# Patient Record
Sex: FEMALE | Race: WHITE | Employment: OTHER | URBAN - METROPOLITAN AREA
[De-identification: names, ages, dates, MRNs, and addresses within clinical notes are randomized per-mention and may not be internally consistent; named-entity substitution may affect disease eponyms.]

---

## 2017-11-20 ENCOUNTER — HOSPITAL ENCOUNTER (OUTPATIENT)
Dept: GENERAL RADIOLOGY | Age: 82
Discharge: HOME | End: 2017-11-20
Payer: MEDICARE

## 2017-11-20 DIAGNOSIS — M25.562 LEFT KNEE PAIN: ICD-10-CM

## 2017-11-20 PROCEDURE — 73562 X-RAY EXAM OF KNEE 3: CPT

## 2018-07-31 ENCOUNTER — APPOINTMENT (OUTPATIENT)
Dept: CT IMAGING | Age: 83
DRG: 312 | End: 2018-07-31
Attending: EMERGENCY MEDICINE
Payer: MEDICARE

## 2018-07-31 ENCOUNTER — HOSPITAL ENCOUNTER (INPATIENT)
Age: 83
LOS: 3 days | Discharge: NURSING, REHAB OR LONG-TERM CARE | DRG: 312 | End: 2018-08-04
Attending: EMERGENCY MEDICINE | Admitting: INTERNAL MEDICINE
Payer: MEDICARE

## 2018-07-31 DIAGNOSIS — I10 HYPERTENSION, ACCELERATED: ICD-10-CM

## 2018-07-31 DIAGNOSIS — R55 SYNCOPE AND COLLAPSE: Primary | ICD-10-CM

## 2018-07-31 DIAGNOSIS — S00.83XA CONTUSION OF FOREHEAD, INITIAL ENCOUNTER: ICD-10-CM

## 2018-07-31 DIAGNOSIS — E83.42 HYPOMAGNESEMIA: ICD-10-CM

## 2018-07-31 LAB
ALBUMIN SERPL-MCNC: 3.5 G/DL (ref 3.4–5)
ALBUMIN/GLOB SERPL: 1.3 {RATIO} (ref 0.8–1.7)
ALP SERPL-CCNC: 64 U/L (ref 45–117)
ALT SERPL-CCNC: 18 U/L (ref 13–56)
ANION GAP SERPL CALC-SCNC: 8 MMOL/L (ref 3–18)
APPEARANCE UR: CLEAR
AST SERPL-CCNC: 27 U/L (ref 15–37)
BACTERIA URNS QL MICRO: NEGATIVE /HPF
BASOPHILS # BLD: 0 K/UL (ref 0–0.1)
BASOPHILS NFR BLD: 0 % (ref 0–2)
BILIRUB SERPL-MCNC: 0.6 MG/DL (ref 0.2–1)
BILIRUB UR QL: NEGATIVE
BUN SERPL-MCNC: 29 MG/DL (ref 7–18)
BUN/CREAT SERPL: 30 (ref 12–20)
CALCIUM SERPL-MCNC: 9.5 MG/DL (ref 8.5–10.1)
CHLORIDE SERPL-SCNC: 103 MMOL/L (ref 100–108)
CK MB CFR SERPL CALC: 1.4 % (ref 0–4)
CK MB SERPL-MCNC: 4.3 NG/ML (ref 5–25)
CK SERPL-CCNC: 305 U/L (ref 26–192)
CO2 SERPL-SCNC: 29 MMOL/L (ref 21–32)
COLOR UR: YELLOW
CREAT SERPL-MCNC: 0.98 MG/DL (ref 0.6–1.3)
DIFFERENTIAL METHOD BLD: ABNORMAL
EOSINOPHIL # BLD: 0.1 K/UL (ref 0–0.4)
EOSINOPHIL NFR BLD: 0 % (ref 0–5)
EPITH CASTS URNS QL MICRO: NORMAL /LPF (ref 0–5)
ERYTHROCYTE [DISTWIDTH] IN BLOOD BY AUTOMATED COUNT: 13.2 % (ref 11.6–14.5)
GLOBULIN SER CALC-MCNC: 2.7 G/DL (ref 2–4)
GLUCOSE SERPL-MCNC: 95 MG/DL (ref 74–99)
GLUCOSE UR STRIP.AUTO-MCNC: NEGATIVE MG/DL
HCT VFR BLD AUTO: 33.2 % (ref 35–45)
HGB BLD-MCNC: 11.2 G/DL (ref 12–16)
HGB UR QL STRIP: NEGATIVE
KETONES UR QL STRIP.AUTO: NEGATIVE MG/DL
LEUKOCYTE ESTERASE UR QL STRIP.AUTO: ABNORMAL
LYMPHOCYTES # BLD: 2 K/UL (ref 0.9–3.6)
LYMPHOCYTES NFR BLD: 18 % (ref 21–52)
MAGNESIUM SERPL-MCNC: 1 MG/DL (ref 1.6–2.6)
MCH RBC QN AUTO: 28.7 PG (ref 24–34)
MCHC RBC AUTO-ENTMCNC: 33.7 G/DL (ref 31–37)
MCV RBC AUTO: 85.1 FL (ref 74–97)
MONOCYTES # BLD: 1 K/UL (ref 0.05–1.2)
MONOCYTES NFR BLD: 9 % (ref 3–10)
NEUTS SEG # BLD: 8.1 K/UL (ref 1.8–8)
NEUTS SEG NFR BLD: 73 % (ref 40–73)
NITRITE UR QL STRIP.AUTO: NEGATIVE
PH UR STRIP: 6.5 [PH] (ref 5–8)
PLATELET # BLD AUTO: 331 K/UL (ref 135–420)
PMV BLD AUTO: 9.6 FL (ref 9.2–11.8)
POTASSIUM SERPL-SCNC: 3.1 MMOL/L (ref 3.5–5.5)
PROT SERPL-MCNC: 6.2 G/DL (ref 6.4–8.2)
PROT UR STRIP-MCNC: NEGATIVE MG/DL
RBC # BLD AUTO: 3.9 M/UL (ref 4.2–5.3)
RBC #/AREA URNS HPF: 0 /HPF (ref 0–5)
SODIUM SERPL-SCNC: 140 MMOL/L (ref 136–145)
SP GR UR REFRACTOMETRY: 1.01 (ref 1–1.03)
TROPONIN I SERPL-MCNC: 0.05 NG/ML (ref 0–0.04)
UROBILINOGEN UR QL STRIP.AUTO: 0.2 EU/DL (ref 0.2–1)
WBC # BLD AUTO: 11.2 K/UL (ref 4.6–13.2)
WBC URNS QL MICRO: NORMAL /HPF (ref 0–4)

## 2018-07-31 PROCEDURE — 74011250636 HC RX REV CODE- 250/636: Performed by: EMERGENCY MEDICINE

## 2018-07-31 PROCEDURE — 70450 CT HEAD/BRAIN W/O DYE: CPT

## 2018-07-31 PROCEDURE — 74011250637 HC RX REV CODE- 250/637: Performed by: EMERGENCY MEDICINE

## 2018-07-31 PROCEDURE — 74011000250 HC RX REV CODE- 250: Performed by: EMERGENCY MEDICINE

## 2018-07-31 PROCEDURE — 80053 COMPREHEN METABOLIC PANEL: CPT | Performed by: EMERGENCY MEDICINE

## 2018-07-31 PROCEDURE — 99218 HC RM OBSERVATION: CPT

## 2018-07-31 PROCEDURE — 93005 ELECTROCARDIOGRAM TRACING: CPT

## 2018-07-31 PROCEDURE — 74011250637 HC RX REV CODE- 250/637: Performed by: INTERNAL MEDICINE

## 2018-07-31 PROCEDURE — 72131 CT LUMBAR SPINE W/O DYE: CPT

## 2018-07-31 PROCEDURE — 74011250636 HC RX REV CODE- 250/636: Performed by: INTERNAL MEDICINE

## 2018-07-31 PROCEDURE — 81001 URINALYSIS AUTO W/SCOPE: CPT | Performed by: EMERGENCY MEDICINE

## 2018-07-31 PROCEDURE — 99285 EMERGENCY DEPT VISIT HI MDM: CPT

## 2018-07-31 PROCEDURE — 51701 INSERT BLADDER CATHETER: CPT

## 2018-07-31 PROCEDURE — 72125 CT NECK SPINE W/O DYE: CPT

## 2018-07-31 PROCEDURE — 83735 ASSAY OF MAGNESIUM: CPT | Performed by: EMERGENCY MEDICINE

## 2018-07-31 PROCEDURE — 82550 ASSAY OF CK (CPK): CPT | Performed by: EMERGENCY MEDICINE

## 2018-07-31 PROCEDURE — 85025 COMPLETE CBC W/AUTO DIFF WBC: CPT | Performed by: EMERGENCY MEDICINE

## 2018-07-31 PROCEDURE — 77030005563 HC CATH URETH INT MMGH -A

## 2018-07-31 RX ORDER — MAGNESIUM SULFATE HEPTAHYDRATE 40 MG/ML
2 INJECTION, SOLUTION INTRAVENOUS ONCE
Status: COMPLETED | OUTPATIENT
Start: 2018-07-31 | End: 2018-07-31

## 2018-07-31 RX ORDER — CLONIDINE HYDROCHLORIDE 0.1 MG/1
0.2 TABLET ORAL
Status: COMPLETED | OUTPATIENT
Start: 2018-07-31 | End: 2018-07-31

## 2018-07-31 RX ORDER — SODIUM CHLORIDE 9 MG/ML
100 INJECTION, SOLUTION INTRAVENOUS CONTINUOUS
Status: DISCONTINUED | OUTPATIENT
Start: 2018-07-31 | End: 2018-07-31

## 2018-07-31 RX ORDER — POTASSIUM CHLORIDE 1.5 G/1.77G
40 POWDER, FOR SOLUTION ORAL
Status: COMPLETED | OUTPATIENT
Start: 2018-07-31 | End: 2018-07-31

## 2018-07-31 RX ORDER — POTASSIUM CHLORIDE, DEXTROSE MONOHYDRATE AND SODIUM CHLORIDE 300; 5; 900 MG/100ML; G/100ML; MG/100ML
INJECTION, SOLUTION INTRAVENOUS CONTINUOUS
Status: DISCONTINUED | OUTPATIENT
Start: 2018-07-31 | End: 2018-08-01

## 2018-07-31 RX ORDER — BACITRACIN 500 UNIT/G
1 PACKET (EA) TOPICAL
Status: COMPLETED | OUTPATIENT
Start: 2018-07-31 | End: 2018-07-31

## 2018-07-31 RX ORDER — ACETAMINOPHEN 500 MG
1000 TABLET ORAL
Status: COMPLETED | OUTPATIENT
Start: 2018-07-31 | End: 2018-07-31

## 2018-07-31 RX ADMIN — BACITRACIN 1 PACKET: 500 OINTMENT TOPICAL at 20:52

## 2018-07-31 RX ADMIN — SODIUM CHLORIDE 500 ML: 900 INJECTION, SOLUTION INTRAVENOUS at 20:53

## 2018-07-31 RX ADMIN — POTASSIUM CHLORIDE 40 MEQ: 1.5 POWDER, FOR SOLUTION ORAL at 21:17

## 2018-07-31 RX ADMIN — MAGNESIUM SULFATE HEPTAHYDRATE 2 G: 40 INJECTION, SOLUTION INTRAVENOUS at 23:09

## 2018-07-31 RX ADMIN — SODIUM CHLORIDE 100 ML/HR: 900 INJECTION, SOLUTION INTRAVENOUS at 20:13

## 2018-07-31 RX ADMIN — MAGNESIUM SULFATE HEPTAHYDRATE 2 G: 40 INJECTION, SOLUTION INTRAVENOUS at 21:17

## 2018-07-31 RX ADMIN — CLONIDINE HYDROCHLORIDE 0.2 MG: 0.1 TABLET ORAL at 23:00

## 2018-07-31 RX ADMIN — ACETAMINOPHEN 1000 MG: 500 TABLET, FILM COATED ORAL at 20:52

## 2018-07-31 NOTE — IP AVS SNAPSHOT
[address] [address] 
[phone] Patient: Vidya Cervantes MRN: [id number] A check edilia indicates which time of day the medication should be taken. My Medications START taking these medications Instructions Each Dose to Equal  
 Morning Noon Evening Bedtime  
 docusate sodium 100 mg capsule Commonly known as:  Car Valdovinos Your last dose was: Your next dose is: Take 1 Cap by mouth two (2) times a day for 90 days. 100 mg CONTINUE taking these medications Instructions Each Dose to Equal  
 Morning Noon Evening Bedtime  
 acetaminophen 325 mg tablet Commonly known as:  TYLENOL Your last dose was: Your next dose is: Take 2 Tabs by mouth every six (6) hours as needed. 650 mg  
    
   
   
   
  
 albuterol-ipratropium 2.5 mg-0.5 mg/3 ml Nebu Commonly known as:  Bridget Short Your last dose was: Your next dose is:    
   
   
 3 mL by Nebulization route three (3) times daily as needed for Wheezing. 3 mL  
    
   
   
   
  
 aspirin 81 mg tablet Your last dose was: Your next dose is: Take 81 mg by mouth. 81 mg  
    
   
   
   
  
 atenolol 50 mg tablet Commonly known as:  TENORMIN Your last dose was: Your next dose is: Take 1 Tab by mouth every twelve (12) hours. 50 mg  
    
   
   
   
  
 cloNIDine HCl 0.2 mg tablet Commonly known as:  CATAPRES Your last dose was: Your next dose is: Take 1 Tab by mouth every eight (8) hours. 0.2 mg  
    
   
   
   
  
 hydrALAZINE 50 mg tablet Commonly known as:  APRESOLINE Your last dose was: Your next dose is: Take 1 Tab by mouth three (3) times daily. 50 mg  
    
   
   
   
  
 losartan-hydroCHLOROthiazide 100-25 mg per tablet Commonly known as:  HYZAAR Your last dose was: Your next dose is: Take 1 Tab by mouth daily. 1 Tab  
    
   
   
   
  
 pantoprazole 40 mg tablet Commonly known as:  PROTONIX Your last dose was: Your next dose is: Take 1 Tab by mouth Daily (before breakfast). 40 mg  
    
   
   
   
  
 predniSONE 10 mg tablet Commonly known as:  Juaquin Robersoning Your last dose was: Your next dose is: Take 0.5 Tabs by mouth daily. 5 mg  
    
   
   
   
  
 simvastatin 40 mg tablet Commonly known as:  ZOCOR Your last dose was: Your next dose is: Take  by mouth nightly. VITAMIN D2 50,000 unit capsule Generic drug:  ergocalciferol Your last dose was: Your next dose is: Take 50,000 Units by mouth. 51832 Units STOP taking these medications   
 omeprazole 20 mg capsule Commonly known as:  PRILOSEC QUEtiapine 25 mg tablet Commonly known as:  SEROquel Where to Get Your Medications Information on where to get these meds will be given to you by the nurse or doctor. ! Ask your nurse or doctor about these medications  
  docusate sodium 100 mg capsule

## 2018-07-31 NOTE — ED TRIAGE NOTES
Pt brought in by EMS for c/o syncope and fall with head injury. Per EMS pt fell while tending to her garden at about noon this day, and has been lying in her garden ever since. Pt reports positive LOC and hitting her head. No c-spine or backboard precautions taken by EMS. EMS interventions include IV in left A/C and 500mL saline bolus administration.

## 2018-07-31 NOTE — IP AVS SNAPSHOT
[address] [address] 
[phone] Patient: Sam Tomas MRN: [id number] About your hospitalization You were admitted on:  2018 You last received care in the:  [address] [place] [address] You were discharged on:  2018 Why you were hospitalized Your primary diagnosis was:  Not on File Your diagnoses also included:  Syncope And Collapse, Hypomagnesemia, Syncope, Hypokalemia, Htn (Hypertension), Knee Pain, Left, Weakness Follow-up Information Follow up With Details Comments Contact Info Gianluca Alvarez MD   [address] [facility] Located within [facility] [id number] [phone] Discharge Orders None A check edilia indicates which time of day the medication should be taken. My Medications START taking these medications Instructions Each Dose to Equal  
 Morning Noon Evening Bedtime  
 docusate sodium 100 mg capsule Commonly known as:  Micki Eisenmenger Your last dose was: Your next dose is: Take 1 Cap by mouth two (2) times a day for 90 days. 100 mg CONTINUE taking these medications Instructions Each Dose to Equal  
 Morning Noon Evening Bedtime  
 acetaminophen 325 mg tablet Commonly known as:  TYLENOL Your last dose was: Your next dose is: Take 2 Tabs by mouth every six (6) hours as needed. 650 mg  
    
   
   
   
  
 albuterol-ipratropium 2.5 mg-0.5 mg/3 ml Nebu Commonly known as:  Carlton Embs Your last dose was: Your next dose is:    
   
   
 3 mL by Nebulization route three (3) times daily as needed for Wheezing. 3 mL  
    
   
   
   
  
 aspirin 81 mg tablet Your last dose was: Your next dose is: Take 81 mg by mouth. 81 mg  
    
   
   
   
  
 atenolol 50 mg tablet Commonly known as:  TENORMIN Your last dose was: Your next dose is: Take 1 Tab by mouth every twelve (12) hours. 50 mg  
    
   
   
   
  
 cloNIDine HCl 0.2 mg tablet Commonly known as:  CATAPRES Your last dose was: Your next dose is: Take 1 Tab by mouth every eight (8) hours. 0.2 mg  
    
   
   
   
  
 hydrALAZINE 50 mg tablet Commonly known as:  APRESOLINE Your last dose was: Your next dose is: Take 1 Tab by mouth three (3) times daily. 50 mg  
    
   
   
   
  
 losartan-hydroCHLOROthiazide 100-25 mg per tablet Commonly known as:  HYZAAR Your last dose was: Your next dose is: Take 1 Tab by mouth daily. 1 Tab  
    
   
   
   
  
 pantoprazole 40 mg tablet Commonly known as:  PROTONIX Your last dose was: Your next dose is: Take 1 Tab by mouth Daily (before breakfast). 40 mg  
    
   
   
   
  
 predniSONE 10 mg tablet Commonly known as:  Orlean Aas Your last dose was: Your next dose is: Take 0.5 Tabs by mouth daily. 5 mg  
    
   
   
   
  
 simvastatin 40 mg tablet Commonly known as:  ZOCOR Your last dose was: Your next dose is: Take  by mouth nightly. VITAMIN D2 50,000 unit capsule Generic drug:  ergocalciferol Your last dose was: Your next dose is: Take 50,000 Units by mouth. 31132 Units STOP taking these medications   
 omeprazole 20 mg capsule Commonly known as:  PRILOSEC QUEtiapine 25 mg tablet Commonly known as:  SEROquel Where to Get Your Medications Information on where to get these meds will be given to you by the nurse or doctor. ! Ask your nurse or doctor about these medications  
  docusate sodium 100 mg capsule Discharge Instructions Fainting: Care Instructions Your Care Instructions When you faint, or pass out, you lose consciousness for a short time. A brief drop in blood flow to the brain often causes it. When you fall or lie down, more blood flows to your brain and you regain consciousness. Emotional stress, pain, or overheating-especially if you have been standing-can make you faint. In these cases, fainting is usually not serious. But fainting can be a sign of a more serious problem. Your doctor may want you to have more tests to rule out other causes. The treatment you need depends on the reason why you fainted. The doctor has checked you carefully, but problems can develop later. If you notice any problems or new symptoms, get medical treatment right away. Follow-up care is a key part of your treatment and safety. Be sure to make and go to all appointments, and call your doctor if you are having problems. It's also a good idea to know your test results and keep a list of the medicines you take. How can you care for yourself at home? · Drink plenty of fluids to prevent dehydration. If you have kidney, heart, or liver disease and have to limit fluids, talk with your doctor before you increase your fluid intake. When should you call for help? Call 911 anytime you think you may need emergency care. For example, call if: 
  · You have symptoms of a heart problem. These may include: ¨ Chest pain or pressure. ¨ Severe trouble breathing. ¨ A fast or irregular heartbeat. ¨ Lightheadedness or sudden weakness. ¨ Coughing up pink, foamy mucus. ¨ Passing out. After you call 911, the  may tell you to chew 1 adult-strength or 2 to 4 low-dose aspirin. Wait for an ambulance. Do not try to drive yourself.  
  · You have symptoms of a stroke. These may include: 
¨ Sudden numbness, tingling, weakness, or loss of movement in your face, arm, or leg, especially on only one side of your body. ¨ Sudden vision changes. ¨ Sudden trouble speaking. ¨ Sudden confusion or trouble understanding simple statements. ¨ Sudden problems with walking or balance. ¨ A sudden, severe headache that is different from past headaches.  
  · You passed out (lost consciousness) again.  
 Watch closely for changes in your health, and be sure to contact your doctor if: 
  · You do not get better as expected. Where can you learn more? Go to http://abena-tameka.info/. Enter Z639 in the search box to learn more about \"Fainting: Care Instructions. \" Current as of: November 20, 2017 Content Version: 11.7 © 6591-7135 [employer]. Care instructions adapted under license by [employer] (which disclaims liability or warranty for this information). If you have questions about a medical condition or this instruction, always ask your healthcare professional. [address] any warranty or liability for your use of this information. High Blood Pressure: Care Instructions Your Care Instructions If your blood pressure is usually above 130/80, you have high blood pressure, or hypertension. That means the top number is 130 or higher or the bottom number is 80 or higher, or both. Despite what a lot of people think, high blood pressure usually doesn't cause headaches or make you feel dizzy or lightheaded. It usually has no symptoms. But it does increase your risk for heart attack, stroke, and kidney or eye damage. The higher your blood pressure, the more your risk increases. Your doctor will give you a goal for your blood pressure. Your goal will be based on your health and your age. Lifestyle changes, such as eating healthy and being active, are always important to help lower blood pressure. You might also take medicine to reach your blood pressure goal. 
Follow-up care is a key part of your treatment and safety.  Be sure to make and go to all appointments, and call your doctor if you are having problems. It's also a good idea to know your test results and keep a list of the medicines you take. How can you care for yourself at home? Medical treatment · If you stop taking your medicine, your blood pressure will go back up. You may take one or more types of medicine to lower your blood pressure. Be safe with medicines. Take your medicine exactly as prescribed. Call your doctor if you think you are having a problem with your medicine. · Talk to your doctor before you start taking aspirin every day. Aspirin can help certain people lower their risk of a heart attack or stroke. But taking aspirin isn't right for everyone, because it can cause serious bleeding. · See your doctor regularly. You may need to see the doctor more often at first or until your blood pressure comes down. · If you are taking blood pressure medicine, talk to your doctor before you take decongestants or anti-inflammatory medicine, such as ibuprofen. Some of these medicines can raise blood pressure. · Learn how to check your blood pressure at home. Lifestyle changes · Stay at a healthy weight. This is especially important if you put on weight around the waist. Losing even 10 pounds can help you lower your blood pressure. · If your doctor recommends it, get more exercise. Walking is a good choice. Bit by bit, increase the amount you walk every day. Try for at least 30 minutes on most days of the week. You also may want to swim, bike, or do other activities. · Avoid or limit alcohol. Talk to your doctor about whether you can drink any alcohol. · Try to limit how much sodium you eat to less than 2,300 milligrams (mg) a day. Your doctor may ask you to try to eat less than 1,500 mg a day. · Eat plenty of fruits (such as bananas and oranges), vegetables, legumes, whole grains, and low-fat dairy products. · Lower the amount of saturated fat in your diet. Saturated fat is found in animal products such as milk, cheese, and meat. Limiting these foods may help you lose weight and also lower your risk for heart disease. · Do not smoke. Smoking increases your risk for heart attack and stroke. If you need help quitting, talk to your doctor about stop-smoking programs and medicines. These can increase your chances of quitting for good. When should you call for help? Call 911 anytime you think you may need emergency care. This may mean having symptoms that suggest that your blood pressure is causing a serious heart or blood vessel problem. Your blood pressure may be over 180/110. 
 For example, call 911 if: 
  · You have symptoms of a heart attack. These may include: ¨ Chest pain or pressure, or a strange feeling in the chest. 
¨ Sweating. ¨ Shortness of breath. ¨ Nausea or vomiting. ¨ Pain, pressure, or a strange feeling in the back, neck, jaw, or upper belly or in one or both shoulders or arms. ¨ Lightheadedness or sudden weakness. ¨ A fast or irregular heartbeat.  
  · You have symptoms of a stroke. These may include: 
¨ Sudden numbness, tingling, weakness, or loss of movement in your face, arm, or leg, especially on only one side of your body. ¨ Sudden vision changes. ¨ Sudden trouble speaking. ¨ Sudden confusion or trouble understanding simple statements. ¨ Sudden problems with walking or balance. ¨ A sudden, severe headache that is different from past headaches.  
  · You have severe back or belly pain.  
 Do not wait until your blood pressure comes down on its own. Get help right away. 
 Call your doctor now or seek immediate care if: 
  · Your blood pressure is much higher than normal (such as 180/110 or higher), but you don't have symptoms.  
  · You think high blood pressure is causing symptoms, such as: ¨ Severe headache. ¨ Blurry vision.  Watch closely for changes in your health, and be sure to contact your doctor if: 
  · Your blood pressure measures 140/90 or higher at least 2 times. That means the top number is 140 or higher or the bottom number is 90 or higher, or both.  
  · You think you may be having side effects from your blood pressure medicine.  
  · Your blood pressure is usually normal, but it goes above normal at least 2 times. Where can you learn more? Go to http://abena-tameka.info/. Enter K403 in the search box to learn more about \"High Blood Pressure: Care Instructions. \" Current as of: December 6, 2017 Content Version: 11.7 © 0706-6255 [employer]. Care instructions adapted under license by [employer] (which disclaims liability or warranty for this information). If you have questions about a medical condition or this instruction, always ask your healthcare professional. [address] [address] any warranty or liability for your use of this information. DISCHARGE SUMMARY from Nurse PATIENT INSTRUCTIONS: 
 
After general anesthesia or intravenous sedation, for 24 hours or while taking prescription Narcotics: · Limit your activities · Do not drive and operate hazardous machinery · Do not make important personal or business decisions · Do  not drink alcoholic beverages · If you have not urinated within 8 hours after discharge, please contact your surgeon on call. Report the following to your surgeon: 
· Excessive pain, swelling, redness or odor of or around the surgical area · Temperature over 100.5 · Nausea and vomiting lasting longer than 4 hours or if unable to take medications · Any signs of decreased circulation or nerve impairment to extremity: change in color, persistent  numbness, tingling, coldness or increase pain · Any questions What to do at Home: 
Recommended activity: Activity as tolerated, or as physician advised If you experience any of the following symptoms of a Stroke, Warning Signs of a Heart Attack  And When to Call for Help as listed under discharge teachings , please follow up with your primary care physician or call 911. *  Please give a list of your current medications to your Primary Care Provider. *  Please update this list whenever your medications are discontinued, doses are 
    changed, or new medications (including over-the-counter products) are added. *  Please carry medication information at all times in case of emergency situations. These are general instructions for a healthy lifestyle: No smoking/ No tobacco products/ Avoid exposure to second hand smoke Surgeon General's Warning:  Quitting smoking now greatly reduces serious risk to your health. Obesity, smoking, and sedentary lifestyle greatly increases your risk for illness A healthy diet, regular physical exercise & weight monitoring are important for maintaining a healthy lifestyle You may be retaining fluid if you have a history of heart failure or if you experience any of the following symptoms:  Weight gain of 3 pounds or more overnight or 5 pounds in a week, increased swelling in our hands or feet or shortness of breath while lying flat in bed. Please call your doctor as soon as you notice any of these symptoms; do not wait until your next office visit. Recognize signs and symptoms of STROKE: 
 
F-face looks uneven A-arms unable to move or move unevenly S-speech slurred or non-existent T-time-call 911 as soon as signs and symptoms begin-DO NOT go Back to bed or wait to see if you get better-TIME IS BRAIN. Warning Signs of HEART ATTACK Call 911 if you have these symptoms: 
? Chest discomfort. Most heart attacks involve discomfort in the center of the chest that lasts more than a few minutes, or that goes away and comes back. It can feel like uncomfortable pressure, squeezing, fullness, or pain. ? Discomfort in other areas of the upper body. Symptoms can include pain or discomfort in one or both arms, the back, neck, jaw, or stomach. ? Shortness of breath with or without chest discomfort. ? Other signs may include breaking out in a cold sweat, nausea, or lightheadedness. Don't wait more than five minutes to call [address]! Fast action can save your life. Calling 911 is almost always the fastest way to get lifesaving treatment. Emergency Medical Services staff can begin treatment when they arrive  up to an hour sooner than if someone gets to the hospital by car. The discharge information has been reviewed with the patient. The patient verbalized understanding. Discharge medications reviewed with the patient and appropriate educational materials and side effects teaching were provided. ___________________________________________________________________________________________________________________________________ [employer] Announcement We are excited to announce that we are making your provider's discharge notes available to you in [employer]. You will see these notes when they are completed and signed by the physician that discharged you from your recent hospital stay. If you have any questions or concerns about any information you see in [employer], please call the Health Information Department where you were seen or reach out to your Primary Care Provider for more information about your plan of care. Introducing [facility]! Nellie Dewitt introduces [employer] patient portal. Now you can access parts of your medical record, email your doctor's office, and request medication refills online. 1. In your internet browser, go to [email or web address] 2. Click on the First Time User? Click Here link in the Sign In box. You will see the New Member Sign Up page. 3. Enter your [employer] Access Code exactly as it appears below.  You will not need to use this code after youve completed the sign-up process. If you do not sign up before the expiration date, you must request a new code. · [employer] Access Code: [email or web address] Expires: 10/29/2018  7:53 PM 
 
4. Enter the last four digits of your Social Security Number (xxxx) and Date of Birth (mm/dd/yyyy) as indicated and click Submit. You will be taken to the next sign-up page. 5. Create a [employer] ID. This will be your [employer] login ID and cannot be changed, so think of one that is secure and easy to remember. 6. Create a [employer] password. You can change your password at any time. 7. Enter your Password Reset Question and Answer. This can be used at a later time if you forget your password. 8. Enter your e-mail address. You will receive e-mail notification when new information is available in [address] 9. Click Sign Up. You can now view and download portions of your medical record. 10. Click the Download Summary menu link to download a portable copy of your medical information. If you have questions, please visit the Frequently Asked Questions section of the [employer] website. Remember, [employer] is NOT to be used for urgent needs. For medical emergencies, dial 911. Now available from your iPhone and Android! Introducing Neri Tapia As a [facility] patient, I wanted to make you aware of our electronic visit tool called Neri Reaganshelly. [facility] 24/7 allows you to connect within minutes with a medical provider 24 hours a day, seven days a week via a mobile device or tablet or logging into a secure website from your computer. You can access Neri Tapia from anywhere in the [place].  
 
A virtual visit might be right for you when you have a simple condition and feel like you just dont want to get out of bed, or cant get away from work for an appointment, when your regular [facility] provider is not available (evenings, weekends or holidays), or when youre out of town and need minor care. Electronic visits cost only $49 and if the [employer] 24/7 provider determines a prescription is needed to treat your condition, one can be electronically transmitted to a nearby pharmacy*. Please take a moment to enroll today if you have not already done so. The enrollment process is free and takes just a few minutes. To enroll, please download the [employer] 24/7 rick to your tablet or phone, or visit [email or web address] to enroll on your computer. And, as an [address] [place] [address] patient with a [employer] account, the results of your visits will be scanned into your electronic medical record and your primary care provider will be able to view the scanned results. We urge you to continue to see your regular [employer] provider for your ongoing medical care. And while your primary care provider may not be the one available when you seek a [employer] virtual visit, the peace of mind you get from getting a real diagnosis real time can be priceless. For more information on [employer], view our Frequently Asked Questions (FAQs) at [email or web address]. Sincerely, 
 
Mike Barraza MD 
Chief Medical Officer [address] [place] [address] *:  certain medications cannot be prescribed via [employer] Providers Seen During Your Hospitalization Provider Specialty Primary office phone Minor Luna MD Emergency Medicine [phone] Yash Hanson MD Geriatric Medicine [phone] Your Primary Care Physician (PCP) Primary Care Physician Office Phone Office Fax Mabel Blanco [phone] You are allergic to the following No active allergies Recent Documentation Height Weight Breastfeeding? BMI OB Status Smoking Status 1.651 m 57.2 kg No 20.97 kg/m2 Postmenopausal Former Smoker Emergency Contacts Name Discharge Info Relation Home Work Mobile YoungmbAdama N/A  AT THIS TIME [6] Son [22] [phone] Patient Belongings The following personal items are in your possession at time of discharge: 
  Dental Appliances: None  Visual Aid: None      Home Medications: None   Jewelry: Watch, With patient  Clothing: Shirt, Undergarments, Pants, Socks, With patient    Other Valuables: Arnaud Machado, With patient, Money (comment) (pt declines assessment of personal belongings and safe) Please provide this summary of care documentation to your next provider. Signatures-by signing, you are acknowledging that this After Visit Summary has been reviewed with you and you have received a copy. Patient Signature:  ____________________________________________________________ Date:  ____________________________________________________________  
  
[facility] Provider Signature:  ____________________________________________________________ Date:  ____________________________________________________________

## 2018-08-01 ENCOUNTER — APPOINTMENT (OUTPATIENT)
Dept: GENERAL RADIOLOGY | Age: 83
DRG: 312 | End: 2018-08-01
Attending: INTERNAL MEDICINE
Payer: MEDICARE

## 2018-08-01 PROBLEM — R55 SYNCOPE AND COLLAPSE: Status: RESOLVED | Noted: 2018-07-31 | Resolved: 2018-08-01

## 2018-08-01 PROBLEM — R53.1 WEAKNESS: Status: ACTIVE | Noted: 2018-08-01

## 2018-08-01 PROBLEM — R55 SYNCOPE: Status: ACTIVE | Noted: 2018-08-01

## 2018-08-01 PROBLEM — E87.6 HYPOKALEMIA: Status: ACTIVE | Noted: 2018-08-01

## 2018-08-01 PROBLEM — M25.562 KNEE PAIN, LEFT: Status: ACTIVE | Noted: 2018-08-01

## 2018-08-01 PROBLEM — I10 HTN (HYPERTENSION): Status: ACTIVE | Noted: 2018-08-01

## 2018-08-01 LAB
ANION GAP SERPL CALC-SCNC: 7 MMOL/L (ref 3–18)
ATRIAL RATE: 78 BPM
BUN SERPL-MCNC: 24 MG/DL (ref 7–18)
BUN/CREAT SERPL: 32 (ref 12–20)
CALCIUM SERPL-MCNC: 8.8 MG/DL (ref 8.5–10.1)
CALCULATED P AXIS, ECG09: 65 DEGREES
CALCULATED R AXIS, ECG10: 30 DEGREES
CALCULATED T AXIS, ECG11: 60 DEGREES
CHLORIDE SERPL-SCNC: 107 MMOL/L (ref 100–108)
CO2 SERPL-SCNC: 28 MMOL/L (ref 21–32)
CREAT SERPL-MCNC: 0.75 MG/DL (ref 0.6–1.3)
CRP SERPL-MCNC: 4 MG/DL (ref 0–0.3)
DIAGNOSIS, 93000: NORMAL
ERYTHROCYTE [SEDIMENTATION RATE] IN BLOOD: 40 MM/HR (ref 0–30)
FOLATE SERPL-MCNC: 17.6 NG/ML (ref 3.1–17.5)
GLUCOSE SERPL-MCNC: 94 MG/DL (ref 74–99)
MAGNESIUM SERPL-MCNC: 2.1 MG/DL (ref 1.6–2.6)
P-R INTERVAL, ECG05: 222 MS
POTASSIUM SERPL-SCNC: 4.1 MMOL/L (ref 3.5–5.5)
Q-T INTERVAL, ECG07: 376 MS
QRS DURATION, ECG06: 84 MS
QTC CALCULATION (BEZET), ECG08: 428 MS
SODIUM SERPL-SCNC: 142 MMOL/L (ref 136–145)
TSH SERPL DL<=0.05 MIU/L-ACNC: 1.8 UIU/ML (ref 0.36–3.74)
VENTRICULAR RATE, ECG03: 78 BPM
VIT B12 SERPL-MCNC: 349 PG/ML (ref 211–911)

## 2018-08-01 PROCEDURE — 85652 RBC SED RATE AUTOMATED: CPT | Performed by: INTERNAL MEDICINE

## 2018-08-01 PROCEDURE — 73564 X-RAY EXAM KNEE 4 OR MORE: CPT

## 2018-08-01 PROCEDURE — 82607 VITAMIN B-12: CPT | Performed by: INTERNAL MEDICINE

## 2018-08-01 PROCEDURE — 97162 PT EVAL MOD COMPLEX 30 MIN: CPT

## 2018-08-01 PROCEDURE — 99218 HC RM OBSERVATION: CPT

## 2018-08-01 PROCEDURE — 74011250636 HC RX REV CODE- 250/636: Performed by: INTERNAL MEDICINE

## 2018-08-01 PROCEDURE — 77030037878 HC DRSG MEPILEX >48IN BORD MOLN -B

## 2018-08-01 PROCEDURE — 77030038269 HC DRN EXT URIN PURWCK BARD -A

## 2018-08-01 PROCEDURE — 86140 C-REACTIVE PROTEIN: CPT | Performed by: INTERNAL MEDICINE

## 2018-08-01 PROCEDURE — 77030011256 HC DRSG MEPILEX <16IN NO BORD MOLN -A

## 2018-08-01 PROCEDURE — 36415 COLL VENOUS BLD VENIPUNCTURE: CPT | Performed by: INTERNAL MEDICINE

## 2018-08-01 PROCEDURE — 71045 X-RAY EXAM CHEST 1 VIEW: CPT

## 2018-08-01 PROCEDURE — 80048 BASIC METABOLIC PNL TOTAL CA: CPT | Performed by: INTERNAL MEDICINE

## 2018-08-01 PROCEDURE — 74011250637 HC RX REV CODE- 250/637: Performed by: INTERNAL MEDICINE

## 2018-08-01 PROCEDURE — 65660000000 HC RM CCU STEPDOWN

## 2018-08-01 PROCEDURE — 83735 ASSAY OF MAGNESIUM: CPT | Performed by: INTERNAL MEDICINE

## 2018-08-01 PROCEDURE — 97530 THERAPEUTIC ACTIVITIES: CPT

## 2018-08-01 PROCEDURE — 84443 ASSAY THYROID STIM HORMONE: CPT | Performed by: INTERNAL MEDICINE

## 2018-08-01 RX ORDER — SIMVASTATIN 40 MG/1
40 TABLET, FILM COATED ORAL
Status: DISCONTINUED | OUTPATIENT
Start: 2018-08-01 | End: 2018-08-04 | Stop reason: HOSPADM

## 2018-08-01 RX ORDER — PREDNISONE 5 MG/1
5 TABLET ORAL DAILY
Status: DISCONTINUED | OUTPATIENT
Start: 2018-08-01 | End: 2018-08-01

## 2018-08-01 RX ORDER — IPRATROPIUM BROMIDE AND ALBUTEROL SULFATE 2.5; .5 MG/3ML; MG/3ML
3 SOLUTION RESPIRATORY (INHALATION)
Status: DISCONTINUED | OUTPATIENT
Start: 2018-08-01 | End: 2018-08-04 | Stop reason: HOSPADM

## 2018-08-01 RX ORDER — PANTOPRAZOLE SODIUM 40 MG/1
40 TABLET, DELAYED RELEASE ORAL
Status: DISCONTINUED | OUTPATIENT
Start: 2018-08-01 | End: 2018-08-04 | Stop reason: HOSPADM

## 2018-08-01 RX ORDER — ACETAMINOPHEN 325 MG/1
650 TABLET ORAL
Status: DISCONTINUED | OUTPATIENT
Start: 2018-08-01 | End: 2018-08-04 | Stop reason: HOSPADM

## 2018-08-01 RX ORDER — ATENOLOL 50 MG/1
50 TABLET ORAL EVERY 12 HOURS
Status: DISCONTINUED | OUTPATIENT
Start: 2018-08-01 | End: 2018-08-04 | Stop reason: HOSPADM

## 2018-08-01 RX ORDER — LOSARTAN POTASSIUM 50 MG/1
100 TABLET ORAL DAILY
Status: DISCONTINUED | OUTPATIENT
Start: 2018-08-01 | End: 2018-08-04 | Stop reason: HOSPADM

## 2018-08-01 RX ORDER — ERGOCALCIFEROL 1.25 MG/1
50000 CAPSULE ORAL
Status: DISCONTINUED | OUTPATIENT
Start: 2018-08-01 | End: 2018-08-04 | Stop reason: HOSPADM

## 2018-08-01 RX ORDER — HYDRALAZINE HYDROCHLORIDE 50 MG/1
50 TABLET, FILM COATED ORAL 3 TIMES DAILY
Status: DISCONTINUED | OUTPATIENT
Start: 2018-08-01 | End: 2018-08-04 | Stop reason: HOSPADM

## 2018-08-01 RX ORDER — HYDROCHLOROTHIAZIDE 25 MG/1
25 TABLET ORAL DAILY
Status: DISCONTINUED | OUTPATIENT
Start: 2018-08-01 | End: 2018-08-04 | Stop reason: HOSPADM

## 2018-08-01 RX ORDER — ASPIRIN 81 MG/1
81 TABLET ORAL DAILY
Status: DISCONTINUED | OUTPATIENT
Start: 2018-08-01 | End: 2018-08-04 | Stop reason: HOSPADM

## 2018-08-01 RX ORDER — CLONIDINE HYDROCHLORIDE 0.1 MG/1
0.2 TABLET ORAL EVERY 8 HOURS
Status: DISCONTINUED | OUTPATIENT
Start: 2018-08-01 | End: 2018-08-04 | Stop reason: HOSPADM

## 2018-08-01 RX ADMIN — CLONIDINE HYDROCHLORIDE 0.2 MG: 0.1 TABLET ORAL at 09:45

## 2018-08-01 RX ADMIN — PANTOPRAZOLE SODIUM 40 MG: 40 TABLET, DELAYED RELEASE ORAL at 09:46

## 2018-08-01 RX ADMIN — SIMVASTATIN 40 MG: 40 TABLET, FILM COATED ORAL at 21:35

## 2018-08-01 RX ADMIN — HYDRALAZINE HYDROCHLORIDE 50 MG: 50 TABLET, FILM COATED ORAL at 21:35

## 2018-08-01 RX ADMIN — METHYLPREDNISOLONE SODIUM SUCCINATE 40 MG: 40 INJECTION, POWDER, FOR SOLUTION INTRAMUSCULAR; INTRAVENOUS at 13:52

## 2018-08-01 RX ADMIN — ASPIRIN 81 MG: 81 TABLET, COATED ORAL at 09:46

## 2018-08-01 RX ADMIN — METHYLPREDNISOLONE SODIUM SUCCINATE 40 MG: 40 INJECTION, POWDER, FOR SOLUTION INTRAMUSCULAR; INTRAVENOUS at 21:36

## 2018-08-01 RX ADMIN — DEXTROSE MONOHYDRATE, SODIUM CHLORIDE, AND POTASSIUM CHLORIDE 1000 ML: 50; 9; 2.98 INJECTION, SOLUTION INTRAVENOUS at 00:27

## 2018-08-01 RX ADMIN — HYDROCHLOROTHIAZIDE 25 MG: 25 TABLET ORAL at 09:46

## 2018-08-01 RX ADMIN — ATENOLOL 50 MG: 50 TABLET ORAL at 21:35

## 2018-08-01 RX ADMIN — HYDRALAZINE HYDROCHLORIDE 50 MG: 50 TABLET, FILM COATED ORAL at 17:14

## 2018-08-01 RX ADMIN — ATENOLOL 50 MG: 50 TABLET ORAL at 09:45

## 2018-08-01 RX ADMIN — ERGOCALCIFEROL 50000 UNITS: 1.25 CAPSULE ORAL at 09:45

## 2018-08-01 RX ADMIN — HYDRALAZINE HYDROCHLORIDE 50 MG: 50 TABLET, FILM COATED ORAL at 09:46

## 2018-08-01 RX ADMIN — LOSARTAN POTASSIUM 100 MG: 50 TABLET ORAL at 09:46

## 2018-08-01 RX ADMIN — METHYLPREDNISOLONE SODIUM SUCCINATE 40 MG: 40 INJECTION, POWDER, FOR SOLUTION INTRAMUSCULAR; INTRAVENOUS at 09:45

## 2018-08-01 RX ADMIN — CLONIDINE HYDROCHLORIDE 0.2 MG: 0.1 TABLET ORAL at 17:14

## 2018-08-01 NOTE — PROGRESS NOTES
Problem: Pressure Injury - Risk of 
Goal: *Prevention of pressure injury Document Josiah Scale and appropriate interventions in the flowsheet. Outcome: Progressing Towards Goal 
Pressure Injury Interventions: 
  
 
Moisture Interventions: Internal/External urinary devices Activity Interventions: Pressure redistribution bed/mattress(bed type) Mobility Interventions: HOB 30 degrees or less Nutrition Interventions: Document food/fluid/supplement intake Problem: Falls - Risk of 
Goal: *Absence of Falls Document Kieran Low Fall Risk and appropriate interventions in the flowsheet. Outcome: Progressing Towards Goal 
Fall Risk Interventions: 
Mobility Interventions: Patient to call before getting OOB Medication Interventions: Patient to call before getting OOB, Teach patient to arise slowly, Bed/chair exit alarm Elimination Interventions: Call light in reach History of Falls Interventions: Investigate reason for fall

## 2018-08-01 NOTE — ED PROVIDER NOTES
HPI Comments: Dwayne Orozco is a 80 y.o. Female who states she slip and fell in yard this morning, passing out twice. Was found approx 7 hours later by caregiver in yard, struggling to get up. No nvd, fcs. No sig pain now except for low back where she was lying down and neck, head. No nvd, cp, cough, abd pain. Was unclear to why she fell. The history is provided by the patient, the EMS personnel and medical records. Past Medical History:  
Diagnosis Date  Arthritis  CAD (coronary artery disease) 2001  
 3 coronary stents  Cardiac complication  Hypertension  Migraine 3/1/2015  Polymyalgia rheumatica ([facility]) 2/28/2015 Past Surgical History:  
Procedure Laterality Date  CARDIAC SURG PROCEDURE UNLIST    
 3 stents Family History:  
Problem Relation Age of Onset  Heart Disease Mother  Heart Disease Father Social History Social History  Marital status:  Spouse name: N/A  
 Number of children: N/A  
 Years of education: N/A Occupational History  Not on file. Social History Main Topics  Smoking status: Former Smoker Quit date: 1/15/1980  Smokeless tobacco: Not on file  Alcohol use No  
 Drug use: Not on file  Sexual activity: Not on file Other Topics Concern  Not on file Social History Narrative ALLERGIES: Review of patient's allergies indicates no known allergies. Review of Systems Constitutional: Negative for fever. HENT: Negative for sore throat. Eyes: Negative for visual disturbance. Respiratory: Negative for cough and shortness of breath. Cardiovascular: Negative for chest pain. Gastrointestinal: Negative for abdominal pain. Endocrine: Negative for polyuria. Genitourinary: Negative for difficulty urinating. Musculoskeletal: Positive for gait problem. Skin: Positive for wound (left elbow, bruise to forehead, lower back).   
 
 
Vitals:  
 07/31/18 1955 07/31/18 2210  
BP: 179/77 (!) 250/91 Pulse: 87 73 Resp: 16 18 Temp: 98.4 °F (36.9 °C) SpO2: 100% 99% Weight: 54.4 kg (120 lb) Height: 5' 5\" (1.651 m) Physical Exam  
Constitutional: She is oriented to person, place, and time. She appears well-developed and well-nourished. No distress. HENT:  
Head: Head is with contusion. Head is without raccoon's eyes, without Mcginnis's sign, without abrasion and without laceration. Right Ear: External ear normal. No mastoid tenderness. No hemotympanum. Left Ear: External ear normal. No mastoid tenderness. No hemotympanum. Nose: Nose normal.  
Mouth/Throat: Uvula is midline, oropharynx is clear and moist and mucous membranes are normal.  
Eyes: Conjunctivae and EOM are normal. Pupils are equal, round, and reactive to light. No scleral icterus. Neck: Neck supple. Spinous process tenderness and muscular tenderness present. No rigidity. No edema, no erythema and normal range of motion present. Cardiovascular: Normal rate, regular rhythm, normal heart sounds and intact distal pulses. Pulmonary/Chest: Effort normal and breath sounds normal.  
Abdominal: Soft. There is no tenderness. Musculoskeletal: She exhibits no edema. There is an abrasion, contusion to lumbar spine with min ttp. No sig swelling Left elbow with nl rom. No pain on exam. Abrasion No pelvic ttp or sig lower joint ttp Neurological: She is alert and oriented to person, place, and time. No cranial nerve deficit (3-12). Gait normal.  
Skin: Skin is warm and dry. She is not diaphoretic. Psychiatric: Her behavior is normal.  
Nursing note and vitals reviewed. [facility] 
 
 
ED Course Procedures Vitals: 
Patient Vitals for the past 12 hrs: 
 Temp Pulse Resp BP SpO2  
07/31/18 2210 - 73 18 (!) 250/91 99 % 07/31/18 1955 98.4 °F (36.9 °C) 87 16 179/77 100 % Medications ordered:  
Medications  
0.9% sodium chloride infusion (100 mL/hr IntraVENous New Bag 7/31/18 2013)  
magnesium sulfate 2 g/50 ml IVPB (premix or compounded) (not administered)  
cloNIDine HCl (CATAPRES) tablet 0.2 mg (not administered)  
sodium chloride 0.9 % bolus infusion 500 mL (0 mL IntraVENous IV Completed 7/31/18 2123)  
bacitracin 500 unit/gram packet 1 Packet (1 Packet Topical Given 7/31/18 2052)  
acetaminophen (TYLENOL) tablet 1,000 mg (1,000 mg Oral Given 7/31/18 2052)  
magnesium sulfate 2 g/50 ml IVPB (premix or compounded) (0 g IntraVENous IV Completed 7/31/18 2157) potassium chloride (KLOR-CON) packet 40 mEq (40 mEq Oral Given 7/31/18 2117) Lab findings: 
Recent Results (from the past 12 hour(s)) EKG, 12 LEAD, INITIAL Collection Time: 07/31/18  8:02 PM  
Result Value Ref Range Ventricular Rate 78 BPM  
 Atrial Rate 78 BPM  
 P-R Interval 222 ms QRS Duration 84 ms Q-T Interval 376 ms QTC Calculation (Bezet) 428 ms Calculated P Axis 65 degrees Calculated R Axis 30 degrees Calculated T Axis 60 degrees Diagnosis Sinus rhythm with 1st degree AV block Minimal voltage criteria for LVH, may be normal variant Borderline ECG When compared with ECG of 10-JUL-2015 16:06, No significant change was found CBC WITH AUTOMATED DIFF Collection Time: 07/31/18  8:12 PM  
Result Value Ref Range WBC 11.2 4.6 - 13.2 K/uL  
 RBC 3.90 (L) 4.20 - 5.30 M/uL  
 HGB 11.2 (L) 12.0 - 16.0 g/dL HCT 33.2 (L) 35.0 - 45.0 % MCV 85.1 74.0 - 97.0 FL  
 MCH 28.7 24.0 - 34.0 PG  
 MCHC 33.7 31.0 - 37.0 g/dL  
 RDW 13.2 11.6 - 14.5 % PLATELET 053 120 - 688 K/uL MPV 9.6 9.2 - 11.8 FL  
 NEUTROPHILS 73 40 - 73 % LYMPHOCYTES 18 (L) 21 - 52 % MONOCYTES 9 3 - 10 % EOSINOPHILS 0 0 - 5 % BASOPHILS 0 0 - 2 %  
 ABS. NEUTROPHILS 8.1 (H) 1.8 - 8.0 K/UL  
 ABS. LYMPHOCYTES 2.0 0.9 - 3.6 K/UL  
 ABS. MONOCYTES 1.0 0.05 - 1.2 K/UL  
 ABS. EOSINOPHILS 0.1 0.0 - 0.4 K/UL  
 ABS. BASOPHILS 0.0 0.0 - 0.1 K/UL  
 DF AUTOMATED METABOLIC PANEL, COMPREHENSIVE  
 Collection Time: 07/31/18  8:12 PM  
Result Value Ref Range Sodium 140 136 - 145 mmol/L Potassium 3.1 (L) 3.5 - 5.5 mmol/L Chloride 103 100 - 108 mmol/L  
 CO2 29 21 - 32 mmol/L Anion gap 8 3.0 - 18 mmol/L Glucose 95 74 - 99 mg/dL BUN 29 (H) 7.0 - 18 MG/DL Creatinine 0.98 0.6 - 1.3 MG/DL  
 BUN/Creatinine ratio 30 (H) 12 - 20 GFR est AA >60 >60 ml/min/1.73m2 GFR est non-AA 53 (L) >60 ml/min/1.73m2 Calcium 9.5 8.5 - 10.1 MG/DL Bilirubin, total 0.6 0.2 - 1.0 MG/DL  
 ALT (SGPT) 18 13 - 56 U/L  
 AST (SGOT) 27 15 - 37 U/L Alk. phosphatase 64 45 - 117 U/L Protein, total 6.2 (L) 6.4 - 8.2 g/dL Albumin 3.5 3.4 - 5.0 g/dL Globulin 2.7 2.0 - 4.0 g/dL A-G Ratio 1.3 0.8 - 1.7 MAGNESIUM Collection Time: 07/31/18  8:12 PM  
Result Value Ref Range Magnesium 1.0 (L) 1.6 - 2.6 mg/dL CARDIAC PANEL,(CK, CKMB & TROPONIN) Collection Time: 07/31/18  8:12 PM  
Result Value Ref Range  (H) 26 - 192 U/L  
 CK - MB 4.3 (H) <3.6 ng/ml CK-MB Index 1.4 0.0 - 4.0 % Troponin-I, Qt. 0.05 (H) 0.0 - 0.045 NG/ML  
URINALYSIS W/ RFLX MICROSCOPIC Collection Time: 07/31/18  8:34 PM  
Result Value Ref Range Color YELLOW Appearance CLEAR Specific gravity 1.011 1.005 - 1.030    
 pH (UA) 6.5 5.0 - 8.0 Protein NEGATIVE  NEG mg/dL Glucose NEGATIVE  NEG mg/dL Ketone NEGATIVE  NEG mg/dL Bilirubin NEGATIVE  NEG Blood NEGATIVE  NEG Urobilinogen 0.2 0.2 - 1.0 EU/dL Nitrites NEGATIVE  NEG Leukocyte Esterase TRACE (A) NEG URINE MICROSCOPIC ONLY Collection Time: 07/31/18  8:34 PM  
Result Value Ref Range WBC 0 to 3 0 - 4 /hpf  
 RBC 0 0 - 5 /hpf Epithelial cells FEW 0 - 5 /lpf Bacteria NEGATIVE  NEG /hpf EKG interpretation by ED Physician: 
Sinus with 1st avb. No acute st tw changes Rate 78, pr 222, qtc 428 No sig change from previous Cardiac monitor: nl rate, reg rhythm. No ectopy Pulse ox: 100% ra X-Ray, CT or other radiology findings or impressions: 
CT HEAD WO CONT    (Results Pending) CT SPINE LUMB WO CONT    (Results Pending) CT SPINE CERV WO CONT    (Results Pending) Nothing acute on prelim reading Progress notes, Consult notes or additional Procedure notes: Will need obs overnight given syncope, labs, which pt is agreeable to D/w dr Lewis Mitchell who will admit ED Critical Care Note System at risk for life threatening failure: cardiac, renal, pulm Associated problems: hypertension, hypomag, hypokalemia Critical Care services provided: bedside management, consult, documentation Excluded procedures (time not included in critical care): ecg itnerp Total Critical Care Time (in minutes) 33 Reevaluation of patient:  
stable Disposition: 
Diagnosis: 1. Syncope and collapse 2. Hypomagnesemia 3. Contusion of forehead, initial encounter 4. Hypertension, accelerated Disposition: admit Follow-up Information None Patient's Medications Start Taking No medications on file Continue Taking ACETAMINOPHEN (TYLENOL) 325 MG TABLET    Take 2 Tabs by mouth every six (6) hours as needed. ALBUTEROL-IPRATROPIUM (DUO-NEB) 2.5 MG-0.5 MG/3 ML NEBULIZER SOLUTION    3 mL by Nebulization route three (3) times daily as needed for Wheezing. ASPIRIN 81 MG TABLET    Take 81 mg by mouth. ATENOLOL (TENORMIN) 50 MG TABLET    Take 1 Tab by mouth every twelve (12) hours. CLONIDINE HCL (CATAPRES) 0.2 MG TABLET    Take 1 Tab by mouth every eight (8) hours. ERGOCALCIFEROL (VITAMIN D2) 50,000 UNIT CAPSULE    Take 50,000 Units by mouth. HYDRALAZINE (APRESOLINE) 50 MG TABLET    Take 1 Tab by mouth three (3) times daily. LOSARTAN-HYDROCHLOROTHIAZIDE (HYZAAR) 100-25 MG PER TABLET    Take 1 Tab by mouth daily. OMEPRAZOLE (PRILOSEC) 20 MG CAPSULE    Take 20 mg by mouth daily.     
 PANTOPRAZOLE (PROTONIX) 40 MG TABLET    Take 1 Tab by mouth Daily (before breakfast). PREDNISONE (DELTASONE) 10 MG TABLET    Take 0.5 Tabs by mouth daily. QUETIAPINE (SEROQUEL) 25 MG TABLET    Take 0.5 Tabs by mouth two (2) times a day. SIMVASTATIN (ZOCOR) 40 MG TABLET    Take  by mouth nightly. These Medications have changed No medications on file Stop Taking No medications on file

## 2018-08-01 NOTE — ED NOTES
TRANSFER - ED to INPATIENT REPORT: 
 
SBAR report made available to receiving floor on this patient being transferred to [address] [place] [address] ([facility])  for routine progression of care Admitting diagnosis Syncope and collapse Hypomagnesemia Information from the following report(s) SBAR was made available to receiving floor. Lines:  
Peripheral IV 07/31/18 Left Antecubital (Active) Site Assessment Clean, dry, & intact 7/31/2018  8:02 PM  
Phlebitis Assessment 0 7/31/2018  8:02 PM  
Infiltration Assessment 0 7/31/2018  8:02 PM  
Dressing Status Clean, dry, & intact 7/31/2018  8:02 PM  
Dressing Type Tape;Transparent 7/31/2018  8:02 PM  
Hub Color/Line Status Pink;Flushed;Patent 7/31/2018  8:02 PM  
  
 
Medication list confirmed with patient Opportunity for questions and clarification was provided. Patient is oriented to time, place, person and situation Patient is  continent and ambulatory with assist 
  
Valuables transported with patient Patient transported with: 
 Registered Nurse

## 2018-08-01 NOTE — PROGRESS NOTES
Problem: Pressure Injury - Risk of 
Goal: *Prevention of pressure injury Document Josiah Scale and appropriate interventions in the flowsheet. Outcome: Progressing Towards Goal 
Pressure Injury Interventions: Activity Interventions: Pressure redistribution bed/mattress(bed type) Mobility Interventions: Pressure redistribution bed/mattress (bed type) Nutrition Interventions: Document food/fluid/supplement intake

## 2018-08-01 NOTE — ROUTINE PROCESS
26- Assumed care. Pt in bed sleeping. NAD. 0830- Shift assessment completed. No c/o pain. No respiratory distress noted. Alert and oriented x 4. Call light in reach. 1500- Pt taken for XR. Bedside and Verbal shift change report given to Shantal Brantley (oncoming nurse) by Jocelynn Monroe RN 
 (offgoing nurse). Report included the following information SBAR, Kardex, Procedure Summary, Intake/Output, MAR, Recent Results and Med Rec Status.

## 2018-08-01 NOTE — ROUTINE PROCESS
0000  Received alert and orientedx4 female in 3001. Pt reportedly found down in yard after several hours. VSS. Dual skin assessment completed with Teto RIVER and tele applied  - 1st degree heart block, HR 60s 
 
0400  Reassessment complete. No change in pt condition 0630  Reassessment complete. Pt with some audible wheezing 
 
0730  Bedside and Verbal shift change report given to Preston Mendenhall (oncoming nurse) by Silvano Scherer (offgoing nurse). Report included the following information SBAR, Kardex, Intake/Output, MAR and Cardiac Rhythm 1st degree heart block.

## 2018-08-01 NOTE — PROGRESS NOTES
Problem: Mobility Impaired (Adult and Pediatric) Goal: *Acute Goals and Plan of Care (Insert Text) Physical Therapy Goals Initiated 8/1/2018 and to be accomplished within 7 days. 1.  Patient will complete all bed mobility with modified independence in order to prepare for EOB/OOB activity. 2.  Patient will perform sit <> stand with modified independence in order to prepare for OOB/gait activity. 3.  Patient will perform bed to chair transfers with modified independence in order to promote mobility and encourage seated activity to progress towards their prior level of function. 4.  Patient will ambulate 150 feet with modified independence using LRAD in order to prepare for safe negotiation of their environment. 5.  Patient will ascend/descend 5 steps with S handrail use with modified independence in order to prepare for safe exit/entry into their home environment. Outcome: Progressing Towards Goal 
PHYSICAL THERAPY: Initial Assessment INPATIENT: Medicare: Hospital Day: 2 Patient: Sam Tomas (89 y.o. female)    Date: 8/1/2018 Primary Diagnosis: Syncope and collapse Hypomagnesemia Syncope  
 ,    
Precautions: Fall (Syncope) PLOF: I with ADLs and ambulation with cane ASSESSMENT : 
Patient supine in bed, agreeable to participation with PT. Reports that she lives alone in a 1 story home with 5 WILLIE at back door; she has care staff that come to her home. MIN A x 2 for supine <> sit. Edema noted on medial aspect of L knee, tender to palpation. Fair strength in R knee, strength testing of L knee deferred d/t pain. Supervision for sit <> stand and ambulation x 5 feet. Patient has c/o dizziness. Patient returned to bed and positioned for comfort with all needs within reach. Educated on purpose of PT, PT POC, strategy for bed mobility ad gait using RW. Verbalizes understanding. C/o L knee pain, unable to obtain rating.  Recommend d/c to rehab for short stay to maximize safety with ambulation and address strength deficits in LEs. Patient presents with deficits in: 
Bed Mobility, Transfers, Gait, Strength and Stairs Patient will benefit from skilled intervention to address the above impairments. Patients rehabilitation potential is considered to be Fair Factors which may influence rehabilitation potential include:  
[]         None noted 
[]         Mental ability/status [x]         Medical condition 
[]         Home/family situation and support systems 
[]         Safety awareness 
[]         Pain tolerance/management 
[]         Other: PLAN : 
Recommendations and Planned Interventions: 
[x]           Bed Mobility Training             [x]    Neuromuscular Re-Education 
[x]           Transfer Training                   []    Orthotic/Prosthetic Training 
[x]           Gait Training                          []    Modalities [x]           Therapeutic Exercises          []    Edema Management/Control 
[x]           Therapeutic Activities            [x]    Patient and Family Training/Education 
[]           Other (comment): EDUCATION:  
Education:  Patient was educated on the following topics: Educated on purpose of PT, PT POC, strategy for bed mobility ad gait using RW Barriers to Learning/Limitations: yes;  sensory deficits-vision/hearing/speech Compensate with: visual, verbal, tactile, kinesthetic cues/model Recommendations for the next treatment session: Gait training Frequency/Duration: Patient will be followed by physical therapy 3 - 5 times a week to address goals. Discharge Recommendations: Rehab for short stay Further Equipment Recommendations for Discharge: rolling walker Factors which may impact discharge planning: Medical condition SUBJECTIVE:  
Patient stated I laid on my back for 6 hours.  OBJECTIVE DATA SUMMARY:  
 
Past Medical History:  
Diagnosis Date  Arthritis  CAD (coronary artery disease) 2001  
 3 coronary stents  Cardiac complication  Hypertension  Migraine 3/1/2015  Polymyalgia rheumatica ([facility] [address] 2/28/2015 Past Surgical History:  
Procedure Laterality Date  CARDIAC SURG PROCEDURE UNLIST    
 3 stents Eval Complexity: History: MEDIUM  Complexity : 1-2 comorbidities / personal factors will impact the outcome/ POC Exam:MEDIUM Complexity : 3 Standardized tests and measures addressing body structure, function, activity limitation and / or participation in recreation  Presentation: MEDIUM Complexity : Evolving with changing characteristics  Clinical Decision Making:Medium Complexity [facility] Standing Balance Scale 2/5 Overall Complexity:MEDIUM 
 
G CODES:Mobility [id number] Current  CL= 60-79%   Goal  CK= 40-59%. The severity rating is based on the [facility] Balance Scale 2/5 [facility] Standing Balance Scale 2/5 
0: Pt performs 25% or less of standing activity (Max assist) CN, 100% impaired. 1: Pt supports self with upper extremities but requires therapist assistance. Pt performs 25-50% of effort (Mod assist) CM, 80% to <100% impaired. 1+: Pt supports self with upper extremities but requires therapist assistance. Pt performs >50% effort. (Min assist). CL, 60% to <80% impaired. 2: Pt supports self independently with both upper extremities (walker, crutches, parallel bars). CL, 60% to <80% impaired. 2+: Pt support self independently with 1 upper extremity (cane, crutch, 1 parallel bar). CK, 40% to <60% impaired. 3: Pt stands without upper extremity support for up to 30 seconds. CK, 40% to <60% impaired. 3+: Pt stands without upper extremity support for 30 seconds or greater. CJ, 20% to <40% impaired. 4: Pt independently moves and returns center of gravity 1-2 inches in one plane. CJ, 20% to <40% impaired. 4+: Pt independently moves and returns center of gravity 1-2 inches in multiple planes. CI, 1% to <20% impaired.  
5: Pt independently moves and returns center of gravity in all planes greater than 2 inches. CH, 0% impaired. Prior Level of Function/Home Situation:  
Home Situation Home Environment: Private residence # Steps to Enter: 5 One/Two Story Residence: One story Living Alone: Yes Support Systems: Home care staff Patient Expects to be Discharged to[de-identified] Private residence Current DME Used/Available at Home: Cane, straight, Walker, rolling Critical Behavior: 
Neurologic State: Alert Orientation Level: Oriented X4 Cognition: Follows commands Safety/Judgement: Fall prevention; Awareness of environment Psychosocial 
Patient Behaviors: Calm; Cooperative Purposeful Interaction: Yes 
 
Manual Muscle Testing (LE) 
       R     L Hip Flexion:   4-/5  NT/5 Knee EXT:   3+/5  NT/5 Knee FLEX:   3+/5  NT/5 Ankle DF:   4-/5  4-/5 
_________________________________________________ Tone : normal  
Sensation: NT 
[facility] [address] [facility] Functional Mobility: 
 
 
Functional Status Indep (I) Mod I Super-vision Min A Mod A Max A Total A Assist x2 Verbal cues Additional time Not tested Comments Rolling []  []  [] []    []    []  []  [] [] [] [] Supine to sit []  []  [] [x]  []  []  []  [x] [] [] [] Sit to supine []  []  [] [x]  []  []  []  [] [] [] [] Sit to stand []  []  [x] []  []  []  []  [] [] [] []   
Stand to sit []  []  [x] []  []  []  []  [] [] [] [] Bed to chair transfers []  []  [] []  []  []  []  [] [] [] [] Balance Good Shira End Poor Unable Not tested Comments Sitting static []  [x]  []  []  [] Sitting dynamic []  [x]  []  []  []   
Standing static []  [x]  []  []  []   
Standing dynamic []  [x]  []  []  [] Mobility/Gait:  
Level of Assistance: Supervision Assistive Device: rolling walker Distance Ambulated: 5 feet Left Lower Extremity: FWB Right Lower Extremity: FWB Base of Support: center of gravity altered Speed/Tisha: pace decreased (<100 feet/min) Step Length: left shortened and right shortened Swing Pattern: [facility] Stance: [facility] Gait Abnormalities: altered arm swing and step to gait Pain: Pain in L knee, no rating given Vital Signs Temp: 97.6 °F (36.4 °C) Pulse (Heart Rate): 61    
BP: 162/72 Resp Rate: 18    
O2 Sat (%): 100 % Activity Tolerance:  
Fair, limited by dizziness Please refer to the flowsheet for vital signs taken during this treatment. After treatment:  
[]         Patient left in no apparent distress sitting up in chair 
[x]         Patient left in no apparent distress in bed 
[x]         Call bell left within reach 
[]         Nursing notified 
[]         Caregiver present 
[]         Bed alarm activated COMMUNICATION/EDUCATION:  
[x]         Fall prevention education was provided and the patient/caregiver indicated understanding. [x]         Patient/family have participated as able in goal setting and plan of care. [x]         Patient/family agree to work toward stated goals and plan of care. []         Patient understands intent and goals of therapy, but is neutral about his/her participation. []         Patient is unable to participate in goal setting and plan of care. Recommendations for nursing: 
Written on communication board: up with assist x 1 Thank you for this referral. 
Venus Damian Time Calculation: 26 mins

## 2018-08-01 NOTE — PROGRESS NOTES
conducted an initial consultation and Spiritual Assessment for Cande Vu, who is a 80 y.o.,female. Patients Primary Language is: [place]. According to the patients EMR Mormonism Affiliation is: [place]. The reason the Patient came to the hospital is:  
Patient Active Problem List  
 Diagnosis Date Noted  Syncope 08/01/2018  Hypokalemia 08/01/2018  
 HTN (hypertension) 08/01/2018  Knee pain, left 08/01/2018  Weakness 08/01/2018  Hypomagnesemia 07/31/2018  Altered mental status 03/02/2015  CAD (coronary artery disease) 03/02/2015  Polymyalgia rheumatica ([facility] [address] 02/28/2015 The  provided the following Interventions: 
Initiated a relationship of care and support. Explored issues of darien, belief, spirituality and Hinduism/ritual needs while hospitalized. Listened empathically. Patient told  that she could not hear very well with her left ear. Patient requested that  talk more on her right side. Patient discussed the events of yesterday, that the patient had feelings of fear, loneliness and desperation. Patient expressed darien in God and thankfulness that he heard her prayers for help yesterday when she was struggling so much. Patient expressed feelings of thankfulness for the hospital and for the caring staff. Provided chaplaincy education. Provided information about Spiritual Care Services. Offered prayer and assurance of continued prayers on patient's behalf. Chart reviewed. The following outcomes where achieved: 
Patient shared limited information about both their medical narrative and spiritual journey/beliefs.  confirmed Patient's Mormonism Affiliation. Patient processed feeling about current hospitalization. Patient expressed gratitude for 's visit. Assessment: 
Patient does not have any Hinduism/cultural needs that will affect patients preferences in health care.  
There are no spiritual or Cheondoism issues which require intervention at this time. Plan: 
Chaplains will continue to follow and will provide pastoral care on an as needed/requested basis.  recommends bedside caregivers page  on duty if patient shows signs of acute spiritual or emotional distress. Tova Benavidez  Intern Spiritual Care  
[phone]
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
## 2018-08-01 NOTE — PROGRESS NOTES
Reason for Admission:   Syncope & collapse RRAT Score: 11 Plan for utilizing home health:   Not at this time, will need SNF. Likelihood of Readmission:  Low/green Transition of Care Plan:   SNF for rehab & then home with home health & out-pt follow up. Chart reviewed. Met with pt., verified all demographics. States has MCR/Fep BC ins. Email sent to registration to correct marital status to  &  to her, as her spouse passed away 18 months ago. [facility] Dr. Jamilah Edmonds is her PCP, last seen unknown. NOK:  Son, whom lives in [address] [place] [address], Gita Hoffman: [phone]. Lives alone. Has 2 canes. Has been fairly independent until her fall yesterday. Asked her about SNF for rehab, she is agreeable. SNF list provided. She would prefer [facility], posted in e-discharge. Will cont to follow. May Monroy RN,ext [phone]. Patient has designated _her son_______________________ to participate in his/her discharge plan and to receive any needed information. Name: Gita Hoffman Address:  [address] [place] [address] Phone number:  [phone] Care Management Interventions PCP Verified by CM: Yes (not sure) Mode of Transport at Discharge: Self Transition of Care Consult (CM Consult): Discharge Planning Discharge Durable Medical Equipment: No 
Physical Therapy Consult: Yes Occupational Therapy Consult: No 
Speech Therapy Consult: No 
Current Support Network: Lives Alone Plan discussed with Pt/Family/Caregiver: Yes Discharge Location Discharge Placement: Skilled nursing facility

## 2018-08-01 NOTE — PROGRESS NOTES
Nutrition initial assessment/Plan of care RECOMMENDATIONS:  
 
1. Regular diet 2. Monitor weight, labs and PO intake 3. RD to follow GOALS:  
 
1. PO intake meets >75% of protein/calorie needs by 8/8 
2. Weight Maintenance (+/- 1-2 lb by 8/8) ASSESSMENT:  
 
Weight status is classified as normal per BMI of 20. However, patient is at nutrition risk due to BMI below 23 with patient above 72years of age. PO intake is adequate. Labs noted. Nutrition recommendations listed. RD to follow. Nutrition Diagnoses: No nutrition diagnosis at this time. Nutrition Risk:  [] High  [] Moderate [x]  Low SUBJECTIVE/OBJECTIVE:  
  
Admitted with syncope and collapse. Patient with wound (left elbow, bruise to forehead, lower back) from fall. Patient reports having a good appetite and eating all of meals. Observed 100% intake of lunch meal during visit. States weight has been stable at 121 lb. Denies food allergies and problems with chewing/swallowing. Will monitor. Information Obtained from:  
 [x] Chart Review [x] Patient 
 [] Family/Caregiver 
 [] Nurse/Physician 
 [] Interdisciplinary Meeting/Rounds Diet: Regular diet Medications: [x] Reviewed Allergies: [x] Reviewed Encounter Diagnoses ICD-10-CM ICD-9-CM 1. Syncope and collapse R55 780.2 2. Hypomagnesemia E83.42 275.2 3. Contusion of forehead, initial encounter S00.83XA 920  
4. Hypertension, accelerated I10 401.0 Past Medical History:  
Diagnosis Date  Arthritis  CAD (coronary artery disease) 2001  
 3 coronary stents  Cardiac complication  Hypertension  Migraine 3/1/2015  Polymyalgia rheumatica ([facility] [address] 2/28/2015 Labs:  Lab Results Component Value Date/Time  Sodium 142 08/01/2018 06:50 AM  
 Potassium 4.1 08/01/2018 06:50 AM  
 Chloride 107 08/01/2018 06:50 AM  
 CO2 28 08/01/2018 06:50 AM  
 Anion gap 7 08/01/2018 06:50 AM  
 Glucose 94 08/01/2018 06:50 AM  
 BUN 24 (H) 08/01/2018 06:50 AM  
 Creatinine 0.75 08/01/2018 06:50 AM  
 Calcium 8.8 08/01/2018 06:50 AM  
 Magnesium 2.1 08/01/2018 06:50 AM  
 Albumin 3.5 07/31/2018 08:12 PM  
 
Anthropometrics: BMI (calculated): 20 Last 3 Recorded Weights in this Encounter 07/31/18 1955 08/01/18 4548 Weight: 54.4 kg (120 lb) 54.4 kg (120 lb) Ht Readings from Last 1 Encounters:  
07/31/18 5' 5\" (1.651 m) Patient Vitals for the past 100 hrs: 
 % Diet Eaten 08/01/18 0958 75 % IBW: 125 lb %IBW: 96% UBW: 121 lb %UBW: 101% [] Weight Loss [] Weight Gain [x] Weight Stable Estimated Nutrition Needs: [x] MSJ Calories: 1881-5056 Kcal Based on:   [x] Actual BW   
Protein:   65 g Based on:   [x] Actual BW Fluid:       1500 ml Based on:   [x] Actual BW  
 
 [x] No Cultural, Restoration or ethnic dietary need identified. [] Cultural, Restoration and ethnic food preferences identified and addressed Wt Status:  [x] Normal (18.6 - 24.9) [] Underweight (<18.5) [] Overweight (25 - 29.9) [] Mild Obesity (30 - 34.9)  [] Moderate Obesity (35 - 39.9) [] Morbid Obesity (40+) Nutrition Problems Identified:  
[] Suboptimal PO intake  
[] Food Allergies [] Difficulty chewing/swallowing/poor dentition 
[] Constipation/Diarrhea  
[] Nausea/Vomiting  
[x] None 
[] Other:  
 
Plan:  
[] Therapeutic Diet 
[]  Obtained/adjusted food preferences/tolerances and/or snacks options  
[]  Supplements added  
[] Occupational therapy following for feeding techniques []  HS snack added  
[]  Modify diet texture  
[]  Modify diet for food allergies []  Assist with menu selection  
[x]  Monitor PO intake on meal rounds  
[x]  Continue inpatient monitoring and intervention  
[]  Participated in discharge planning/Interdisciplinary rounds/Team meetings  
[]  Other:  
 
Education Needs: 
 [] Not appropriate for teaching at this time due to: 
 [x] Identified and addressed Nutrition Monitoring and Evaluation: 
[x] Continue ongoing monitoring and intervention [] Other [address]

## 2018-08-02 PROCEDURE — 74011250637 HC RX REV CODE- 250/637: Performed by: INTERNAL MEDICINE

## 2018-08-02 PROCEDURE — 77030038269 HC DRN EXT URIN PURWCK BARD -A

## 2018-08-02 PROCEDURE — 97530 THERAPEUTIC ACTIVITIES: CPT

## 2018-08-02 PROCEDURE — 65660000000 HC RM CCU STEPDOWN

## 2018-08-02 PROCEDURE — 74011250636 HC RX REV CODE- 250/636: Performed by: INTERNAL MEDICINE

## 2018-08-02 RX ORDER — DOCUSATE SODIUM 100 MG/1
100 CAPSULE, LIQUID FILLED ORAL 2 TIMES DAILY
Status: DISCONTINUED | OUTPATIENT
Start: 2018-08-02 | End: 2018-08-04 | Stop reason: HOSPADM

## 2018-08-02 RX ADMIN — HYDRALAZINE HYDROCHLORIDE 50 MG: 50 TABLET, FILM COATED ORAL at 18:05

## 2018-08-02 RX ADMIN — CLONIDINE HYDROCHLORIDE 0.2 MG: 0.1 TABLET ORAL at 18:05

## 2018-08-02 RX ADMIN — METHYLPREDNISOLONE SODIUM SUCCINATE 40 MG: 40 INJECTION, POWDER, FOR SOLUTION INTRAMUSCULAR; INTRAVENOUS at 18:05

## 2018-08-02 RX ADMIN — SIMVASTATIN 40 MG: 40 TABLET, FILM COATED ORAL at 21:59

## 2018-08-02 RX ADMIN — CLONIDINE HYDROCHLORIDE 0.2 MG: 0.1 TABLET ORAL at 02:00

## 2018-08-02 RX ADMIN — HYDRALAZINE HYDROCHLORIDE 50 MG: 50 TABLET, FILM COATED ORAL at 21:59

## 2018-08-02 RX ADMIN — ATENOLOL 50 MG: 50 TABLET ORAL at 09:23

## 2018-08-02 RX ADMIN — CLONIDINE HYDROCHLORIDE 0.2 MG: 0.1 TABLET ORAL at 09:23

## 2018-08-02 RX ADMIN — METHYLPREDNISOLONE SODIUM SUCCINATE 40 MG: 40 INJECTION, POWDER, FOR SOLUTION INTRAMUSCULAR; INTRAVENOUS at 05:17

## 2018-08-02 RX ADMIN — DOCUSATE SODIUM 100 MG: 100 CAPSULE, LIQUID FILLED ORAL at 20:24

## 2018-08-02 RX ADMIN — PANTOPRAZOLE SODIUM 40 MG: 40 TABLET, DELAYED RELEASE ORAL at 09:23

## 2018-08-02 RX ADMIN — HYDRALAZINE HYDROCHLORIDE 50 MG: 50 TABLET, FILM COATED ORAL at 09:23

## 2018-08-02 RX ADMIN — LOSARTAN POTASSIUM 100 MG: 50 TABLET ORAL at 09:23

## 2018-08-02 RX ADMIN — ASPIRIN 81 MG: 81 TABLET, COATED ORAL at 09:23

## 2018-08-02 RX ADMIN — HYDROCHLOROTHIAZIDE 25 MG: 25 TABLET ORAL at 09:23

## 2018-08-02 RX ADMIN — ATENOLOL 50 MG: 50 TABLET ORAL at 20:24

## 2018-08-02 NOTE — PROGRESS NOTES
General Internal Medicine/Geriatrics Patient: Valeria Garcia MRN: [id number]  CSN: [id number] YOB: 1925  Age: 80 y.o. Sex: female DOA: 7/31/2018 LOS:  LOS: 1 day Subjective:  
 
Feels some better Pain less Alert Working with PT Review of Systems: 
Eyes: negative Ears, Nose, Mouth, Throat, and Face: negative Respiratory: negative for dyspnea on exertion Cardiovascular: negative for chest pain Gastrointestinal: negative for nausea, vomiting and diarrhea Genitourinary:negative for dysuria and hematuria Integument/Breast: negative Hematologic/Lymphatic: negative for bleeding Musculoskeletal:negative for arthralgias Neurological: negative for weakness Behavioral/Psychiatric: negative for behavior problems Endocrine: negative for temperature intolerance Allergic/Immunologic: negative for hay fever Objective:  
 
Physical Exam: 
Patient Vitals for the past 24 hrs: 
 Temp Pulse Resp BP SpO2  
08/02/18 1806 - - - 135/54 -  
08/02/18 1623 97.6 °F (36.4 °C) 85 18 150/66 99 % 08/02/18 1239 98 °F (36.7 °C) 60 18 120/56 97 % 08/02/18 0806 98.3 °F (36.8 °C) 60 18 153/76 98 % 08/02/18 0314 98.4 °F (36.9 °C) 65 18 132/54 98 % 08/01/18 2310 97.3 °F (36.3 °C) 66 18 120/70 97 % 08/01/18 2003 97.9 °F (36.6 °C) 62 18 126/61 99 % AF, VSS HEENT: wnl NECK:  No venous distention or adenopathy HEART: RRR, no rubs or gallops LUNGS: Clear to auscultation ABDOMEN: soft with active BS. No tenderness, no distention, no organomegaly EXT: warm,no edema, Left Knee DJD SKIN: Normal turgor, no breaks NEURO: Awake, alert, non focal 
 
Intake and Output: 
Current Shift:  08/02 0701 - 08/02 1900 In: 300 [P.O.:300] Out: - Last three shifts:  07/31 1901 - 08/02 0700 In: 840 [P.O.:840] Out: 1500 [Urine:1500] No results found for this or any previous visit (from the past 24 hour(s)). Xr Chest [facility] Result Date: 8/2/2018 CHEST AP PORTABLE Indication: Syncope. Comparison: 04/25/2016. Findings: The lungs appear clear. No evidence for pneumothorax or pleural effusion. Cardiac silhouette and pulmonary vascularity appear within normal limits. Aortic atherosclerosis noted. Right cardiophrenic opacity again noted consistent with known large hiatal hernia. IMPRESSION: No acute cardiopulmonary disease. Large hiatal hernia. Current Facility-Administered Medications Medication Dose Route Frequency  acetaminophen (TYLENOL) tablet 650 mg  650 mg Oral Q6H PRN  
 albuterol-ipratropium (DUO-NEB) 2.5 MG-0.5 MG/3 ML  3 mL Nebulization TID PRN  
 aspirin delayed-release tablet 81 mg  81 mg Oral DAILY  atenolol (TENORMIN) tablet 50 mg  50 mg Oral Q12H  cloNIDine HCl (CATAPRES) tablet 0.2 mg  0.2 mg Oral Q8H  
 ergocalciferol (ERGOCALCIFEROL) capsule 50,000 Units  50,000 Units Oral Q7D  
 hydrALAZINE (APRESOLINE) tablet 50 mg  50 mg Oral TID  losartan (COZAAR) tablet 100 mg  100 mg Oral DAILY  pantoprazole (PROTONIX) tablet 40 mg  40 mg Oral ACB  simvastatin (ZOCOR) tablet 40 mg  40 mg Oral QHS  hydroCHLOROthiazide (HYDRODIURIL) tablet 25 mg  25 mg Oral DAILY  methylPREDNISolone (PF) (SOLU-MEDROL) injection 40 mg  40 mg IntraVENous Q8H Lab Results Component Value Date/Time Glucose 94 08/01/2018 06:50 AM  
 Glucose 95 07/31/2018 08:12 PM  
 Glucose 98 07/10/2015 04:18 PM  
 Glucose 82 03/05/2015 08:46 AM  
 Glucose 97 03/04/2015 07:18 AM  
  
 
Assessment Active Problems: Hypomagnesemia (7/31/2018) Syncope (8/1/2018) Hypokalemia (8/1/2018) HTN (hypertension) (8/1/2018) Knee pain, left (8/1/2018) Weakness (8/1/2018) Plan Same meds Gradually decrease steroids PT 
SNF Sherri Koenig MD 
8/2/2018, 6:33 PM

## 2018-08-02 NOTE — PROGRESS NOTES
Problem: Mobility Impaired (Adult and Pediatric) Goal: *Acute Goals and Plan of Care (Insert Text) Physical Therapy Goals Initiated 8/1/2018 and to be accomplished within 7 days. 1.  Patient will complete all bed mobility with modified independence in order to prepare for EOB/OOB activity. 2.  Patient will perform sit <> stand with modified independence in order to prepare for OOB/gait activity. 3.  Patient will perform bed to chair transfers with modified independence in order to promote mobility and encourage seated activity to progress towards their prior level of function. 4.  Patient will ambulate 150 feet with modified independence using LRAD in order to prepare for safe negotiation of their environment. 5.  Patient will ascend/descend 5 steps with S handrail use with modified independence in order to prepare for safe exit/entry into their home environment. Outcome: Progressing Towards Goal 
 
PHYSICAL THERAPY: Daily TREATMENT Note INPATIENT: Medicare: Hospital Day: 3 Patient: Dwayne Orozco (67 y.o. female)    Date: 8/2/2018 Primary Diagnosis: Syncope and collapse Hypomagnesemia Syncope  
 ,  ,  
Precautions: Fall (Syncope) WBAT Chart, physical therapy assessment, plan of care and goals were reviewed. PLOF:Independent ASSESSMENT: 
Pt very pleasant;  Reporting no pain but stiffness secondary to arthritis and fall; required additional time and CGA for bed mobility and sup<>sit transfer. Pt CGA sit<>stand X3 with verbal cues for hand placement; gait training X 80 ft with RW with verbal cues for walker management. Educated pt on use of cell phone or other means of communication at home for safety, as she reports history of multiple falls. Progression toward goals: 
      Improving appropriately and progressing toward goals PLAN: 
Patient continues to benefit from skilled intervention to address the above impairments.   Continue treatment per established plan of care. 
 
EDUCATION:  
Education:  Patient was educated on the following topics: safety during transfers Barriers to Learning/Limitations: None Compensate with: visual, verbal, tactile, kinesthetic cues/model Discharge Recommendations:  [place] [place] Further Equipment Recommendations for Discharge:  N/A Factors which may impact discharge planning: none SUBJECTIVE:  
Patient stated I still have bruises from when I fell.  OBJECTIVE DATA SUMMARY:  
Critical Behavior: 
Neurologic State: Alert, Appropriate for age, Eyes open spontaneously Orientation Level: Appropriate for age, Oriented X4 Cognition: Follows commands Safety/Judgement: Fall prevention, Awareness of environment G CODE:Mobility [id number] Current  CJ= 20-39%   Goal  CI= 1-19%. The severity rating is based on the Other [address] Standing Balance Scale 
0: Pt performs 25% or less of standing activity (Max assist) CN, 100% impaired. 1: Pt supports self with upper extremities but requires therapist assistance. Pt performs 25-50% of effort (Mod assist) CM, 80% to <100% impaired. 1+: Pt supports self with upper extremities but requires therapist assistance. Pt performs >50% effort. (Min assist). CL, 60% to <80% impaired. 2: Pt supports self independently with both upper extremities (walker, crutches, parallel bars). CL, 60% to <80% impaired. 2+: Pt support self independently with 1 upper extremity (cane, crutch, 1 parallel bar). CK, 40% to <60% impaired. 3: Pt stands without upper extremity support for up to 30 seconds. CK, 40% to <60% impaired. 3+: Pt stands without upper extremity support for 30 seconds or greater. CJ, 20% to <40% impaired. 4: Pt independently moves and returns center of gravity 1-2 inches in one plane. CJ, 20% to <40% impaired. 4+: Pt independently moves and returns center of gravity 1-2 inches in multiple planes. CI, 1% to <20% impaired.  
5: Pt independently moves and returns center of gravity in all planes greater than 2 inches. CH, 0% impaired. Functional Mobility: 
 
 
Functional Status Indep (I) Mod I Super-vision/CGA Min A Mod A Max A Total A Assist x2 Verbal cues Additional time Not tested Comments Rolling []  []  [x] []    []    []  []  [] [x] [x] [] Supine to sit []  []  [x] []  []  []  []  [] [x] [x] [] Sit to supine []  []  [x] []  []  []  []  [] [x] [x] [] Sit to stand []  []  [x] []  []  []  []  [] [x] [x] [] Stand to sit []  []  [x] []  []  []  []  [] [x] [x] [] Bed to chair transfers []  []  [x] []  []  []  []  [] [x] [x] [] Balance Good Tequila Finnegan Poor Unable Not tested Comments Sitting static [x]  []  []  []  [] Sitting dynamic [x]  []  []  []  []   
Standing static [x]  []  []  []  []   
Standing dynamic [x]  []  []  []  [] With support Mobility/Gait:  
Level of Assistance: Contact guard assistance Assistive Device: rolling walker Distance Ambulated: 80 feet Left Lower Extremity: WBAT Right Lower Extremity: WBAT Base of Support: [facility] Speed/Tisha: pace decreased (<100 feet/min) Step Length: [facility] Swing Pattern: [facility] Stance: [facility] Gait Abnormalities: decreased step clearance Stairs:  
Level of Assistance: unable at this time Vital Signs Temp: 98.3 °F (36.8 °C) Pulse (Heart Rate): 60    
BP: 153/76 Resp Rate: 18    
O2 Sat (%): 98 % Pain: 
Pre treatment pain level:0 Post treatment pain level:0 Pain Scale 1: Visual 
Pain Intensity 1: 0 Activity Tolerance:  
Good After treatment:  
 
Patient left in no apparent distress in bed Call bell left within reach Nursing notified Bed alarm activated Sally Pruett PTA Time Calculation: 27 mins

## 2018-08-02 NOTE — PROGRESS NOTES
Problem: Pressure Injury - Risk of 
Goal: *Prevention of pressure injury Document Josiah Scale and appropriate interventions in the flowsheet. Outcome: Progressing Towards Goal 
Pressure Injury Interventions: 
  
 
Moisture Interventions: Absorbent underpads, Internal/External urinary devices Activity Interventions: Pressure redistribution bed/mattress(bed type) Mobility Interventions: HOB 30 degrees or less Nutrition Interventions: Document food/fluid/supplement intake Friction and Shear Interventions: HOB 30 degrees or less, Foam dressings/transparent film/skin sealants Problem: Falls - Risk of 
Goal: *Absence of Falls Document Dale Pabon Fall Risk and appropriate interventions in the flowsheet. Outcome: Progressing Towards Goal 
Fall Risk Interventions: 
Mobility Interventions: Bed/chair exit alarm, Strengthening exercises (ROM-active/passive) Medication Interventions: Bed/chair exit alarm, Evaluate medications/consider consulting pharmacy Elimination Interventions: Bed/chair exit alarm, Call light in reach, Patient to call for help with toileting needs, Toileting schedule/hourly rounds History of Falls Interventions: Bed/chair exit alarm, Door open when patient unattended, Evaluate medications/consider consulting pharmacy, Room close to nurse's station

## 2018-08-02 NOTE — PROGRESS NOTES
Bedside and Verbal shift change report given to Bill Marc (oncoming nurse) by Lesvia Johnson (offgoing nurse). Report included the following information SBAR, Kardex and MAR.

## 2018-08-02 NOTE — PROGRESS NOTES
Spoke to ashley, [facility] can accept, pt needs 3 over night inpt stay, cannot transfer until sat changed to inpt 8/1.

## 2018-08-02 NOTE — PROGRESS NOTES
Problem: Pressure Injury - Risk of 
Goal: *Prevention of pressure injury Document Josiah Scale and appropriate interventions in the flowsheet. Outcome: Progressing Towards Goal 
Pressure Injury Interventions: 
  
 
Moisture Interventions: Absorbent underpads, Internal/External urinary devices Activity Interventions: Pressure redistribution bed/mattress(bed type) Mobility Interventions: HOB 30 degrees or less Nutrition Interventions: Document food/fluid/supplement intake Friction and Shear Interventions: HOB 30 degrees or less, Foam dressings/transparent film/skin sealants Problem: Falls - Risk of 
Goal: *Absence of Falls Document Lambert Reas Fall Risk and appropriate interventions in the flowsheet. Outcome: Progressing Towards Goal 
Fall Risk Interventions: 
Mobility Interventions: Bed/chair exit alarm, Strengthening exercises (ROM-active/passive) Medication Interventions: Bed/chair exit alarm, Evaluate medications/consider consulting pharmacy Elimination Interventions: Bed/chair exit alarm, Call light in reach, Patient to call for help with toileting needs, Toileting schedule/hourly rounds History of Falls Interventions: Bed/chair exit alarm, Door open when patient unattended, Evaluate medications/consider consulting pharmacy, Room close to nurse's station

## 2018-08-02 NOTE — PROGRESS NOTES
1930: AOX4, on rrom air, resting in bed, bed alarms on; shift assessment done; maintained on fall precautions; IV heplock dislodged; g 22 IV heplock inserted on right forearm x 1 attempt; no redness/swelling noted; mepilex dressings applied over bilateral heels; needs within reach; NSR 1st degree AVB on tele 2135: scheduled meds given; no complaints voiced 2300: coffee given as requested; gown changed; assisted with further needs 0030: sleeping comfortably 0200: resting in bed, due med given; callbell within reach 
 
0500: sleeping; callbell within reach 
 
0610:a wake, pericare done; pad and linens changed 0710: Bedside and Verbal shift change report given to Jocelin Munroe (oncoming nurse) by Ad Gan RN (offgoing nurse). Report included the following information SBAR, Kardex, Intake/Output, Recent Results and Cardiac Rhythm NSR.

## 2018-08-02 NOTE — PROGRESS NOTES
Bedside and Verbal shift change report given to NANO (oncoming nurse) by Guerita Niño (offgoing nurse). Report included the following information SBAR, Kardex and MAR. Pt is currently resting. She is on room air with no visible S/S of SOB. 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 Pt is incontinent of urine so has a purewick in place for I/O's. She also has multiple wounds/trauma to the skin from her fall at home. Pt has no needs at this time.

## 2018-08-03 PROCEDURE — 74011250636 HC RX REV CODE- 250/636: Performed by: INTERNAL MEDICINE

## 2018-08-03 PROCEDURE — 74011250637 HC RX REV CODE- 250/637: Performed by: INTERNAL MEDICINE

## 2018-08-03 PROCEDURE — 65660000000 HC RM CCU STEPDOWN

## 2018-08-03 PROCEDURE — 77030038269 HC DRN EXT URIN PURWCK BARD -A

## 2018-08-03 RX ADMIN — HYDRALAZINE HYDROCHLORIDE 50 MG: 50 TABLET, FILM COATED ORAL at 17:49

## 2018-08-03 RX ADMIN — CLONIDINE HYDROCHLORIDE 0.2 MG: 0.1 TABLET ORAL at 17:49

## 2018-08-03 RX ADMIN — HYDRALAZINE HYDROCHLORIDE 50 MG: 50 TABLET, FILM COATED ORAL at 22:57

## 2018-08-03 RX ADMIN — DOCUSATE SODIUM 100 MG: 100 CAPSULE, LIQUID FILLED ORAL at 12:25

## 2018-08-03 RX ADMIN — HYDROCHLOROTHIAZIDE 25 MG: 25 TABLET ORAL at 12:26

## 2018-08-03 RX ADMIN — ASPIRIN 81 MG: 81 TABLET, COATED ORAL at 12:27

## 2018-08-03 RX ADMIN — METHYLPREDNISOLONE SODIUM SUCCINATE 30 MG: 40 INJECTION, POWDER, FOR SOLUTION INTRAMUSCULAR; INTRAVENOUS at 09:44

## 2018-08-03 RX ADMIN — CLONIDINE HYDROCHLORIDE 0.2 MG: 0.1 TABLET ORAL at 01:06

## 2018-08-03 RX ADMIN — ACETAMINOPHEN 650 MG: 325 TABLET, FILM COATED ORAL at 17:49

## 2018-08-03 RX ADMIN — HYDRALAZINE HYDROCHLORIDE 50 MG: 50 TABLET, FILM COATED ORAL at 12:25

## 2018-08-03 RX ADMIN — DOCUSATE SODIUM 100 MG: 100 CAPSULE, LIQUID FILLED ORAL at 17:49

## 2018-08-03 RX ADMIN — METHYLPREDNISOLONE SODIUM SUCCINATE 30 MG: 40 INJECTION, POWDER, FOR SOLUTION INTRAMUSCULAR; INTRAVENOUS at 17:49

## 2018-08-03 RX ADMIN — LOSARTAN POTASSIUM 100 MG: 50 TABLET ORAL at 12:26

## 2018-08-03 RX ADMIN — SIMVASTATIN 40 MG: 40 TABLET, FILM COATED ORAL at 22:09

## 2018-08-03 RX ADMIN — METHYLPREDNISOLONE SODIUM SUCCINATE 30 MG: 40 INJECTION, POWDER, FOR SOLUTION INTRAMUSCULAR; INTRAVENOUS at 01:06

## 2018-08-03 RX ADMIN — ATENOLOL 50 MG: 50 TABLET ORAL at 22:09

## 2018-08-03 RX ADMIN — ATENOLOL 50 MG: 50 TABLET ORAL at 12:26

## 2018-08-03 RX ADMIN — PANTOPRAZOLE SODIUM 40 MG: 40 TABLET, DELAYED RELEASE ORAL at 12:25

## 2018-08-03 RX ADMIN — CLONIDINE HYDROCHLORIDE 0.2 MG: 0.1 TABLET ORAL at 12:25

## 2018-08-03 NOTE — PROGRESS NOTES
Problem: Pressure Injury - Risk of 
Goal: *Prevention of pressure injury Document Josiah Scale and appropriate interventions in the flowsheet. Outcome: Progressing Towards Goal 
Pressure Injury Interventions: 
  
 
Moisture Interventions: Check for incontinence Q2 hours and as needed Activity Interventions: Pressure redistribution bed/mattress(bed type) Mobility Interventions: Pressure redistribution bed/mattress (bed type) Nutrition Interventions: Document food/fluid/supplement intake Friction and Shear Interventions: HOB 30 degrees or less Problem: Falls - Risk of 
Goal: *Absence of Falls Document Easter Getting Fall Risk and appropriate interventions in the flowsheet. Outcome: Progressing Towards Goal 
Fall Risk Interventions: 
Mobility Interventions: Patient to call before getting OOB Medication Interventions: Patient to call before getting OOB Elimination Interventions: Call light in reach History of Falls Interventions: Door open when patient unattended

## 2018-08-03 NOTE — PROGRESS NOTES
Bedside and Verbal shift change report given to Rose Goodson (oncoming nurse) by Giovanni Moreira (offgoing nurse). Report included the following information SBAR, Kardex and MAR.

## 2018-08-03 NOTE — PROGRESS NOTES
Bedside and Verbal shift change report given to NANO (oncoming nurse) by Raji Mayes (offgoing nurse). Report included the following information SBAR, Kardex and MAR. Pt is AOx4 with some confusion. Raji Mayes, the night RN, reported that the patient became agitated and confused more than usual this morning but was easily reoriented. She is hard at hearing and incontinent of urine. Pt has no needs at this time.

## 2018-08-03 NOTE — ANCILLARY DISCHARGE INSTRUCTIONS
Patient and/or next of kin has been given the [facility] Important Message From Medicare About Your Rights\" letter and all questions were answered. Paper copy signed, placed in chart.

## 2018-08-03 NOTE — PROGRESS NOTES
Problem: Pressure Injury - Risk of 
Goal: *Prevention of pressure injury Document Josiah Scale and appropriate interventions in the flowsheet. Outcome: Progressing Towards Goal 
Pressure Injury Interventions: 
  
 
Moisture Interventions: Check for incontinence Q2 hours and as needed Activity Interventions: Pressure redistribution bed/mattress(bed type) Mobility Interventions: HOB 30 degrees or less Nutrition Interventions: Document food/fluid/supplement intake Friction and Shear Interventions: HOB 30 degrees or less Problem: Falls - Risk of 
Goal: *Absence of Falls Document Ayana Andrews Fall Risk and appropriate interventions in the flowsheet. Outcome: Progressing Towards Goal 
Fall Risk Interventions: 
Mobility Interventions: Patient to call before getting OOB Medication Interventions: Evaluate medications/consider consulting pharmacy Elimination Interventions: Call light in reach History of Falls Interventions: Evaluate medications/consider consulting pharmacy

## 2018-08-03 NOTE — PROGRESS NOTES
2000-no signs of distress. Ordered meds given throughout night. Patient slept majority of the night. Patient did have a period of disorientation during the night but was quickly made aware of her surroundings. Bedside shift change report given to PERICO MCGILL(oncoming nurse) by Alexandre Engle (offgoing nurse). Report included the following information SBAR.

## 2018-08-03 NOTE — PROGRESS NOTES
Attempted PT X2 this am, per nursing pt had a difficult night last night. Will continue to follow.   Tanya López, PTA

## 2018-08-04 ENCOUNTER — HOSPITAL ENCOUNTER (INPATIENT)
Age: 83
LOS: 17 days | Discharge: HOME | End: 2018-08-21
Attending: INTERNAL MEDICINE | Admitting: INTERNAL MEDICINE

## 2018-08-04 VITALS
RESPIRATION RATE: 18 BRPM | BODY MASS INDEX: 20.99 KG/M2 | HEIGHT: 65 IN | SYSTOLIC BLOOD PRESSURE: 109 MMHG | OXYGEN SATURATION: 98 % | DIASTOLIC BLOOD PRESSURE: 57 MMHG | TEMPERATURE: 97.4 F | WEIGHT: 126 LBS | HEART RATE: 55 BPM

## 2018-08-04 PROCEDURE — 74011250636 HC RX REV CODE- 250/636: Performed by: INTERNAL MEDICINE

## 2018-08-04 PROCEDURE — 97530 THERAPEUTIC ACTIVITIES: CPT

## 2018-08-04 PROCEDURE — 77030038269 HC DRN EXT URIN PURWCK BARD -A

## 2018-08-04 PROCEDURE — 74011250637 HC RX REV CODE- 250/637: Performed by: INTERNAL MEDICINE

## 2018-08-04 PROCEDURE — 77030037878 HC DRSG MEPILEX >48IN BORD MOLN -B

## 2018-08-04 RX ORDER — HYDRALAZINE HYDROCHLORIDE 50 MG/1
50 TABLET, FILM COATED ORAL 3 TIMES DAILY
Status: DISCONTINUED | OUTPATIENT
Start: 2018-08-05 | End: 2018-08-11

## 2018-08-04 RX ORDER — PREDNISONE 5 MG/1
5 TABLET ORAL
Status: DISCONTINUED | OUTPATIENT
Start: 2018-08-05 | End: 2018-08-21 | Stop reason: HOSPADM

## 2018-08-04 RX ORDER — ERGOCALCIFEROL 1.25 MG/1
50000 CAPSULE ORAL
Status: DISCONTINUED | OUTPATIENT
Start: 2018-08-05 | End: 2018-08-21 | Stop reason: HOSPADM

## 2018-08-04 RX ORDER — CLONIDINE HYDROCHLORIDE 0.1 MG/1
0.2 TABLET ORAL EVERY 8 HOURS
Status: DISCONTINUED | OUTPATIENT
Start: 2018-08-04 | End: 2018-08-07

## 2018-08-04 RX ORDER — GUAIFENESIN 100 MG/5ML
81 LIQUID (ML) ORAL DAILY
Status: DISCONTINUED | OUTPATIENT
Start: 2018-08-05 | End: 2018-08-21 | Stop reason: HOSPADM

## 2018-08-04 RX ORDER — ACETAMINOPHEN 325 MG/1
650 TABLET ORAL
Status: DISCONTINUED | OUTPATIENT
Start: 2018-08-04 | End: 2018-08-21 | Stop reason: HOSPADM

## 2018-08-04 RX ORDER — PANTOPRAZOLE SODIUM 40 MG/1
40 TABLET, DELAYED RELEASE ORAL
Status: DISCONTINUED | OUTPATIENT
Start: 2018-08-05 | End: 2018-08-21 | Stop reason: HOSPADM

## 2018-08-04 RX ORDER — DOCUSATE SODIUM 100 MG/1
100 CAPSULE, LIQUID FILLED ORAL 2 TIMES DAILY
Qty: 60 CAP | Refills: 2 | Status: SHIPPED
Start: 2018-08-04 | End: 2018-11-02

## 2018-08-04 RX ORDER — DOCUSATE SODIUM 100 MG/1
100 CAPSULE, LIQUID FILLED ORAL 2 TIMES DAILY
Status: DISCONTINUED | OUTPATIENT
Start: 2018-08-05 | End: 2018-08-21 | Stop reason: HOSPADM

## 2018-08-04 RX ORDER — IPRATROPIUM BROMIDE AND ALBUTEROL SULFATE 2.5; .5 MG/3ML; MG/3ML
3 SOLUTION RESPIRATORY (INHALATION)
Status: DISCONTINUED | OUTPATIENT
Start: 2018-08-04 | End: 2018-08-21 | Stop reason: HOSPADM

## 2018-08-04 RX ORDER — ATENOLOL 50 MG/1
50 TABLET ORAL EVERY 12 HOURS
Status: DISCONTINUED | OUTPATIENT
Start: 2018-08-05 | End: 2018-08-21 | Stop reason: HOSPADM

## 2018-08-04 RX ORDER — SIMVASTATIN 40 MG/1
40 TABLET, FILM COATED ORAL
Status: DISCONTINUED | OUTPATIENT
Start: 2018-08-04 | End: 2018-08-21 | Stop reason: HOSPADM

## 2018-08-04 RX ADMIN — CLONIDINE HYDROCHLORIDE 0.2 MG: 0.1 TABLET ORAL at 09:29

## 2018-08-04 RX ADMIN — DOCUSATE SODIUM 100 MG: 100 CAPSULE, LIQUID FILLED ORAL at 09:31

## 2018-08-04 RX ADMIN — ATENOLOL 50 MG: 50 TABLET ORAL at 09:31

## 2018-08-04 RX ADMIN — HYDRALAZINE HYDROCHLORIDE 50 MG: 50 TABLET, FILM COATED ORAL at 09:30

## 2018-08-04 RX ADMIN — LOSARTAN POTASSIUM 100 MG: 50 TABLET ORAL at 09:30

## 2018-08-04 RX ADMIN — CLONIDINE HYDROCHLORIDE 0.2 MG: 0.1 TABLET ORAL at 18:38

## 2018-08-04 RX ADMIN — ASPIRIN 81 MG: 81 TABLET, COATED ORAL at 09:29

## 2018-08-04 RX ADMIN — HYDROCHLOROTHIAZIDE 25 MG: 25 TABLET ORAL at 09:30

## 2018-08-04 RX ADMIN — METHYLPREDNISOLONE SODIUM SUCCINATE 20 MG: 40 INJECTION, POWDER, FOR SOLUTION INTRAMUSCULAR; INTRAVENOUS at 18:40

## 2018-08-04 RX ADMIN — PANTOPRAZOLE SODIUM 40 MG: 40 TABLET, DELAYED RELEASE ORAL at 09:26

## 2018-08-04 RX ADMIN — METHYLPREDNISOLONE SODIUM SUCCINATE 20 MG: 40 INJECTION, POWDER, FOR SOLUTION INTRAMUSCULAR; INTRAVENOUS at 05:38

## 2018-08-04 RX ADMIN — CLONIDINE HYDROCHLORIDE 0.2 MG: 0.1 TABLET ORAL at 23:07

## 2018-08-04 RX ADMIN — ACETAMINOPHEN 650 MG: 325 TABLET, FILM COATED ORAL at 10:53

## 2018-08-04 RX ADMIN — SIMVASTATIN 40 MG: 40 TABLET, FILM COATED ORAL at 23:07

## 2018-08-04 RX ADMIN — CLONIDINE HYDROCHLORIDE 0.2 MG: 0.1 TABLET ORAL at 00:00

## 2018-08-04 RX ADMIN — DOCUSATE SODIUM 100 MG: 100 CAPSULE, LIQUID FILLED ORAL at 18:40

## 2018-08-04 NOTE — PROGRESS NOTES
Problem: Mobility Impaired (Adult and Pediatric) Goal: *Acute Goals and Plan of Care (Insert Text) Physical Therapy Goals Initiated 8/1/2018 and to be accomplished within 7 days. 1.  Patient will complete all bed mobility with modified independence in order to prepare for EOB/OOB activity. 2.  Patient will perform sit <> stand with modified independence in order to prepare for OOB/gait activity. 3.  Patient will perform bed to chair transfers with modified independence in order to promote mobility and encourage seated activity to progress towards their prior level of function. 4.  Patient will ambulate 150 feet with modified independence using LRAD in order to prepare for safe negotiation of their environment. 5.  Patient will ascend/descend 5 steps with S handrail use with modified independence in order to prepare for safe exit/entry into their home environment. Outcome: Progressing Towards Goal 
physical Therapy TREATMENT Patient: Demetrius Cortez (74 y.o. female) Date: 8/4/2018 Diagnosis: Syncope and collapse Hypomagnesemia Syncope <principal problem not specified> Precautions: Fall (Syncope) Chart, physical therapy assessment, plan of care and goals were reviewed. PLOF:lives alone, ambulatory with cane ASSESSMENT: 
Pt tearful during session, needed encouragement on progress and the ability to return home after SNF. Juan needed for bed mobility. Voided in bathroom prior to ambulation in pereira. Donned adult brief, disposed of norris-wick. Pt ambulated 70ft total with RW, Juan and VC for RW mgmt. Kyphotic posture, reciprocal gt pattern, decreased step height and length (B)LE, decreased pace, no LOB, moments of LE shaking. Returned to room, sat EOB for 6 minutes then returned to supine. Visitor present. Notified nurse. Education: RW mgmt and safety Progression toward goals: 
[]      Improving appropriately and progressing toward goals [x]      Improving slowly and progressing toward goals 
[]      Not making progress toward goals and plan of care will be adjusted PLAN: 
Patient continues to benefit from skilled intervention to address the above impairments. Continue treatment per established plan of care. Discharge Recommendations:  Elkin Warren Further Equipment Recommendations for Discharge:  rolling walker SUBJECTIVE:  
Patient stated I have to go to the bathroom.  OBJECTIVE DATA SUMMARY:  
Critical Behavior: 
Neurologic State: Alert Orientation Level: Oriented X4 Cognition: Follows commands Safety/Judgement: Fall prevention, Awareness of environment Functional Mobility Training: 
Bed Mobility: 
Supine to Sit: Minimum assistance Sit to Supine: Minimum assistance Transfers: 
Sit to Stand: Minimum assistance Stand to Sit: Minimum assistance Balance: 
Sitting: Intact Standing: Intact; With support Standing - Static: Fair Standing - Dynamic : Fair Ambulation/Gait Training: 
Distance (ft): 70 Feet (ft) Assistive Device: Gait belt;Walker, rolling Ambulation - Level of Assistance: Contact guard assistance;Minimal assistance Gait Abnormalities: Decreased step clearance Speed/Tisha: Slow Step Length: Right shortened;Left shortened [address] [facility] [id number] Current  CJ= 20-39%   Goal  CI= 1-19%. The severity rating is based on the Level of Assistance required for Functional Mobility and ADLs. Pain: 
Pre:0 Post:0 Pain Scale 1: Numeric (0 - 10) Activity Tolerance:  
Fair Please refer to the flowsheet for vital signs taken during this treatment. After treatment:  
[] Patient left in no apparent distress sitting up in chair 
[x] Patient left in no apparent distress in bed 
[x] Call bell left within reach [x] Nursing notified 
[] Caregiver present 
[] Bed alarm activated Caitlyn Schulte PTA Time Calculation: 35 mins

## 2018-08-04 NOTE — IP AVS SNAPSHOT
[address] Patient: Verito Lewis MRN: [id number] :1925 A check edilia indicates which time of day the medication should be taken. My Medications START taking these medications Instructions Each Dose to Equal  
 Morning Noon Evening Bedtime  
 polyethylene glycol 17 gram packet Commonly known as:  Seniaann Diana Your last dose was: Your next dose is: Take 1 Packet by mouth nightly. 17 g CONTINUE taking these medications Instructions Each Dose to Equal  
 Morning Noon Evening Bedtime  
 acetaminophen 325 mg tablet Commonly known as:  TYLENOL Your last dose was: Your next dose is: Take 2 Tabs by mouth every six (6) hours as needed. 650 mg  
    
   
   
   
  
 albuterol-ipratropium 2.5 mg-0.5 mg/3 ml Nebu Commonly known as:  Smiley Krabbe Your last dose was: Your next dose is:    
   
   
 3 mL by Nebulization route three (3) times daily as needed for Wheezing. 3 mL  
    
   
   
   
  
 aspirin 81 mg tablet Your last dose was: Your next dose is: Take 81 mg by mouth. 81 mg  
    
   
   
   
  
 atenolol 50 mg tablet Commonly known as:  TENORMIN Your last dose was: Your next dose is: Take 1 Tab by mouth every twelve (12) hours. 50 mg  
    
   
   
   
  
 docusate sodium 100 mg capsule Commonly known as:  Gevena Living Your last dose was: Your next dose is: Take 1 Cap by mouth two (2) times a day for 90 days. 100 mg  
    
   
   
   
  
 losartan-hydroCHLOROthiazide 100-25 mg per tablet Commonly known as:  HYZAAR Your last dose was: Your next dose is: Take 1 Tab by mouth daily. 1 Tab  
    
   
   
   
  
 pantoprazole 40 mg tablet Commonly known as:  PROTONIX Your last dose was: Your next dose is: Take 1 Tab by mouth Daily (before breakfast). 40 mg  
    
   
   
   
  
 predniSONE 10 mg tablet Commonly known as:  Pascual Joseph Your last dose was: Your next dose is: Take 0.5 Tabs by mouth daily. 5 mg  
    
   
   
   
  
 simvastatin 40 mg tablet Commonly known as:  ZOCOR Your last dose was: Your next dose is: Take  by mouth nightly. VITAMIN D2 50,000 unit capsule Generic drug:  ergocalciferol Your last dose was: Your next dose is: Take 50,000 Units by mouth. 55510 Units STOP taking these medications   
 cloNIDine HCl 0.2 mg tablet Commonly known as:  CATAPRES  
   
  
 hydrALAZINE 50 mg tablet Commonly known as:  APRESOLINE Where to Get Your Medications Information on where to get these meds will be given to you by the nurse or doctor. ! Ask your nurse or doctor about these medications  
  polyethylene glycol 17 gram packet

## 2018-08-04 NOTE — IP AVS SNAPSHOT
[address] Patient: Liliya Woodard MRN: [id number] :1925 About your hospitalization You were admitted on:  2018 You last received care in the:  [facility] [address] [place] [address] You were discharged on:  2018 Why you were hospitalized Your primary diagnosis was:  Not on File Follow-up Information Follow up With Details Comments Contact Info Irineo Baker MD   [address] [facility] [address] 
[phone] Discharge Orders None A check edilia indicates which time of day the medication should be taken. My Medications START taking these medications Instructions Each Dose to Equal  
 Morning Noon Evening Bedtime  
 polyethylene glycol 17 gram packet Commonly known as:  Henrey Mary Your last dose was: Your next dose is: Take 1 Packet by mouth nightly. 17 g CONTINUE taking these medications Instructions Each Dose to Equal  
 Morning Noon Evening Bedtime  
 acetaminophen 325 mg tablet Commonly known as:  TYLENOL Your last dose was: Your next dose is: Take 2 Tabs by mouth every six (6) hours as needed. 650 mg  
    
   
   
   
  
 albuterol-ipratropium 2.5 mg-0.5 mg/3 ml Nebu Commonly known as:  Can Dage Your last dose was: Your next dose is:    
   
   
 3 mL by Nebulization route three (3) times daily as needed for Wheezing. 3 mL  
    
   
   
   
  
 aspirin 81 mg tablet Your last dose was: Your next dose is: Take 81 mg by mouth. 81 mg  
    
   
   
   
  
 atenolol 50 mg tablet Commonly known as:  TENORMIN Your last dose was: Your next dose is: Take 1 Tab by mouth every twelve (12) hours.   
 50 mg  
    
   
   
   
  
 docusate sodium 100 mg capsule Commonly known as:  Nguyen Neighbor Your last dose was: Your next dose is: Take 1 Cap by mouth two (2) times a day for 90 days. 100 mg  
    
   
   
   
  
 losartan-hydroCHLOROthiazide 100-25 mg per tablet Commonly known as:  HYZAAR Your last dose was: Your next dose is: Take 1 Tab by mouth daily. 1 Tab  
    
   
   
   
  
 pantoprazole 40 mg tablet Commonly known as:  PROTONIX Your last dose was: Your next dose is: Take 1 Tab by mouth Daily (before breakfast). 40 mg  
    
   
   
   
  
 predniSONE 10 mg tablet Commonly known as:  Harmon Stair Your last dose was: Your next dose is: Take 0.5 Tabs by mouth daily. 5 mg  
    
   
   
   
  
 simvastatin 40 mg tablet Commonly known as:  ZOCOR Your last dose was: Your next dose is: Take  by mouth nightly. VITAMIN D2 50,000 unit capsule Generic drug:  ergocalciferol Your last dose was: Your next dose is: Take 50,000 Units by mouth. 86769 Units STOP taking these medications   
 cloNIDine HCl 0.2 mg tablet Commonly known as:  CATAPRES  
   
  
 hydrALAZINE 50 mg tablet Commonly known as:  APRESOLINE Where to Get Your Medications Information on where to get these meds will be given to you by the nurse or doctor. ! Ask your nurse or doctor about these medications  
  polyethylene glycol 17 gram packet Discharge Instructions None Introducing [facility]! Juaquin Reardon introduces [employer] patient portal. Now you can access parts of your medical record, email your doctor's office, and request medication refills online. 1. In your internet browser, go to [email or web address] 2. Click on the First Time User? Click Here link in the Sign In box. You will see the New Member Sign Up page. 3. Enter your inMarket Access Code exactly as it appears below. You will not need to use this code after youve completed the sign-up process. If you do not sign up before the expiration date, you must request a new code. · inMarket Access Code: [email or web address] Expires: 10/29/2018  7:53 PM 
 
4. Enter the last four digits of your Social Security Number (xxxx) and Date of Birth (mm/dd/yyyy) as indicated and click Submit. You will be taken to the next sign-up page. 5. Create a inMarket ID. This will be your inMarket login ID and cannot be changed, so think of one that is secure and easy to remember. 6. Create a inMarket password. You can change your password at any time. 7. Enter your Password Reset Question and Answer. This can be used at a later time if you forget your password. 8. Enter your e-mail address. You will receive e-mail notification when new information is available in [address] 9. Click Sign Up. You can now view and download portions of your medical record. 10. Click the Download Summary menu link to download a portable copy of your medical information. If you have questions, please visit the Frequently Asked Questions section of the inMarket website. Remember, inMarket is NOT to be used for urgent needs. For medical emergencies, dial 911. Now available from your iPhone and Android! Introducing Neri Tapia As a [facility] patient, I wanted to make you aware of our electronic visit tool called Neri Tapia. [facility] 24/7 allows you to connect within minutes with a medical provider 24 hours a day, seven days a week via a mobile device or tablet or logging into a secure website from your computer. You can access Neri Tapia from anywhere in the [place].  
 
A virtual visit might be right for you when you have a simple condition and feel like you just dont want to get out of bed, or cant get away from work for an appointment, when your regular [employer] provider is not available (evenings, weekends or holidays), or when youre out of town and need minor care. Electronic visits cost only $49 and if the [employer] 24/7 provider determines a prescription is needed to treat your condition, one can be electronically transmitted to a nearby pharmacy*. Please take a moment to enroll today if you have not already done so. The enrollment process is free and takes just a few minutes. To enroll, please download the [employer] 24/7 rick to your tablet or phone, or visit [email or web address] to enroll on your computer. And, as an [address] [place] [address] patient with a [employer] account, the results of your visits will be scanned into your electronic medical record and your primary care provider will be able to view the scanned results. We urge you to continue to see your regular [employer] provider for your ongoing medical care. And while your primary care provider may not be the one available when you seek a [employer] virtual visit, the peace of mind you get from getting a real diagnosis real time can be priceless. For more information on [employer], view our Frequently Asked Questions (FAQs) at [email or web address]. Sincerely, 
 
Yudy Lozano MD 
Chief Medical Officer Barrett Frost *:  certain medications cannot be prescribed via [employer] Providers Seen During Your Hospitalization Provider Specialty Primary office phone Ever Melchor MD Geriatric Medicine [phone] Your Primary Care Physician (PCP) Primary Care Physician Office Phone Office Fax Edwina Nice [phone] You are allergic to the following No active allergies Recent Documentation Weight Breastfeeding? BMI OB Status Smoking Status 56.1 kg No 20.57 kg/m2 Postmenopausal Former Smoker Emergency Contacts Name Discharge Info Relation Home Work Mobile Adama Guidry N/A  AT THIS TIME [6] Son [22] [id number] [address] CAREGIVER [3] Caregiver [13]   [phone] Patient Belongings The following personal items are in your possession at time of discharge: 
  Dental Appliances: Uppers, With patient  Visual Aid: None      Home Medications:  (stool softener, supository given to Ruth Holley)   Jewelry: Ring, Watch  Clothing: Undergarments    Other Valuables: Money (comment), Purse, Personal toiletries, With patient (checks, $737cash (700? in sealed envelope), change) Please provide this summary of care documentation to your next provider. Signatures-by signing, you are acknowledging that this After Visit Summary has been reviewed with you and you have received a copy. Patient Signature:  ____________________________________________________________ Date:  ____________________________________________________________  
  
Leisa Sauceda Provider Signature:  ____________________________________________________________ Date:  ____________________________________________________________

## 2018-08-04 NOTE — PROGRESS NOTES
Assumed patient care from [place], [place]. Patient is asleep at this time. No s/s of pain/ discomfort noted. Bed is locked and in lowest position and call bell is within reach. Not in acute distress.

## 2018-08-04 NOTE — IP AVS SNAPSHOT
Summary of Care Report The Summary of Care report has been created to help improve care coordination. Users with access to [employer] or [address] (Web-based application) may access additional patient information including the Discharge Summary. If you are not currently a [address] user and need more information, please call the number listed below in the [place] [address] section and ask to be connected with Medical Records. Facility Information Name Address Phone [facility] [address] [facility] [address] [address] [phone] Patient Information Patient Name Sex  Edwar Soliz ([id number]) Female 1925 Discharge Information Admitting Provider Service Area Unit Socorro Bender MD / [address] Transitional  / [phone] Discharge Provider Discharge Date/Time Discharge Disposition Destination (none) 2018 (Pending) AHR (none) Patient Language Language ENGLISH [13] Hospital Problems as of 2018  Reviewed: 3/6/2015 11:10 AM by Socorro Bender MD  
 None Non-Hospital Problems as of 2018  Reviewed: 3/6/2015 11:10 AM by Socorro Bender MD  
  
  
  
 Class Noted - Resolved Last Modified Active Problems   Polymyalgia rheumatica (HCC) (Chronic)  2015 - Present 2015 by Valentin Yeager MD  
  Entered by Valentin Yeager MD  
  Altered mental status  3/2/2015 - Present 3/2/2015 by Socorro Bender MD  
  Entered by Socorro Bender MD  
  CAD (coronary artery disease)  3/2/2015 - Present 3/2/2015 by Socorro Bender MD  
  Entered by Socorro Bender MD  
  Hypomagnesemia  2018 - Present 2018 by Darell Hunt MD  
  Entered by Darell Hunt MD  
  Syncope  2018 - Present 2018 by Socorro Bender MD  
  Entered by Socorro Bender MD  
 Hypokalemia  8/1/2018 - Present 8/1/2018 by Sara Haq MD  
  Entered by Sara Haq MD  
  HTN (hypertension)  8/1/2018 - Present 8/1/2018 by Sara Haq MD  
  Entered by Sara Haq MD  
  Knee pain, left  8/1/2018 - Present 8/1/2018 by Sara Haq MD  
  Entered by Sara Haq MD  
  Weakness  8/1/2018 - Present 8/1/2018 by Sara Haq MD  
  Entered by Sara Haq MD  
  
You are allergic to the following No active allergies Current Discharge Medication List  
  
START taking these medications Dose & Instructions Dispensing Information Comments  
 polyethylene glycol 17 gram packet Commonly known as:  Naz Clos Dose:  17 g Take 1 Packet by mouth nightly. Quantity:  12 Packet Refills:  0 CONTINUE these medications which have NOT CHANGED Dose & Instructions Dispensing Information Comments  
 acetaminophen 325 mg tablet Commonly known as:  TYLENOL Dose:  650 mg Take 2 Tabs by mouth every six (6) hours as needed. Quantity:  60 Tab Refills:  0  
   
 albuterol-ipratropium 2.5 mg-0.5 mg/3 ml Nebu Commonly known as:  Angel Apley Dose:  3 mL  
3 mL by Nebulization route three (3) times daily as needed for Wheezing. Quantity:  1 Package Refills:  1  
   
 aspirin 81 mg tablet Dose:  81 mg Take 81 mg by mouth. Refills:  0  
   
 atenolol 50 mg tablet Commonly known as:  TENORMIN Dose:  50 mg Take 1 Tab by mouth every twelve (12) hours. Quantity:  60 Tab Refills:  0  
   
 docusate sodium 100 mg capsule Commonly known as:  Josiah Pata Dose:  100 mg Take 1 Cap by mouth two (2) times a day for 90 days. Quantity:  60 Cap Refills:  2  
   
 losartan-hydroCHLOROthiazide 100-25 mg per tablet Commonly known as:  HYZAAR Dose:  1 Tab Take 1 Tab by mouth daily. Refills:  0  
   
 pantoprazole 40 mg tablet Commonly known as:  PROTONIX  Dose:  40 mg  
 Take 1 Tab by mouth Daily (before breakfast). Quantity:  30 Tab Refills:  0  
   
 predniSONE 10 mg tablet Commonly known as:  Faviola Mons Dose:  5 mg Take 0.5 Tabs by mouth daily. Quantity:  14 Tab Refills:  1  
 - 10 mg BID X 3days,then 10 mg daily X 3 days - Then 5 mg daily till further orders. simvastatin 40 mg tablet Commonly known as:  ZOCOR Take  by mouth nightly. Refills:  0  
   
 VITAMIN D2 50,000 unit capsule Generic drug:  ergocalciferol Dose:  49037 Units Take 50,000 Units by mouth. Refills:  0 STOP taking these medications Comments  
 cloNIDine HCl 0.2 mg tablet Commonly known as:  CATAPRES  
   
   
 hydrALAZINE 50 mg tablet Commonly known as:  APRESOLINE Follow-up Information Follow up With Details Comments Contact Info Victor Hugo Haque MD   [facility] [address] [facility] [place] [facility] [address] 
[phone] Discharge Instructions None Chart Review Routing History Recipient Method Report Sent By Tammy Hunter MD  
Phone: [phone] In Basket IP Auto Routed [employer] [[id number]] 2/15/2013  2:34 PM 02/15/2013 Amina Hunter MD  
Phone: [phone] In [employer] Routed Fernie Hoyos MD [[id number]] 2/20/2013  2:32 PM 02/20/2013 Victor Hugo Haque MD  
Fax: [phone] Phone: [phone] Fax IP Auto Routed Fernie Hoyos MD [[id number]] 2/20/2013  2:32 PM 02/20/2013 Victor Hugo Haque MD  
Fax: [phone] Phone: [phone] Fax IP Auto Routed Fernie Hoyos MD [[id number]] 3/5/2013  9:50 AM 03/05/2013 Victor Hugo Haque MD  
Fax: [phone] Phone: [phone] Fax IP Auto Routed Fernie Hoyos MD [[id number]] 3/5/2013  9:50 AM 03/05/2013 Victor Hugo Haque MD  
Fax: [phone] Phone: [phone] Fax IP Auto Routed [employer], MD [[id number]] 2/28/2015  9:26 AM 02/28/2015  Victor Hugo Haque MD  
 Fax: [phone] Phone: [phone] Fax IP Auto Routed [employer] Hoyos MD [[id number]] 3/7/2015 12:12 PM 03/07/2015

## 2018-08-04 NOTE — PROGRESS NOTES
General Internal Medicine/Geriatrics Patient: Janis Bettencorut MRN: [id number]  CSN: [id number] YOB: 1925  Age: 80 y.o. Sex: female DOA: 7/31/2018 LOS:  LOS: 2 days Subjective:  
 
Feels  better Knee Pain less Alert Working with PT Review of Systems: 
Eyes: negative Ears, Nose, Mouth, Throat, and Face: negative Respiratory: negative for dyspnea on exertion Cardiovascular: negative for chest pain Gastrointestinal: negative for nausea, vomiting and diarrhea Genitourinary:negative for dysuria and hematuria Integument/Breast: negative Hematologic/Lymphatic: negative for bleeding Musculoskeletal:negative for arthralgias Neurological: negative for weakness Behavioral/Psychiatric: negative for behavior problems Endocrine: negative for temperature intolerance Allergic/Immunologic: negative for hay fever Objective:  
 
Physical Exam: 
Patient Vitals for the past 24 hrs: 
 Temp Pulse Resp BP SpO2  
08/03/18 1714 98.4 °F (36.9 °C) 75 18 163/81 98 % 08/03/18 1112 98.6 °F (37 °C) 62 18 172/64 98 % 08/03/18 0835 97.5 °F (36.4 °C) (!) 59 18 172/56 96 % 08/03/18 0400 97.5 °F (36.4 °C) 60 18 181/79 96 % 08/02/18 2332 97.6 °F (36.4 °C) (!) 53 18 153/74 96 % 08/02/18 2159 - - - 148/70 -  
 
AF, VSS HEENT: wnl NECK:  No venous distention or adenopathy HEART: RRR, no rubs or gallops LUNGS: Clear to auscultation ABDOMEN: soft with active BS. No tenderness, no distention, no organomegaly EXT: warm,no edema, Left Knee DJD SKIN: Normal turgor, no breaks NEURO: Awake, alert, non focal 
 
Intake and Output: 
Current Shift:    
Last three shifts:  08/02 0701 - 08/03 1900 In: 660 [P.O.:660] Out: 300 [Urine:300] No results found for this or any previous visit (from the past 24 hour(s)). No results found. Current Facility-Administered Medications Medication Dose Route Frequency  methylPREDNISolone (PF) (SOLU-MEDROL) injection 30 mg  30 mg IntraVENous Q8H  
 docusate sodium (COLACE) capsule 100 mg  100 mg Oral BID  acetaminophen (TYLENOL) tablet 650 mg  650 mg Oral Q6H PRN  
 albuterol-ipratropium (DUO-NEB) 2.5 MG-0.5 MG/3 ML  3 mL Nebulization TID PRN  
 aspirin delayed-release tablet 81 mg  81 mg Oral DAILY  atenolol (TENORMIN) tablet 50 mg  50 mg Oral Q12H  cloNIDine HCl (CATAPRES) tablet 0.2 mg  0.2 mg Oral Q8H  
 ergocalciferol (ERGOCALCIFEROL) capsule 50,000 Units  50,000 Units Oral Q7D  
 hydrALAZINE (APRESOLINE) tablet 50 mg  50 mg Oral TID  losartan (COZAAR) tablet 100 mg  100 mg Oral DAILY  pantoprazole (PROTONIX) tablet 40 mg  40 mg Oral ACB  simvastatin (ZOCOR) tablet 40 mg  40 mg Oral QHS  hydroCHLOROthiazide (HYDRODIURIL) tablet 25 mg  25 mg Oral DAILY Lab Results Component Value Date/Time Glucose 94 08/01/2018 06:50 AM  
 Glucose 95 07/31/2018 08:12 PM  
 Glucose 98 07/10/2015 04:18 PM  
 Glucose 82 03/05/2015 08:46 AM  
 Glucose 97 03/04/2015 07:18 AM  
  
 
Assessment Active Problems: Hypomagnesemia (7/31/2018) Syncope (8/1/2018) Hypokalemia (8/1/2018) HTN (hypertension) (8/1/2018) Knee pain, left (8/1/2018) Weakness (8/1/2018) Plan Same meds 
decrease steroids further PT 
SNF Jewell Narayanan MD 
8/3/2018,

## 2018-08-04 NOTE — PROGRESS NOTES
2000-no signs of distress. Ordered meds given throughout night. Patient had high blood pressure during the night. Notified MD. No new orders given. Continued to monitor. Bedside shift change report given to PERICO Garcia (oncoming nurse) by Shannen Iniguez (offgoing nurse). Report included the following information SBAR.

## 2018-08-04 NOTE — PROGRESS NOTES
Problem: Pressure Injury - Risk of 
Goal: *Prevention of pressure injury Document Josiah Scale and appropriate interventions in the flowsheet. Outcome: Progressing Towards Goal 
Pressure Injury Interventions: 
  
 
Moisture Interventions: Absorbent underpads Activity Interventions: Increase time out of bed Mobility Interventions: HOB 30 degrees or less Nutrition Interventions: Document food/fluid/supplement intake Friction and Shear Interventions: HOB 30 degrees or less Problem: Falls - Risk of 
Goal: *Absence of Falls Document Darnell Bentley Fall Risk and appropriate interventions in the flowsheet. Outcome: Progressing Towards Goal 
Fall Risk Interventions: 
Mobility Interventions: Patient to call before getting OOB Medication Interventions: Patient to call before getting OOB Elimination Interventions: Call light in reach History of Falls Interventions: Evaluate medications/consider consulting pharmacy

## 2018-08-04 NOTE — DISCHARGE SUMMARY
[address]    Isaac Griffith  MR#: [id number]  : 1925  ACCOUNT #: [de-identified]   ADMIT DATE: 2018  DISCHARGE DATE:     DISCHARGE DIAGNOSES:  1. Syncopal episode, undetermined etiology. 2.  Fall with minor injuries -- improving. 3.  Generalized deconditioning. 4.  Polymyalgia rheumatica. 5.  Hypomagnesemia, hypokalemia -- corrected. 6.  Severe osteoarthritis, left knee -- declines steroid injection, etc.  7.  Hypertension. 8.  Hypercholesterolemia. 9.  Coronary artery disease. 10.  As per past medical history. DISCHARGE MEDICATIONS:  1. Tylenol 650 every 6 hours as needed. 2.  DuoNeb unit dose 3 times daily as needed. 3.  Aspirin 81 mg a day. 4.  Atenolol 50 mg every 12 hours. 5.  Clonidine 0.2 mg every 8 hours. 6.  Colace 2 times daily. 7.  Hydralazine 50 mg 3 times daily. 8.  Hyzaar 100/25 daily. 9.  Protonix 40 mg daily. 10.  Prednisone 5 mg daily. 11.  Vitamin D 50,000 units weekly. 12.  Zocor 40 mg daily. DISPOSITION:  To skilled nursing facility for continued monitoring and physical rehabilitation. REASON FOR ADMISSION:  Very pleasant 80-year-old female with the above problems admitted after prolonged syncopal episode, weakness, inability to ambulate. For details, see admission history and physical.    The patient was evaluated in the emergency department. She had CT head and spine which showed no evidence of acute injury. 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 The patient's potassium and magnesium were corrected. She received empiric IV steroids. She declined intra-articular injection of steroids. She received physical therapy and ambulated satisfactorily but felt to need further skilled rehabilitation. On the monitor, she had no significant arrhythmia.     PLAN:  Further workup will be done as an outpatient including echo and carotid PVLs, etc.  Patient at this time felt to have achieved the maximum benefits of hospital stay and will be discharged for continued care as above. MD TIFFANIE Singh/  D: 08/04/2018 16:19     T: 08/04/2018 16:50  JOB #: [id number]

## 2018-08-04 NOTE — DISCHARGE INSTRUCTIONS
Fainting: Care Instructions  Your Care Instructions    When you faint, or pass out, you lose consciousness for a short time. A brief drop in blood flow to the brain often causes it. When you fall or lie down, more blood flows to your brain and you regain consciousness. Emotional stress, pain, or overheating-especially if you have been standing-can make you faint. In these cases, fainting is usually not serious. But fainting can be a sign of a more serious problem. Your doctor may want you to have more tests to rule out other causes. The treatment you need depends on the reason why you fainted. The doctor has checked you carefully, but problems can develop later. If you notice any problems or new symptoms, get medical treatment right away. Follow-up care is a key part of your treatment and safety. Be sure to make and go to all appointments, and call your doctor if you are having problems. It's also a good idea to know your test results and keep a list of the medicines you take. How can you care for yourself at home? · Drink plenty of fluids to prevent dehydration. If you have kidney, heart, or liver disease and have to limit fluids, talk with your doctor before you increase your fluid intake. When should you call for help? Call 911 anytime you think you may need emergency care. For example, call if:    · You have symptoms of a heart problem. These may include:  ¨ Chest pain or pressure. ¨ Severe trouble breathing. ¨ A fast or irregular heartbeat. ¨ Lightheadedness or sudden weakness. ¨ Coughing up pink, foamy mucus. ¨ Passing out. After you call 911, the  may tell you to chew 1 adult-strength or 2 to 4 low-dose aspirin. Wait for an ambulance. Do not try to drive yourself.     · You have symptoms of a stroke. These may include:  ¨ Sudden numbness, tingling, weakness, or loss of movement in your face, arm, or leg, especially on only one side of your body. ¨ Sudden vision changes.   ¨ Sudden trouble speaking. ¨ Sudden confusion or trouble understanding simple statements. ¨ Sudden problems with walking or balance. ¨ A sudden, severe headache that is different from past headaches.     · You passed out (lost consciousness) again.    Watch closely for changes in your health, and be sure to contact your doctor if:    · You do not get better as expected. Where can you learn more? Go to http://abena-tameka.info/. Enter U683 in the search box to learn more about \"Fainting: Care Instructions. \"  Current as of: November 20, 2017  Content Version: 11.7  © 1136-9657 [employer]. Care instructions adapted under license by [employer] (which disclaims liability or warranty for this information). If you have questions about a medical condition or this instruction, always ask your healthcare professional. [address] any warranty or liability for your use of this information. High Blood Pressure: Care Instructions  Your Care Instructions    If your blood pressure is usually above 130/80, you have high blood pressure, or hypertension. That means the top number is 130 or higher or the bottom number is 80 or higher, or both. Despite what a lot of people think, high blood pressure usually doesn't cause headaches or make you feel dizzy or lightheaded. It usually has no symptoms. But it does increase your risk for heart attack, stroke, and kidney or eye damage. The higher your blood pressure, the more your risk increases. Your doctor will give you a goal for your blood pressure. Your goal will be based on your health and your age. Lifestyle changes, such as eating healthy and being active, are always important to help lower blood pressure. You might also take medicine to reach your blood pressure goal.  Follow-up care is a key part of your treatment and safety.  Be sure to make and go to all appointments, and call your doctor if you are having problems. It's also a good idea to know your test results and keep a list of the medicines you take. How can you care for yourself at home? Medical treatment  · If you stop taking your medicine, your blood pressure will go back up. You may take one or more types of medicine to lower your blood pressure. Be safe with medicines. Take your medicine exactly as prescribed. Call your doctor if you think you are having a problem with your medicine. · Talk to your doctor before you start taking aspirin every day. Aspirin can help certain people lower their risk of a heart attack or stroke. But taking aspirin isn't right for everyone, because it can cause serious bleeding. · See your doctor regularly. You may need to see the doctor more often at first or until your blood pressure comes down. · If you are taking blood pressure medicine, talk to your doctor before you take decongestants or anti-inflammatory medicine, such as ibuprofen. Some of these medicines can raise blood pressure. · Learn how to check your blood pressure at home. Lifestyle changes  · Stay at a healthy weight. This is especially important if you put on weight around the waist. Losing even 10 pounds can help you lower your blood pressure. · If your doctor recommends it, get more exercise. Walking is a good choice. Bit by bit, increase the amount you walk every day. Try for at least 30 minutes on most days of the week. You also may want to swim, bike, or do other activities. · Avoid or limit alcohol. Talk to your doctor about whether you can drink any alcohol. · Try to limit how much sodium you eat to less than 2,300 milligrams (mg) a day. Your doctor may ask you to try to eat less than 1,500 mg a day. · Eat plenty of fruits (such as bananas and oranges), vegetables, legumes, whole grains, and low-fat dairy products. · Lower the amount of saturated fat in your diet. Saturated fat is found in animal products such as milk, cheese, and meat.  Limiting these foods may help you lose weight and also lower your risk for heart disease. · Do not smoke. Smoking increases your risk for heart attack and stroke. If you need help quitting, talk to your doctor about stop-smoking programs and medicines. These can increase your chances of quitting for good. When should you call for help? Call 911 anytime you think you may need emergency care. This may mean having symptoms that suggest that your blood pressure is causing a serious heart or blood vessel problem. Your blood pressure may be over 180/110.   For example, call 911 if:    · You have symptoms of a heart attack. These may include:  ¨ Chest pain or pressure, or a strange feeling in the chest.  ¨ Sweating. ¨ Shortness of breath. ¨ Nausea or vomiting. ¨ Pain, pressure, or a strange feeling in the back, neck, jaw, or upper belly or in one or both shoulders or arms. ¨ Lightheadedness or sudden weakness. ¨ A fast or irregular heartbeat.     · You have symptoms of a stroke. These may include:  ¨ Sudden numbness, tingling, weakness, or loss of movement in your face, arm, or leg, especially on only one side of your body. ¨ Sudden vision changes. ¨ Sudden trouble speaking. ¨ Sudden confusion or trouble understanding simple statements. ¨ Sudden problems with walking or balance. ¨ A sudden, severe headache that is different from past headaches.     · You have severe back or belly pain.    Do not wait until your blood pressure comes down on its own. Get help right away.   Call your doctor now or seek immediate care if:    · Your blood pressure is much higher than normal (such as 180/110 or higher), but you don't have symptoms.     · You think high blood pressure is causing symptoms, such as:  ¨ Severe headache. ¨ Blurry vision.    Watch closely for changes in your health, and be sure to contact your doctor if:    · Your blood pressure measures 140/90 or higher at least 2 times.  That means the top number is 140 or higher or the bottom number is 90 or higher, or both.     · You think you may be having side effects from your blood pressure medicine.     · Your blood pressure is usually normal, but it goes above normal at least 2 times. Where can you learn more? Go to http://abena-tameka.info/. Enter H350 in the search box to learn more about \"High Blood Pressure: Care Instructions. \"  Current as of: December 6, 2017  Content Version: 11.7  © 4248-6079 [employer]. Care instructions adapted under license by [employer] (which disclaims liability or warranty for this information). If you have questions about a medical condition or this instruction, always ask your healthcare professional. [place] [address] any warranty or liability for your use of this information. DISCHARGE SUMMARY from Nurse    PATIENT INSTRUCTIONS:    After general anesthesia or intravenous sedation, for 24 hours or while taking prescription Narcotics:  · Limit your activities  · Do not drive and operate hazardous machinery  · Do not make important personal or business decisions  · Do  not drink alcoholic beverages  · If you have not urinated within 8 hours after discharge, please contact your surgeon on call.     Report the following to your surgeon:  · Excessive pain, swelling, redness or odor of or around the surgical area  · Temperature over 100.5  · Nausea and vomiting lasting longer than 4 hours or if unable to take medications  · Any signs of decreased circulation or nerve impairment to extremity: change in color, persistent  numbness, tingling, coldness or increase pain  · Any questions    What to do at Home:  Recommended activity: Activity as tolerated, or as physician advised    If you experience any of the following symptoms of a Stroke, Warning Signs of a Heart Attack  And When to Call for Help as listed under discharge teachings , please follow up with your primary care physician or call 911. *  Please give a list of your current medications to your Primary Care Provider. *  Please update this list whenever your medications are discontinued, doses are      changed, or new medications (including over-the-counter products) are added. *  Please carry medication information at all times in case of emergency situations. These are general instructions for a healthy lifestyle:    No smoking/ No tobacco products/ Avoid exposure to second hand smoke  Surgeon General's Warning:  Quitting smoking now greatly reduces serious risk to your health. Obesity, smoking, and sedentary lifestyle greatly increases your risk for illness    A healthy diet, regular physical exercise & weight monitoring are important for maintaining a healthy lifestyle    You may be retaining fluid if you have a history of heart failure or if you experience any of the following symptoms:  Weight gain of 3 pounds or more overnight or 5 pounds in a week, increased swelling in our hands or feet or shortness of breath while lying flat in bed. Please call your doctor as soon as you notice any of these symptoms; do not wait until your next office visit. Recognize signs and symptoms of STROKE:    F-face looks uneven    A-arms unable to move or move unevenly    S-speech slurred or non-existent    T-time-call 911 as soon as signs and symptoms begin-DO NOT go       Back to bed or wait to see if you get better-TIME IS BRAIN. Warning Signs of HEART ATTACK     Call 911 if you have these symptoms:   Chest discomfort. Most heart attacks involve discomfort in the center of the chest that lasts more than a few minutes, or that goes away and comes back. It can feel like uncomfortable pressure, squeezing, fullness, or pain.  Discomfort in other areas of the upper body. Symptoms can include pain or discomfort in one or both arms, the back, neck, jaw, or stomach.  Shortness of breath with or without chest discomfort.    Other signs may include breaking out in a cold sweat, nausea, or lightheadedness. Don't wait more than five minutes to call 911 - MINUTES MATTER! Fast action can save your life. Calling 911 is almost always the fastest way to get lifesaving treatment. Emergency Medical Services staff can begin treatment when they arrive -- up to an hour sooner than if someone gets to the hospital by car. The discharge information has been reviewed with the patient. The patient verbalized understanding. Discharge medications reviewed with the patient and appropriate educational materials and side effects teaching were provided.   ___________________________________________________________________________________________________________________________________

## 2018-08-04 NOTE — PROGRESS NOTES
Problem: Pressure Injury - Risk of 
Goal: *Prevention of pressure injury Document Josiah Scale and appropriate interventions in the flowsheet. Outcome: Progressing Towards Goal 
Pressure Injury Interventions: 
  
 
Moisture Interventions: Absorbent underpads, Internal/External urinary devices Activity Interventions: Pressure redistribution bed/mattress(bed type), PT/OT evaluation Mobility Interventions: HOB 30 degrees or less, Pressure redistribution bed/mattress (bed type), PT/OT evaluation Nutrition Interventions: Document food/fluid/supplement intake Friction and Shear Interventions: HOB 30 degrees or less Problem: Falls - Risk of 
Goal: *Absence of Falls Document Cheri Nunn Fall Risk and appropriate interventions in the flowsheet. Outcome: Progressing Towards Goal 
Fall Risk Interventions: 
Mobility Interventions: Patient to call before getting OOB, PT Consult for mobility concerns Medication Interventions: Patient to call before getting OOB Elimination Interventions: Call light in reach, Patient to call for help with toileting needs, Toileting schedule/hourly rounds History of Falls Interventions: Evaluate medications/consider consulting pharmacy

## 2018-08-05 LAB
ANION GAP SERPL CALC-SCNC: 10 MMOL/L (ref 3–18)
APPEARANCE UR: CLEAR
BACTERIA URNS QL MICRO: ABNORMAL /HPF
BASOPHILS # BLD: 0 K/UL (ref 0–0.1)
BASOPHILS NFR BLD: 0 % (ref 0–2)
BILIRUB UR QL: NEGATIVE
BUN SERPL-MCNC: 29 MG/DL (ref 7–18)
BUN/CREAT SERPL: 33 (ref 12–20)
CALCIUM SERPL-MCNC: 9.9 MG/DL (ref 8.5–10.1)
CHLORIDE SERPL-SCNC: 93 MMOL/L (ref 100–108)
CO2 SERPL-SCNC: 30 MMOL/L (ref 21–32)
COLOR UR: YELLOW
CREAT SERPL-MCNC: 0.89 MG/DL (ref 0.6–1.3)
DIFFERENTIAL METHOD BLD: ABNORMAL
EOSINOPHIL # BLD: 0.1 K/UL (ref 0–0.4)
EOSINOPHIL NFR BLD: 1 % (ref 0–5)
EPITH CASTS URNS QL MICRO: ABNORMAL /LPF (ref 0–5)
ERYTHROCYTE [DISTWIDTH] IN BLOOD BY AUTOMATED COUNT: 13.3 % (ref 11.6–14.5)
GLUCOSE SERPL-MCNC: 130 MG/DL (ref 74–99)
GLUCOSE UR STRIP.AUTO-MCNC: NEGATIVE MG/DL
HCT VFR BLD AUTO: 34.9 % (ref 35–45)
HGB BLD-MCNC: 11.7 G/DL (ref 12–16)
HGB UR QL STRIP: NEGATIVE
KETONES UR QL STRIP.AUTO: NEGATIVE MG/DL
LEUKOCYTE ESTERASE UR QL STRIP.AUTO: NEGATIVE
LYMPHOCYTES # BLD: 3.1 K/UL (ref 0.9–3.6)
LYMPHOCYTES NFR BLD: 26 % (ref 21–52)
MCH RBC QN AUTO: 28.2 PG (ref 24–34)
MCHC RBC AUTO-ENTMCNC: 33.5 G/DL (ref 31–37)
MCV RBC AUTO: 84.1 FL (ref 74–97)
MONOCYTES # BLD: 1 K/UL (ref 0.05–1.2)
MONOCYTES NFR BLD: 9 % (ref 3–10)
NEUTS SEG # BLD: 7.6 K/UL (ref 1.8–8)
NEUTS SEG NFR BLD: 64 % (ref 40–73)
NITRITE UR QL STRIP.AUTO: NEGATIVE
PH UR STRIP: 5 [PH] (ref 5–8)
PLATELET # BLD AUTO: 354 K/UL (ref 135–420)
PMV BLD AUTO: 10.7 FL (ref 9.2–11.8)
POTASSIUM SERPL-SCNC: 3.6 MMOL/L (ref 3.5–5.5)
PROT UR STRIP-MCNC: NEGATIVE MG/DL
RBC # BLD AUTO: 4.15 M/UL (ref 4.2–5.3)
RBC #/AREA URNS HPF: ABNORMAL /HPF (ref 0–5)
SODIUM SERPL-SCNC: 133 MMOL/L (ref 136–145)
SP GR UR REFRACTOMETRY: 1.01 (ref 1–1.03)
UROBILINOGEN UR QL STRIP.AUTO: 0.2 EU/DL (ref 0.2–1)
WBC # BLD AUTO: 11.8 K/UL (ref 4.6–13.2)
WBC URNS QL MICRO: ABNORMAL /HPF (ref 0–4)

## 2018-08-05 PROCEDURE — 81001 URINALYSIS AUTO W/SCOPE: CPT | Performed by: INTERNAL MEDICINE

## 2018-08-05 PROCEDURE — 74011250637 HC RX REV CODE- 250/637: Performed by: INTERNAL MEDICINE

## 2018-08-05 PROCEDURE — 87186 SC STD MICRODIL/AGAR DIL: CPT | Performed by: INTERNAL MEDICINE

## 2018-08-05 PROCEDURE — 36415 COLL VENOUS BLD VENIPUNCTURE: CPT | Performed by: INTERNAL MEDICINE

## 2018-08-05 PROCEDURE — 74011636637 HC RX REV CODE- 636/637: Performed by: INTERNAL MEDICINE

## 2018-08-05 PROCEDURE — 87086 URINE CULTURE/COLONY COUNT: CPT | Performed by: INTERNAL MEDICINE

## 2018-08-05 PROCEDURE — 87077 CULTURE AEROBIC IDENTIFY: CPT | Performed by: INTERNAL MEDICINE

## 2018-08-05 PROCEDURE — 85025 COMPLETE CBC W/AUTO DIFF WBC: CPT | Performed by: INTERNAL MEDICINE

## 2018-08-05 PROCEDURE — 80048 BASIC METABOLIC PNL TOTAL CA: CPT | Performed by: INTERNAL MEDICINE

## 2018-08-05 RX ORDER — HYDROCHLOROTHIAZIDE 25 MG/1
25 TABLET ORAL DAILY
Status: DISCONTINUED | OUTPATIENT
Start: 2018-08-05 | End: 2018-08-21 | Stop reason: HOSPADM

## 2018-08-05 RX ORDER — HALOPERIDOL 2 MG/1
2 TABLET ORAL
Status: DISCONTINUED | OUTPATIENT
Start: 2018-08-05 | End: 2018-08-21 | Stop reason: HOSPADM

## 2018-08-05 RX ORDER — LOSARTAN POTASSIUM 50 MG/1
100 TABLET ORAL DAILY
Status: DISCONTINUED | OUTPATIENT
Start: 2018-08-05 | End: 2018-08-21 | Stop reason: HOSPADM

## 2018-08-05 RX ADMIN — ATENOLOL 50 MG: 50 TABLET ORAL at 12:53

## 2018-08-05 RX ADMIN — HYDROCHLOROTHIAZIDE 25 MG: 25 TABLET ORAL at 12:52

## 2018-08-05 RX ADMIN — LOSARTAN POTASSIUM 100 MG: 50 TABLET ORAL at 12:53

## 2018-08-05 RX ADMIN — DOCUSATE SODIUM 100 MG: 100 CAPSULE, LIQUID FILLED ORAL at 18:40

## 2018-08-05 RX ADMIN — SIMVASTATIN 40 MG: 40 TABLET, FILM COATED ORAL at 21:53

## 2018-08-05 RX ADMIN — ATENOLOL 50 MG: 50 TABLET ORAL at 21:53

## 2018-08-05 RX ADMIN — CLONIDINE HYDROCHLORIDE 0.2 MG: 0.1 TABLET ORAL at 06:27

## 2018-08-05 RX ADMIN — CLONIDINE HYDROCHLORIDE 0.2 MG: 0.1 TABLET ORAL at 21:53

## 2018-08-05 RX ADMIN — HALOPERIDOL 2 MG: 2 TABLET ORAL at 13:00

## 2018-08-05 RX ADMIN — ERGOCALCIFEROL 50000 UNITS: 1.25 CAPSULE ORAL at 12:52

## 2018-08-05 RX ADMIN — ASPIRIN 81 MG 81 MG: 81 TABLET ORAL at 12:53

## 2018-08-05 RX ADMIN — HYDRALAZINE HYDROCHLORIDE 50 MG: 50 TABLET, FILM COATED ORAL at 21:53

## 2018-08-05 RX ADMIN — PREDNISONE 5 MG: 5 TABLET ORAL at 12:51

## 2018-08-05 RX ADMIN — PANTOPRAZOLE SODIUM 40 MG: 40 TABLET, DELAYED RELEASE ORAL at 12:52

## 2018-08-05 RX ADMIN — HYDRALAZINE HYDROCHLORIDE 50 MG: 50 TABLET, FILM COATED ORAL at 12:53

## 2018-08-05 RX ADMIN — DOCUSATE SODIUM 100 MG: 100 CAPSULE, LIQUID FILLED ORAL at 12:51

## 2018-08-05 NOTE — ROUTINE PROCESS
Patient is ordered  for transferred  to [facility]. Report given to PERICO Gan. Report included KARDEX, MAR, LABS, and  , I&O.

## 2018-08-05 NOTE — ROUTINE PROCESS
Patient agitated, confused about why she is here, thought she could go home to her house. Patient wants MD called. Dr. Jamilah Edmonds paged. Patient given explanation of plan of care. Patient calmed down after spoken to about who to call and family in [facility]. Patient reassured we would call or assist with calling persons she needed to reach. Attempted to call son in [facility] for patient, phone number in correct.  Patient assisted back to room for breakfast and a nap

## 2018-08-05 NOTE — ROUTINE PROCESS
Bedside and Verbal shift change report given to Johnny Sanders LPN (oncoming nurse) by Winifred Fernandez RN (offgoing nurse). Report included the following information SBAR, Kardex and MAR.

## 2018-08-05 NOTE — ROUTINE PROCESS
Patient is alert & oriented x3. She is able to make her needs known. Skin warm & dry to touch. No open areas noted but she does have multiple bruises on both arms, bruise to area over (L) eyebrow, & bruise to lower back. (L) & (R) heel are red but blanching. Heels are elevated on pillows. Patient ambulated to bathroom with assist x 1 when she arrived on the unit.

## 2018-08-06 PROCEDURE — 74011636637 HC RX REV CODE- 636/637: Performed by: INTERNAL MEDICINE

## 2018-08-06 PROCEDURE — 74011250637 HC RX REV CODE- 250/637: Performed by: INTERNAL MEDICINE

## 2018-08-06 RX ORDER — GLYCERIN ADULT
1 SUPPOSITORY, RECTAL RECTAL
Status: DISCONTINUED | OUTPATIENT
Start: 2018-08-06 | End: 2018-08-21 | Stop reason: HOSPADM

## 2018-08-06 RX ADMIN — CLONIDINE HYDROCHLORIDE 0.2 MG: 0.1 TABLET ORAL at 05:38

## 2018-08-06 RX ADMIN — ATENOLOL 50 MG: 50 TABLET ORAL at 21:55

## 2018-08-06 RX ADMIN — PANTOPRAZOLE SODIUM 40 MG: 40 TABLET, DELAYED RELEASE ORAL at 09:13

## 2018-08-06 RX ADMIN — SIMVASTATIN 40 MG: 40 TABLET, FILM COATED ORAL at 21:55

## 2018-08-06 RX ADMIN — HYDRALAZINE HYDROCHLORIDE 50 MG: 50 TABLET, FILM COATED ORAL at 21:55

## 2018-08-06 RX ADMIN — HYDRALAZINE HYDROCHLORIDE 50 MG: 50 TABLET, FILM COATED ORAL at 17:12

## 2018-08-06 RX ADMIN — PREDNISONE 5 MG: 5 TABLET ORAL at 09:13

## 2018-08-06 RX ADMIN — ATENOLOL 50 MG: 50 TABLET ORAL at 09:13

## 2018-08-06 RX ADMIN — DOCUSATE SODIUM 100 MG: 100 CAPSULE, LIQUID FILLED ORAL at 09:12

## 2018-08-06 RX ADMIN — ASPIRIN 81 MG 81 MG: 81 TABLET ORAL at 09:13

## 2018-08-06 RX ADMIN — ACETAMINOPHEN 650 MG: 325 TABLET, FILM COATED ORAL at 09:12

## 2018-08-06 RX ADMIN — GLYCERIN 1 SUPPOSITORY: 2.1 SUPPOSITORY RECTAL at 21:56

## 2018-08-06 RX ADMIN — LOSARTAN POTASSIUM 100 MG: 50 TABLET ORAL at 09:12

## 2018-08-06 RX ADMIN — DOCUSATE SODIUM 100 MG: 100 CAPSULE, LIQUID FILLED ORAL at 17:12

## 2018-08-06 RX ADMIN — HYDROCHLOROTHIAZIDE 25 MG: 25 TABLET ORAL at 09:13

## 2018-08-06 RX ADMIN — CLONIDINE HYDROCHLORIDE 0.2 MG: 0.1 TABLET ORAL at 21:55

## 2018-08-06 RX ADMIN — HYDRALAZINE HYDROCHLORIDE 50 MG: 50 TABLET, FILM COATED ORAL at 09:13

## 2018-08-06 NOTE — PROGRESS NOTES
Problem: Mobility Impaired (Adult and Pediatric)  Goal: *Acute Goals and Plan of Care (Insert Text)  PHYSICAL THERAPY STG GOALS :  Initiated 8/6/2018 and to be accomplished within 2 Weeks    1. Patient will move from supine to sit and sit to supine  and roll side to side in bed with supervision/set-up. 2.  Patient will transfer from bed to chair and chair to bed with close supervision/set-up using RW. 3.  Patient will perform sit to stand with close supervision/set-up with Good balance and safety awareness. 4.  Patient will ambulate with stand by assistance for 100 feet with RW on level surfaces with 2 turns. 5.  Patient will ascend/descend 5 stairs with left handrail(s) with contact guard assistance to allow for safe home access/exit. 6.  Patient will improve standardized test score for [place] Standing Balance Scale 2+ to reduce fall risk. PHYSICAL THERAPY LTG GOALS :  Initiated 8/6/2018 and to be accomplished within 4 Weeks    1. Patient will move from supine to sit and sit to supine  and roll side to side in bed with modified independence. 2.  Patient will transfer from bed to chair and chair to bed with modified independence using RW/rollator. 3.  Patient will perform sit to stand with modified independence with Good balance and safety awareness. 4.  Patient will ambulate with supervision/set-up for 200 feet with RW/rollator on level surfaces and be able to maneuver through narrow spaces and obstacles without loss of balance. 5.  Patient will ascend/descend 5 stairs with left handrail(s) with supervision/set-up to allow for safe home access/exit. 6.  Patient will improve standardized test score for [place] Standing Balance Scale 3 to reduce fall risk.       Physical Therapist:   Marti Rangel, PT  on 8/6/2018           [address]  [facility]   physical Therapy EVALUATION    Patient: Daija Jaime (89 y.o. female)                Start of Care Date: 8/6/2018   Referred by : Vamshi Manzano Kieran Jerry MD                 Precautions: Fall     History:  Patient was admitted to [facility] on 7/31/18 after sustaining two falls at home. Diagnostics revealed hypomagnesemia and syncope. Upon acute d/c, she was unsafe to return to home and was transferred to [facility]. History of present problem reveals HIGH Complexity :3+ comorbidities / personal factors will impact the outcome/ POC . Past Medical history includes:   Past Medical History:   Diagnosis Date    Arthritis     CAD (coronary artery disease) 2001    3 coronary stents    Cardiac complication     Hypertension     Migraine 3/1/2015    Polymyalgia rheumatica ([facility] [address] 2/28/2015     Past Surgical History:   Procedure Laterality Date    CARDIAC SURG PROCEDURE UNLIST      3 stents        Cognitive Status:  Mental Status  Neurologic State: Alert  Orientation Level: Oriented X4  Cognition: Follows commands  Perception: Appears intact  Perseveration: No perseveration noted  Safety/Judgement: Fall prevention  Barriers to Learning/Limitations: yes;  sensory deficits-vision/hearing/speech  Compensate with: visual, verbal, tactile, kinesthetic cues/model  Prior Level of Function/Home Situation and Assitance recieved:  Home Situation  Home Environment: Private residence  # Steps to Enter: 5  Rails to Enter: Yes  Hand Rails : Left  One/Two Story Residence: One story  Living Alone: Yes  Support Systems: Friends \ neighbors, Home care staff (hired caregiver 1-2 days/week)  Patient Expects to be Discharged to[de-identified] Private residence  Current DME Used/Available at Home: Cane, straight, Walker, rolling  Level of Assistance received at Home:   Prior to this current hospitalization, the patient required use SPC in home, also has RW. Reportedly, she is mod (I) at home, but has hired caregiver to help with chores 1-2 days/week \"or whenever I need her. \"  Justification for Evaluation complexity:  Patient is referred to Skilled Physical Therapy services due a functional decline in strength, endurance, mobility, gait, balance and required an overall HIGH  complexity evaluation examination. Eval Complexity: History: HIGH Complexity :3+ comorbidities / personal factors will impact the outcome/ POC Exam:HIGH Complexity : 4+ Standardized tests and measures addressing body structure, function, activity limitation and / or participation in recreation  Presentation: LOW Complexity : Stable, uncomplicated    OBJECTIVE DATA SUMMARY:     Vital Signs:  Resting BP:  BP: 130/58  HR: Pulse (Heart Rate): 62    Pain:  Pain Screen  Pain Scale 1: Numeric (0 - 10)  Pain Intensity 1: 0  Patient Stated Pain Goal: 0  Gross Assessment:  Gross Assessment  AROM: Generally decreased, functional  PROM: Generally decreased, functional  Strength: Generally decreased, functional (RLE grossly 3+/5, LLE demonstrates 3/5)  Coordination: Generally decreased, functional  Tone: Normal   Bed Mobility:  Bed Mobility  Rolling: Contact guard assistance  Supine to Sit: Contact guard assistance  Scooting: Contact guard assistance  Balance:  Balance  Sitting: Without support  Sitting - Static: Good (unsupported)  Sitting - Dynamic: Fair (occasional) (+)  Standing: With support  Standing - Static: Fair (+)  Standing - Dynamic : Fair  Transfers:  Sit to Stand: Contact guard assistance  Gait:  Gait  Base of Support: Center of gravity altered  Speed/Tisha: Slow  Step Length: Left shortened;Right shortened  Gait Abnormalities: Decreased step clearance  Ambulation - Level of Assistance: Contact guard assistance  Distance (ft): 18 Feet (ft) (12)  Assistive Device: Gait belt;Walker, rolling  Gait Description (WDL): Exceptions to WDL        With 2 turns.   Wheelchair Management:    N/A  Assessment:   Clinical Decision Making:High Complexity , using standardized patient assessment instrument and /or measurable assessement of functional outcome of [facility] Standing Balance Scale  0: Pt performs 25% or less of standing activity (Max assist) CN, 100% impaired. 1: Pt supports self with upper extremities but requires therapist assistance. Pt performs 25-50% of effort (Mod assist) CM, 80% to <100% impaired. 1+: Pt supports self with upper extremities but requires therapist assistance. Pt performs >50% effort. (Min assist). CL, 60% to <80% impaired. 2: Pt supports self independently with both upper extremities (walker, crutches, parallel bars). CL, 60% to <80% impaired. 2+: Pt support self independently with 1 upper extremity (cane, crutch, 1 parallel bar). CK, 40% to <60% impaired. 3: Pt stands without upper extremity support for up to 30 seconds. CK, 40% to <60% impaired. 3+: Pt stands without upper extremity support for 30 seconds or greater. CJ, 20% to <40% impaired. 4: Pt independently moves and returns center of gravity 1-2 inches in one plane. CJ, 20% to <40% impaired. 4+: Pt independently moves and returns center of gravity 1-2 inches in multiple planes. CI, 1% to <20% impaired. 5: Pt independently moves and returns center of gravity in all planes greater than 2 inches. CH, 0% impaired. Christie Gearing Positioning needs/Additional Comments :  Pt presents in bed, alert and oriented x 4. Pt refused MMT to LLE, reporting pain and soreness. Bed mobility with CGA. Transfers with CGA and verbal and tactile cueing for proper hand placement. Pt required toileting, gait training x 12 ft with CGA using RW to achieve toilet. Assistance required for management of undergarments. Gait training x 18 ft using RW to achieve w/c to prepare for breakfast. Required education on benefits of having meals sitting up, pt eventually agreeable. TE rendered to address strength, endurance, mobility and included: heel raises, toe raises x 10 reps with verbal and visual cueing for proper techniques. Therapist heated pt's food to her satisfaction, educated on PT, and educated on safety including use of call bell, pt verbalized understanding.  Recommend skilled physical therapy services to address deficits, restore function, and for safe return home. TREATMENT PLAN: Rehab Potential : Good  Skilled Physical Therapy Services may include:   [x]           Bed Mobility Training             [x]    Neuromuscular Re-Education  [x]           Transfer Training                   [x]    Orthotic/Prosthetic Training  [x]           Gait Training                          [x]    Modalities  [x]           Therapeutic Procedures        [x]    Manual Therapy  [x]           Therapeutic Activities            [x]    Patient and Family Training/Education  []           Other (comment):     Frequency/Duration: Patient will be followed by physical therapy for 1-2 times a day for a minimum of 3 days per week for 4 weeks to address goals. Discharge Recommendations: Home Health  Patient's expected Discharge Location:  [x]Private Residence  [] prison/ILF  []LTC  []Other:     COMMUNICATION/EDUCATION:  Patient/Family Agreement with Treatment Plan :     [x]         The PT evaluation/POC has been reviewed with PT assistant. [x]         Fall prevention education was provided and the patient/caregiver indicated understanding. [x]         Patient/family have participated as able in goal setting and plan of care and Patient/family agree to work toward stated goals and plan of care. []         Patient is unable to participate in goal setting and plan of care. Therapist/SW will contact family/POA. Treatment session:  Overall Complexity: LOW  Evaluation:  32 mins./ Treatment:  49 mins.   Physical Therapist:   Alisson Duckworth, PT       8/6/2018   Thank you for this referral.

## 2018-08-06 NOTE — ROUTINE PROCESS
Written shift change report given to Ileana Monreal RN (oncoming nurse) by Sharonda House RN (offgoing nurse). Report included the following information SBAR, Kardex and MAR.

## 2018-08-06 NOTE — ROUTINE PROCESS
X  Physical Therapy          Functional goals:            ?   Maintain current functional status            X    Improvement            Functional interventions:                - to improve walking                - to improve strength                - to improve balance                - to improve endurance                - to improve ability to use stairs                 - to improve general moving             Frequency:  1-2X/ day for 3-6 visits/ week     ? Occupational Therapy          Functional goals:             ?  Maintain current functional status             ?  ]   Improvement            Functional interventions:             Frequency:         ?    Speech Therapy          Functional goals:             ?  ] Maintain current functional status             ?      Improvement            Functional interventions:             Frequency:

## 2018-08-06 NOTE — PROGRESS NOTES
Problem: Self Care Deficits Care Plan (Adult)  Goal: *Acute Goals and Plan of Care (Insert Text)  OCCUPATIONAL THERAPY SHORT TERM GOALS    Initiated 8/6/2018 and to be accomplished within 2 Week(s)    1. Patient will perform Upper body ADL's without adaptive equipment with standby assist.  2.  Patient will perform Lower body ADL's with adaptive equipment as needed with minimal assistance/contact guard assist .  3. Patient will perform toileting task with standby assist with Good safety to reduce falls risk. 4.  Patient will perform toilet transfers with Janeth Tesfaye and standby assist..  5.  Patient will perform functional task in standing for 5 minutes with Good static/dynamic balance for improved ADL/IADL function with standby assist and Good safety awareness. 6.  Patient will improve Barthel index scores to atleast 65/100 to improve functional mobility. 7.  Patient will utilize energy conservation techniques during functional activities with minimal verbal cues. OCCUPATIONAL THERAPY LONG TERM GOALS   Initiated 8/6/2018 and to be accomplished within 4 Week(s)    1. Patient will perform ADL's & IADLs with adaptive equipment as needed with modified independence. 2.  Patient will perform toileting task with modified independence with Good safety to reduce falls risk. 3.  Patient will perform all functional transfers utilizing least restrictive device and durable medical equipment as needed with modified independence and Good balance and safety awareness. 4.  Patient will perform standing static/dynamic activity for improved ADL/IADL function with modified independence and Good balance and safety awareness. 5.  Patient will improve Barthel index score to 95/100 to improve independence with mobility. 6. Patient will perform home making tasks and simple meal prep Mod I utilizing energy conservation techniques and good safety awareness.       Therapist: Deion Pace    8/6/2018         Transitional [facility]   Occupational Therapy EVALUATION      Patient: Sam Tomas (11 y.o. female)            Start of Care Date: 8/6/2018                         Onset Date:  7/31/18  Referred by : Gianluca Alvarez MD         Primary Diagnosis: syncope       Treatment Diagnosis  Treatment Diagnosis: muscle weakness  Treatment Diagnosis 2: increased need for assist with self care     Patient is a 80 y.o. Female admitted to [facility] 7/31/18 s/p fall while gardening 2/2 syncope and collapse, down on ground for 7 hours. Patient diagnosed with head injury: contusion to left side of forehead, left knee pain, low back pain and treated for hypomagnesium. unsafe to discharge home and transferred to [facility] and referred to skilled Occupational therapy in order to reach maximal functional potential for safe discharge home at [facility]. Precautions: Fall  Eval Complexity: History: HIGH Complexity : Extensive review of history including physical, cognitive and psychosocial history . History of present problem reveals HIGH Complexity :3+ comorbidities / personal factors will impact the outcome/ POC . Past Medical history includes:   Past Medical History:   Diagnosis Date    Arthritis     CAD (coronary artery disease) 2001    3 coronary stents    Cardiac complication     Hypertension     Migraine 3/1/2015    Polymyalgia rheumatica ([facility] [address] 2/28/2015     Past Surgical History:   Procedure Laterality Date    CARDIAC SURG PROCEDURE UNLIST      3 stents      Cognitive Status:  Mental Status  Neurologic State: Appropriate for age; Alert  Orientation Level: Oriented X4  Cognition: Follows commands  Perception: Appears intact  Perseveration: No perseveration noted  Safety/Judgement: Fall prevention  Barriers to Learning/Limitations: yes;  sensory deficits-vision/hearing/speech  Compensate with: visual, verbal, tactile, kinesthetic cues/model  Justification for Evaluation complexity:   HIGH Complexity : Patient presents with comorbidities that affect occupational performance. Signifigant modification of tasks or assistance (eg, physical or verbal) with assessment (s) is necessary to enable patient to complete evaluation . Patient is referred to [address],[facility] due to a functional decline in strength, balance, coordination, safety awareness and functional activity tolerance, which is inhibiting independence w/ self-care performance skills and functional mobility. Prior Level of Function/Home Situation:   Home Situation  Home Environment: Private residence  # Steps to Enter: 5  Rails to Enter: Yes  Hand Rails : Left  One/Two Story Residence: One story  Living Alone: Yes  Support Systems: Home care staff, Friends \ neighbors (son lives in [facility])  Patient Expects to be Discharged to[de-identified] Private residence  Current DME Used/Available at Home: Grab bars, Raised toilet seat, Cane, straight, Walker, rolling  Tub or Shower Type: Tub/Shower combination (does not use)  Level of Assistance received at Home: Prior to this current hospitalization, the patient reports she was (I) w/ ADLs and sponge bathed, Mod I w/ functional mobility using SPC or RW, (I) w/ IADLs, meal prep, housekeeping tasks. Hired help 2-3x/wk to assist with transportation and grocery shopping. OBJECTIVE DATA SUMMARY:     Vital Signs:  Resting BP:  BP: 126/61  HR: Pulse (Heart Rate): 64     Pain:     Auditory:  Auditory  Auditory Impairment: Hard of hearing, left side  Examination:   HIGH Complexity : 5 or more performance deficits relating to physical, cognitive , or psychosocial skils that result in activity limitations and / or participation restrictions; Tone and Sensation:  Tone:  (BUEs,shoulder flexion 90 degrees, elbow WNL)              Sensation: Intact (BUEs,shoulder flexion 90 degrees, elbow WNL)               Visual Perceptual:       Coordination:  Coordination  Fine Motor Skills-Upper: Left Intact; Right Intact  Gross Motor Skills-Upper: Left Intact; Right Intact  Coordination: Within functional limits (BUEs,shoulder flexion 90 degrees, elbow WNL)  Fine Motor Skills-Upper: Left Intact; Right Intact    Gross Motor Skills-Upper: Left Intact; Right Intact  Bed Mobility:  Bed Mobility  Rolling: Contact guard assistance  Supine to Sit: Contact guard assistance  Sit to Supine: Minimum assistance (elevating LLE)  Scooting: Contact guard assistance  Rolling: Contact guard assistance  Transfers:  Functional Transfers  Sit to Stand: Contact guard assistance  Stand to Sit: Contact guard assistance  Toilet Transfer : Contact guard assistance  Balance:  Balance  Sitting: Without support  Sitting - Static: Good (unsupported)  Sitting - Dynamic: Fair (occasional) (+)  Standing: With support  Standing - Static: Fair (+)  Standing - Dynamic : Fair  Gross Assesment:  Gross Assessment  AROM: Generally decreased, functional (BUEs,shoulder flexion 90 degrees, elbow WNL)  PROM: Generally decreased, functional (BUEs,shoulder flexion 110 degrees, elbow WNL)  Strength: Generally decreased, functional (BUEs 3+/5 within range)  Coordination: Within functional limits (BUEs,shoulder flexion 90 degrees, elbow WNL)  Tone:  (BUEs,shoulder flexion 90 degrees, elbow WNL)  Sensation: Intact (BUEs,shoulder flexion 90 degrees, elbow WNL)  ADL self care:     ADL Intervention:  Upper Body Bathing  Bathing Assistance: Contact guard assistance  Upper Body Dressing Assistance  Dressing Assistance: Contact guard assistance  Lower Body Bathing  Bathing Assistance: Minimum assistance  Lower Body : Maximum assistance  Toileting  Toileting Assistance: Contact guard assistance  Bladder Hygiene: Contact guard assistance  Clothing Management: Contact guard assistance  Adaptive Equipment: Elevated seat;Walker;Grab bars  Grooming  Grooming Assistance: Stand-by assistance  Feeding  Feeding Assistance: Modified independent       Wheelchair Management:    not assessed  Instrumental ADL's:not assessed      ASSESSMENT:   A clinical decision making of HIGH  complexity was conducted, which includes an analysis of the Occupational profile, standardized assessments, data and treatment options. THE BARTHEL INDEX    ACTIVITY   SCORE   FEEDING  0=unable  5=needs help cutting,spreading butter,etc., or modified diet  10= independent   10   BATHING  0=dependent  5=independent (or in shower   0   GROOMING  0=needs help  5=independent face/hair/teeth/shaving (implements provided)   0   DRESSING  0=dependent  5=needs help but can do about half unaided  10=independent(including buttons, zips,laces etc.)   5   BOWELS  0=incontinent  5=occasional accident  10=continent   10   BLADDER  0=incontinent, or catheterized and unable to manage alone  5=occasional accident  10=continent   5   TOILET USE  0=dependent  5=needs some help, but can do something alone  10=independent (on and off, dressing, wiping)   5   TRANSFER (BED TO CHAIR AND BACK)  0=unable, no sitting balance  5=major help(one or two people,physical), can sit  10=minor help(verbal or physical)  15=independent   10   MOBILITY (ON LEVEL SURFACES)  0=immobile or <50 yards  5=wheelchair independent,including corners,>50 yards  10=walkes with help of one person (verbal or physical) >50 yards  15=independent(but may use any aid; for example, stick) >50 yards   0   STAIRS  0=unable  5=needs help (verbal, physical, carrying aid)  10=independent   0             TOTAL:             45/100      Additional comments:    Patient is A & O x 4, Dep to doff/don socks. CGA toilet transfers and tasks, washing hands seated w/c level w/ SBA. Patient educated on role of OT and POC; patient verbalized understanding. Pt. Instructed on safety awareness with importance to utilize call bell to request assist with functional transfers to prevent falls, patient verbalized understanding and provided accurate demonstration.   Dry erase communication board updated for accuracy w/ carryover for toilet transfers: CGA w/ RW. Patient left sitting up in w/c to drink her tea and call bell within reach. TREATMENT PLAN : Rehab Potential : Excellent  Skilled Occupational Therapy Services may include:   [x] Self Care/Home Mgmt                    []Cognitive Perceptual Re-training                              [x] Adaptive Equip Training                 [x]Therapeutic Exercise                  []Sensory Integration                           []Community Work Integration  [x]Therapeutic Activities                     []Other/modalities  [x]Neuromuscular Re-education         [x] Wheelchair Mgmt/propulsion    [x]Patient/Family/Staff Instruction      :  []Orthotics/Prosthetic Fitting & training     Frequency/Duration: Patient will be followed by Occupational therapy for 1-2 times a day for a minimum of 3 days per week for 4 weeks to address goals. Discharge Recommendations: Home Health  Patient expected Discharge Location: [x]Private Residence  [] [facility]  []LTC  [] [facility]     COMMUNICATION/EDUCATION:  Patient/Family Agreement with Treatment Plan :     [x]   OT Evaluation/POC has been reviewed with the KUMAR. [x]        Fall prevention education was provided and the patient/caregiver indicated understanding  [x]        Patient/family have participated as able in goal setting and plan of care. Patient/family agree to work toward stated goals and plan of care. []        Patient is unable to participate in goal setting and plan of care. Therapist will contact SW/POA. Treatment session:   Overall Complexity:HIGH  Evaluation: 30 mins. Treatment :  40  mins.     Occupational Therapist:   Marcia Walker          8/6/2018   Thank You for this referral.

## 2018-08-06 NOTE — CDMP QUERY
=>Syncope and collapse related to dehydration  =>Syncope and collapse related to electrolyte imbalance  =>Syncope and collapse related to cardiac arrhythmia   or   =>Other Explanation of clinical findings  =>Unable to Determine (no explanation of clinical findings)n  The medical record reflects the following:    Risk: 81 yo female admitted with syncope and collapse    Clinical Indicators: EKG with SR wi/1st  degree AVB    Per ED  80 y.o. Female who states she slip and fell in yard this morning, passing out twice    Per H&P Patient will be admitted to telemetry for monitoring for possible arrhythmia    Treatment:     Please clarify and document your clinical opinion in the progress notes and discharge summary including the definitive and/or presumptive diagnosis, (suspected or probable), related to the above clinical findings. Please include clinical findings supporting your diagnosis. If you DECLINE this query or would like to communicate with [facility], please utilize the \"[employer] message box\" at the TOP of the Progress Note on the right.       Thank you,  Sheba Houston RN   [phone]

## 2018-08-06 NOTE — PROGRESS NOTES
Nutrition initial assessment/Plan of care [facility]     RECOMMENDATIONS:     1. Cardiac Diet  2. Monitor weight, labs and PO intake  3. RD to follow     GOALS:     1. PO intake meets >75% of protein/calorie needs by 8/13  2. Weight Maintenance (+/- 1-2 lb) by 8/13      ASSESSMENT:     Weight status is classified as normal per BMI of 20.6 However, patient is at nutrition risk due to BMI below 23 with patient above 72years of age. PO intake is adequate. Labs noted. Pt / hyponatremia and elevated BUN. Nutrition recommendations listed. RD to follow. Nutrition Diagnoses:   No nutrition diagnosis at this time. Nutrition Risk:  [] High  [] Moderate [x]  Low    SUBJECTIVE/OBJECTIVE:      Pt transferred from  to [facility] on 8/4/2018 after being admitted with syncope and collapse. Patient with wound (left elbow, bruise to forehead, lower back) from fall. Patient reports having a good appetite and eating all of meals. States weight has been stable at 121 lb. Denies food allergies and problems with chewing/swallowing. State she is usually regular with her BM and has not had one today so will order prune juice on AM trays. Last BM (8/3). Will monitor.     Information Obtained from:    [x] Chart Review   [x] Patient   [] Family/Caregiver   [] Nurse/Physician   [] Interdisciplinary Meeting/Rounds    Diet: Cardiac Diet  Medications: [x] Reviewed    Allergies: [x] Reviewed   Patient Active Problem List   Diagnosis Code    Polymyalgia rheumatica (HCC) M35.3    Altered mental status R41.82    CAD (coronary artery disease) I25.10    Hypomagnesemia E83.42    Syncope R55    Hypokalemia E87.6    HTN (hypertension) I10    Knee pain, left M25.562    Weakness R53.1       Past Medical History:   Diagnosis Date    Arthritis     CAD (coronary artery disease) 2001    3 coronary stents    Cardiac complication     Hypertension     Migraine 3/1/2015    Polymyalgia rheumatica ([facility] [address] 2/28/2015      Labs:    Lab Results   Component Value Date/Time    Sodium 133 (L) 08/05/2018 01:54 PM    Potassium 3.6 08/05/2018 01:54 PM    Chloride 93 (L) 08/05/2018 01:54 PM    CO2 30 08/05/2018 01:54 PM    Anion gap 10 08/05/2018 01:54 PM    Glucose 130 (H) 08/05/2018 01:54 PM    BUN 29 (H) 08/05/2018 01:54 PM    Creatinine 0.89 08/05/2018 01:54 PM    Calcium 9.9 08/05/2018 01:54 PM    Magnesium 2.1 08/01/2018 06:50 AM    Albumin 3.5 07/31/2018 08:12 PM     Anthropometrics: BMI (calculated): 20.6  Last 3 Recorded Weights in this Encounter    08/04/18 2145   Weight: 56.1 kg (123 lb 9.6 oz)      Ht Readings from Last 1 Encounters:   07/31/18 5' 5\" (1.651 m)     Weight Metrics 8/4/2018 8/4/2018 3/5/2015 2/20/2013 2/17/2013   Weight 123 lb 9.6 oz 126 lb 153 lb 163 lb 11.2 oz 178 lb 1.6 oz   BMI 20.57 kg/m2 20.97 kg/m2 25.46 kg/m2 30.95 kg/m2 30.56 kg/m2       No data found. IBW: 125 lb %IBW: 98% UBW: 121 lb  [] Weight Loss [] Weight Gain [x] Weight Stable    Estimated Nutrition Needs: [x] MSJ    Calories: 0017-5268 Kcal Based on:   [x] Actual BW    Protein:   65 g Based on:   [x] Actual BW    Fluid:       1500 ml Based on:   [x] Actual BW      [x] No Cultural, Yarsani or ethnic dietary need identified.     [] Cultural, Yarsani and ethnic food preferences identified and addressed     Wt Status:  [x] Normal (18.6 - 24.9) [] Underweight (<18.5) [] Overweight (25 - 29.9) [] Mild Obesity (30 - 34.9)  [] Moderate Obesity (35 - 39.9) [] Morbid Obesity (40+)     Nutrition Problems Identified:   [] Suboptimal PO intake   [] Food Allergies  [] Difficulty chewing/swallowing/poor dentition  [] Constipation/Diarrhea   [] Nausea/Vomiting   [x] None  [] Other:     Plan:   [x] Therapeutic Diet  []  Obtained/adjusted food preferences/tolerances and/or snacks options   []  Supplements added   [] Occupational therapy following for feeding techniques  []  HS snack added   []  Modify diet texture   []  Modify diet for food allergies   []  Assist with menu selection   [x] Monitor PO intake on meal rounds   [x]  Continue inpatient monitoring and intervention   [x]  Participated in discharge planning/Interdisciplinary rounds/Team meetings   []  Other:     Education Needs:   [] Not appropriate for teaching at this time due to:   [x] Identified and addressed    Nutrition Monitoring and Evaluation:  [x] Continue ongoing monitoring and intervention  [] Other    Roseline Esquivel

## 2018-08-06 NOTE — PROGRESS NOTES
Dietary Orders:     ?    Regular     ? Diabetic:      X    Cardiac:     ?    Other:       ?    Tube feeding     ? IV fluids for hydration     ? Fluid restrictions:     Residents dietary preferences:      Reason for modified diet:     Dietary Risks      ? Weight loss      ? Swallowing problems      ? Chewing problems      ? Missing teeth/poor dentition      ? Edentulous      ? Mouth pain/discomfort         ? Other    Residents dietary goal:     X      Maintain current weight     ? Prevent weight loss     ? Other   Dietary interventions     ? Eats in dining room     ? Eats in room     ? Dentures/partials     ?      Specialty utensils or devices:     ?     Other

## 2018-08-07 LAB
BACTERIA SPEC CULT: ABNORMAL
SERVICE CMNT-IMP: ABNORMAL

## 2018-08-07 PROCEDURE — 74011636637 HC RX REV CODE- 636/637: Performed by: INTERNAL MEDICINE

## 2018-08-07 PROCEDURE — 74011250637 HC RX REV CODE- 250/637: Performed by: INTERNAL MEDICINE

## 2018-08-07 RX ORDER — POLYETHYLENE GLYCOL 3350 17 G/17G
17 POWDER, FOR SOLUTION ORAL
Status: DISCONTINUED | OUTPATIENT
Start: 2018-08-07 | End: 2018-08-21 | Stop reason: HOSPADM

## 2018-08-07 RX ORDER — FACIAL-BODY WIPES
10 EACH TOPICAL DAILY PRN
Status: DISCONTINUED | OUTPATIENT
Start: 2018-08-07 | End: 2018-08-21 | Stop reason: HOSPADM

## 2018-08-07 RX ORDER — CLONIDINE HYDROCHLORIDE 0.1 MG/1
0.1 TABLET ORAL EVERY 8 HOURS
Status: DISCONTINUED | OUTPATIENT
Start: 2018-08-07 | End: 2018-08-11

## 2018-08-07 RX ORDER — CEPHALEXIN 250 MG/1
500 CAPSULE ORAL EVERY 8 HOURS
Status: COMPLETED | OUTPATIENT
Start: 2018-08-07 | End: 2018-08-12

## 2018-08-07 RX ADMIN — POLYETHYLENE GLYCOL 3350 17 G: 17 POWDER, FOR SOLUTION ORAL at 21:52

## 2018-08-07 RX ADMIN — HYDROCHLOROTHIAZIDE 25 MG: 25 TABLET ORAL at 09:09

## 2018-08-07 RX ADMIN — SIMVASTATIN 40 MG: 40 TABLET, FILM COATED ORAL at 21:52

## 2018-08-07 RX ADMIN — DOCUSATE SODIUM 100 MG: 100 CAPSULE, LIQUID FILLED ORAL at 09:09

## 2018-08-07 RX ADMIN — CEPHALEXIN 500 MG: 250 CAPSULE ORAL at 14:04

## 2018-08-07 RX ADMIN — CEPHALEXIN 500 MG: 250 CAPSULE ORAL at 21:52

## 2018-08-07 RX ADMIN — CLONIDINE HYDROCHLORIDE 0.1 MG: 0.1 TABLET ORAL at 21:52

## 2018-08-07 RX ADMIN — CLONIDINE HYDROCHLORIDE 0.2 MG: 0.1 TABLET ORAL at 06:13

## 2018-08-07 RX ADMIN — LOSARTAN POTASSIUM 100 MG: 50 TABLET ORAL at 09:09

## 2018-08-07 RX ADMIN — DOCUSATE SODIUM 100 MG: 100 CAPSULE, LIQUID FILLED ORAL at 17:37

## 2018-08-07 RX ADMIN — HYDRALAZINE HYDROCHLORIDE 50 MG: 50 TABLET, FILM COATED ORAL at 21:52

## 2018-08-07 RX ADMIN — ATENOLOL 50 MG: 50 TABLET ORAL at 09:10

## 2018-08-07 RX ADMIN — PANTOPRAZOLE SODIUM 40 MG: 40 TABLET, DELAYED RELEASE ORAL at 09:10

## 2018-08-07 RX ADMIN — HYDRALAZINE HYDROCHLORIDE 50 MG: 50 TABLET, FILM COATED ORAL at 09:09

## 2018-08-07 RX ADMIN — HYDRALAZINE HYDROCHLORIDE 50 MG: 50 TABLET, FILM COATED ORAL at 17:37

## 2018-08-07 RX ADMIN — PREDNISONE 5 MG: 5 TABLET ORAL at 09:10

## 2018-08-07 RX ADMIN — ACETAMINOPHEN 650 MG: 325 TABLET, FILM COATED ORAL at 17:37

## 2018-08-07 RX ADMIN — ATENOLOL 50 MG: 50 TABLET ORAL at 21:52

## 2018-08-07 RX ADMIN — ASPIRIN 81 MG 81 MG: 81 TABLET ORAL at 09:09

## 2018-08-07 NOTE — PROGRESS NOTES
Problem: Mobility Impaired (Adult and Pediatric)  Goal: *Acute Goals and Plan of Care (Insert Text)  PHYSICAL THERAPY STG GOALS :  Initiated 8/6/2018 and to be accomplished within 2 Weeks    1. Patient will move from supine to sit and sit to supine  and roll side to side in bed with supervision/set-up. 2.  Patient will transfer from bed to chair and chair to bed with close supervision/set-up using RW. 3.  Patient will perform sit to stand with close supervision/set-up with Good balance and safety awareness. 4.  Patient will ambulate with stand by assistance for 100 feet with RW on level surfaces with 2 turns. 5.  Patient will ascend/descend 5 stairs with left handrail(s) with contact guard assistance to allow for safe home access/exit. 6.  Patient will improve standardized test score for [place] Standing Balance Scale 2+ to reduce fall risk. PHYSICAL THERAPY LTG GOALS :  Initiated 8/6/2018 and to be accomplished within 4 Weeks    1. Patient will move from supine to sit and sit to supine  and roll side to side in bed with modified independence. 2.  Patient will transfer from bed to chair and chair to bed with modified independence using RW/rollator. 3.  Patient will perform sit to stand with modified independence with Good balance and safety awareness. 4.  Patient will ambulate with supervision/set-up for 200 feet with RW/rollator on level surfaces and be able to maneuver through narrow spaces and obstacles without loss of balance. 5.  Patient will ascend/descend 5 stairs with left handrail(s) with supervision/set-up to allow for safe home access/exit. 6.  Patient will improve standardized test score for [place] Standing Balance Scale 3 to reduce fall risk.       Physical Therapist:   Silva Rodriguez, PT  on 8/6/2018            [address]  [facility]   physical Therapy Daily Treatment note      Patient: Jacinta Aschoff (26 y.o. female)               Date: 8/7/2018    Physician: Ghada Gray Anna Salvador MD  Primary Diagnosis: syncope          Treatment Diagnosis  Treatment Diagnosis: muscle weakness  Treatment Diagnosis 2: increased need for assist with self care  Precautions: Fall  Vital Signs  Vital Signs  Pulse (Heart Rate): (!) 58  Resp Rate: 16  O2 Sat (%): 100 %  BP: 91/48  MAP (Calculated): 92     Cognitive Status:     Pain  Pain Screen  Pain Scale 1: Visual  Pain Intensity 1: 0  Patient Stated Pain Goal: 0  Pain Reassessment 1: Patient sleeping  Bed Mobility Training  Bed Mobility Training  Supine to Sit: Stand-by assistance  Scooting: Contact guard assistance  Balance  Sitting: Without support  Sitting - Static: Good (unsupported)  Sitting - Dynamic: Fair (occasional) (+)  Standing: With support  Standing - Static: Fair (+)  Standing - Dynamic : Fair  Transfer Training  Transfer Training  Sit to Stand: Contact guard assistance  Sit to Stand: Contact guard assistance  Gait Training                       Therapeutic Exercise:    Upon initial presentation, pt in bed, preparing for breakfast. Wilfredo Hodgkin was present and reported that she will assist pt upon return home, as well as hired caregivers. Therapist inquired about hours for caregivers, she reported that family conference with pt's son in [facility] will be this evening at 5pm to discuss caregivers. Candido Dye then inquired about [employer], therapist f/u via e-mail with SW. Therapist informed pt that she would return, pt denied needing anything at that time. Upon return after lunch, pt refused treatment reporting being very tired. Therapist encouraged and educated on benefits of participation, pt adamant about returning to bed. Nurse at bedside to assist with transfer to bed. Therapist f/u about 30 minutes later, pt asleep. Will f/u on next treatment date. Patient/Caregiver Education:   Pt /Caregiver Education on(see above). ASSESSMENT:  Patient continues to benefit from Skilled PT services to improve mobility.    Progression toward goals:  []      Improving appropriately and progressing toward goals  [x]      Improving slowly and progressing toward goals  []      Not making progress toward goals and plan of care will be adjusted     Treatment session: 15 minutes.   Therapist:   Rosalind Anderson, PT,          8/7/2018

## 2018-08-07 NOTE — PROGRESS NOTES
conducted an initial consultation and Spiritual Assessment for Supriya Bird, who is a 80 y.o.,female. Patients Primary Language is: [place]. According to the patients EMR Lutheran Affiliation is: Fernandoibhughi. The reason the Patient came to the hospital is:   Patient Active Problem List    Diagnosis Date Noted    Syncope 08/01/2018    Hypokalemia 08/01/2018    HTN (hypertension) 08/01/2018    Knee pain, left 08/01/2018    Weakness 08/01/2018    Hypomagnesemia 07/31/2018    Altered mental status 03/02/2015    CAD (coronary artery disease) 03/02/2015    Polymyalgia rheumatica ([facility] [address] 02/28/2015        The  provided the following Interventions:  Initiated a relationship of care and support. Explored issues of darien, belief, spirituality and Worship/ritual needs while hospitalized. Listened empathically. Provided chaplaincy education. Provided information about Spiritual Care Services. Offered prayer and assurance of continued prayers on patient's behalf. Chart reviewed. The following outcomes where achieved:  Patient shared limited information about both their medical narrative and spiritual journey/beliefs.  confirmed Patient's Lutheran Affiliation. Patient processed feeling about current hospitalization. Patient expressed gratitude for 's visit. Assessment:  Patient does not have any Worship/cultural needs that will affect patients preferences in health care. There are no spiritual or Worship issues which require intervention at this time. Plan:  Chaplains will continue to follow and will provide pastoral care on an as needed/requested basis.  recommends bedside caregivers page  on duty if patient shows signs of acute spiritual or emotional distress.     [address] Almanzar Intern  Spiritual Care   [phone]

## 2018-08-07 NOTE — PROGRESS NOTES
CLIFF conducted the activities interview with pt. Pt enjoys gardening, knitting and cross stitch. SW informed pt and caregiver of things available in the dining room and being able to go outside with caregiver if she gets a pass from staff. Pt was alert and oriented.

## 2018-08-07 NOTE — ROUTINE PROCESS
Bedside and Verbal shift change report given to [place] lpn (oncoming nurse) by memo mahan (offgoing nurse). Report included the following information Kardex, MAR and Recent Results.

## 2018-08-07 NOTE — ROUTINE PROCESS
Patient blood pressure low  (See vital signs), held catapress this afternoon. Dr Fernando Debi in and made aware of blood pressure 91/48.  patient assisted to bed to rest. New orders noted

## 2018-08-07 NOTE — PROGRESS NOTES
ACTIVITIES/ INTERESTS    [  X  ] List of activities prior to [address], gardening, cross stitch    [ X   ] Activities offered to resident: music, going outside,      [ X   ] Activities Calendar:posted in pt's room    [   X ] Barriers to activities:limited mobility, hearing impaired      [   X ] Interventions: assist  With activities in dining room

## 2018-08-07 NOTE — PROGRESS NOTES
Problem: Self Care Deficits Care Plan (Adult)  Goal: *Acute Goals and Plan of Care (Insert Text)  OCCUPATIONAL THERAPY SHORT TERM GOALS    Initiated 8/6/2018 and to be accomplished within 2 Week(s)    1. Patient will perform Upper body ADL's without adaptive equipment with standby assist.  2.  Patient will perform Lower body ADL's with adaptive equipment as needed with minimal assistance/contact guard assist .  3. Patient will perform toileting task with standby assist with Good safety to reduce falls risk. 4.  Patient will perform toilet transfers with Dyane Pavy and standby assist..  5.  Patient will perform functional task in standing for 5 minutes with Good static/dynamic balance for improved ADL/IADL function with standby assist and Good safety awareness. 6.  Patient will improve Barthel index scores to atleast 65/100 to improve functional mobility. 7.  Patient will utilize energy conservation techniques during functional activities with minimal verbal cues. OCCUPATIONAL THERAPY LONG TERM GOALS   Initiated 8/6/2018 and to be accomplished within 4 Week(s)    1. Patient will perform ADL's & IADLs with adaptive equipment as needed with modified independence. 2.  Patient will perform toileting task with modified independence with Good safety to reduce falls risk. 3.  Patient will perform all functional transfers utilizing least restrictive device and durable medical equipment as needed with modified independence and Good balance and safety awareness. 4.  Patient will perform standing static/dynamic activity for improved ADL/IADL function with modified independence and Good balance and safety awareness. 5.  Patient will improve Barthel index score to 95/100 to improve independence with mobility. 6. Patient will perform home making tasks and simple meal prep Mod I utilizing energy conservation techniques and good safety awareness.       Therapist: Oscar Johns    [facility]   Occupational Therapy Daily Treatment note      Patient: Madison Sterling (2 y.o. female)       Date: 8/7/2018  Attending Physician: Lenka Dean MD  Primary Diagnosis: syncope    Treatment Diagnosis  Treatment Diagnosis: muscle weakness  Treatment Diagnosis 2: increased need for assist with self care   Precautions : Precautions at Admission: Fall  Vital Signs:  Vital Signs  Pulse (Heart Rate): 70  Resp Rate: 16  O2 Sat (%): 100 %  BP: 132/72  MAP (Calculated): 92     Cognitive Status:  Mental Status  Neurologic State: Alert  Orientation Level: Oriented to person  Cognition: Follows commands  Pain:  Pain Screen  Pain Scale 1: Visual  Pain Intensity 1: 0  Patient Stated Pain Goal: 0  Pain Reassessment 1: Patient sleeping  Pain Scale 1: Visual  Gross Assessment:     Coordination:     Bed Mobility:  Bed Mobility  Supine to Sit: Stand-by assistance  Scooting: Contact guard assistance  Transfers:  Functional Transfers  Sit to Stand: Contact guard assistance  Toilet Transfer : Contact guard assistance  Functional Transfers  Toilet Transfer : Contact guard assistance  Adaptive Equipment: Walker (comment)  Balance:  Balance  Sitting: Without support  Sitting - Static: Good (unsupported)  Sitting - Dynamic: Fair (occasional) (+)  Standing: With support  Standing - Static: Fair (+)  Standing - Dynamic : Fair  ADL Self Care:     ADL Intervention:Second tx session:     Upper Body Dressing Assistance  Dressing Assistance: Stand-by assistance  Pullover Shirt: Stand-by assistance     Toileting  Toileting Assistance: Contact guard assistance  Bladder Hygiene: Contact guard assistance  Bowel Hygiene: Contact guard assistance  Clothing Management: Contact guard assistance  Adaptive Equipment: Elevated seat;Walker  Grooming  Brushing Teeth: Supervision/set-up  Brushing/Combing Hair: Supervision/set-up  Lower Body Dressing Assistance  Dressing Assistance:  Moderate assistance  Protective Undergarmet: Minimum assistance  Pants With Elastic Waist: Minimum assistance  Socks: Moderate assistance  Adaptive Equipment Used: Sock aid;Reacher             Therapeutic Activities: first tx session: patient presents preparing to eat breakfast and requested therapist to return later, therapist accommodating and to return later. Nursing notified via paper clipboard documentation protocol of patient c/o 6/10 pain across forehead and knot in back of head. Patient/Caregiver Education:    Patient instructed for use of AE I.e. sock aide &  long handle reacher w/ instruction and demonstration provided for threading pants over feet e.g. doffing socks w/ reacher followed by set up and use for sock aide to don socks, patient requires ongoing repetition to increase accuracy w/ carryover.     ASSESSMENT:  Patient continues to demonstrate the need for skilled Occupational Therapy services to improve grooming, bathing, upper body dressing, lower body dressing, toileting and toilet transfer  Progression toward goals:  []      Improving appropriately and progressing toward goals  [x]      Improving slowly and progressing toward goals  []      Not making progress toward goals and plan of care will be adjusted     Treatment session:   46 minutes    Therapist:    Mary Jackson,  8/7/2018

## 2018-08-07 NOTE — PROGRESS NOTES
I have reviewed this patient's current medication list and recent laboratory results. At this time, I do not suggest any drug therapy adjustments or additional laboratory monitoring. Thank you,  Fatmata TRUONG  Ph. M. S.  8/7/2018

## 2018-08-07 NOTE — PROGRESS NOTES
INITIAL GOALS    My preferred name is: Ramsey Tay     My Representative is:Adama gil    My discharge goal is:      XLocation:         Caregiver:    ?   Discharge to LTC    ? Discharge with Hospice  Residents life history notes prior to        Admission:used cane for mobility and independent with adl's      Residents daily routine and        preferences:    Residents cultural and ethnic        preferences    Outside coordination:    ? Follow/up appointment:             Date/time:      ? Outpatient Procedure :              Date/time:      ?    Transportation needs:       Resident or Caregiver education needs:   /PSYCHOSOCIAL     x    Able to recognize need for           placement in skilled facility     X   Able to make own decisions         Mental Health Needs:   ?    Mental Health History:          ?   Behavior concerns:     ? PASARR Level II Recommendation:     ?    Depression Screening     ? Psychiatric consult needed      Social Service/Psychosocial interventions:      Behavioral interventions:    Community Services Received prior to admission:none other than  care    [employer] needed: home  With home health vs cristhian  Outside coordination:    ? Follow/up appointment:             Date/time:    ?     Outpatient Procedure :              Date/time:    ?     Transportation needs:

## 2018-08-07 NOTE — PROGRESS NOTES
CLIFF met with pt and her caregiver Genia Thomas to complete the social evaluation. Pt gave permission for SW to discuss things in front of there caregiver. Pt lives alone and used a wooden cane for mobility. She was independent with adl's, housekeeping , meals and medication. Her caregiver would assist with transportation to MD appts and grocery shopping. Pt's caregiver agreed to bring in [facility] documents for pt's record. Pt's goal in rehab is\" to build my strength back\". Pt stated that \"I'm weak ans a kitten\". SW informed pt and her caregiver of the rehab process and MD visits. SW informed pt and caregiver that pt has 30 days for rehab at 100% and that she will have a copayment of $ 167.50 per day on day 31 if she's still on TCC. Pt's caregiver informed CLIFF that pt's son won't force pt to go to into Assisted living but pt and her son did tour facilities when he was here recently. Pt admitted that she feels lonely and that her \" old house is getting to be too much\". CLIFF suggested that pt explore her options. CLIFF provided brochures on private duty, [employer] and [employer]. Pt's caregiver informed CLIFF that she will contact pt's son to give him an update re: contact with CLIFF. Pt was pleasant and cooperative. Her caregiver seems very attentive. SW will follow.

## 2018-08-07 NOTE — ROUTINE PROCESS
Dr Jamilah Edmonds called and notified of patient c/o constipation, no bm in 4 days, new orders noted.

## 2018-08-08 PROCEDURE — 74011636637 HC RX REV CODE- 636/637: Performed by: INTERNAL MEDICINE

## 2018-08-08 PROCEDURE — 74011250637 HC RX REV CODE- 250/637: Performed by: INTERNAL MEDICINE

## 2018-08-08 RX ADMIN — POLYETHYLENE GLYCOL 3350 17 G: 17 POWDER, FOR SOLUTION ORAL at 20:20

## 2018-08-08 RX ADMIN — ACETAMINOPHEN 650 MG: 325 TABLET, FILM COATED ORAL at 20:19

## 2018-08-08 RX ADMIN — SIMVASTATIN 40 MG: 40 TABLET, FILM COATED ORAL at 22:11

## 2018-08-08 RX ADMIN — CEPHALEXIN 500 MG: 250 CAPSULE ORAL at 22:11

## 2018-08-08 RX ADMIN — CEPHALEXIN 500 MG: 250 CAPSULE ORAL at 14:29

## 2018-08-08 RX ADMIN — ASPIRIN 81 MG 81 MG: 81 TABLET ORAL at 09:23

## 2018-08-08 RX ADMIN — HYDRALAZINE HYDROCHLORIDE 50 MG: 50 TABLET, FILM COATED ORAL at 16:38

## 2018-08-08 RX ADMIN — DOCUSATE SODIUM 100 MG: 100 CAPSULE, LIQUID FILLED ORAL at 18:16

## 2018-08-08 RX ADMIN — HYDRALAZINE HYDROCHLORIDE 50 MG: 50 TABLET, FILM COATED ORAL at 22:00

## 2018-08-08 RX ADMIN — PANTOPRAZOLE SODIUM 40 MG: 40 TABLET, DELAYED RELEASE ORAL at 09:23

## 2018-08-08 RX ADMIN — HYDRALAZINE HYDROCHLORIDE 50 MG: 50 TABLET, FILM COATED ORAL at 09:23

## 2018-08-08 RX ADMIN — CLONIDINE HYDROCHLORIDE 0.1 MG: 0.1 TABLET ORAL at 05:28

## 2018-08-08 RX ADMIN — LOSARTAN POTASSIUM 100 MG: 50 TABLET ORAL at 09:23

## 2018-08-08 RX ADMIN — CLONIDINE HYDROCHLORIDE 0.1 MG: 0.1 TABLET ORAL at 14:29

## 2018-08-08 RX ADMIN — CEPHALEXIN 500 MG: 250 CAPSULE ORAL at 05:28

## 2018-08-08 RX ADMIN — DOCUSATE SODIUM 100 MG: 100 CAPSULE, LIQUID FILLED ORAL at 09:00

## 2018-08-08 RX ADMIN — PREDNISONE 5 MG: 5 TABLET ORAL at 08:00

## 2018-08-08 RX ADMIN — ATENOLOL 50 MG: 50 TABLET ORAL at 09:23

## 2018-08-08 RX ADMIN — HYDROCHLOROTHIAZIDE 25 MG: 25 TABLET ORAL at 09:23

## 2018-08-08 RX ADMIN — ATENOLOL 50 MG: 50 TABLET ORAL at 20:21

## 2018-08-08 NOTE — ROUTINE PROCESS
Bedside and Verbal shift change report given to Jackie Raman RN (oncoming nurse) by Eduardo Garcia LPN  (offgoing nurse). Report included the following information SBAR, Kardex, MAR and Recent Results.

## 2018-08-08 NOTE — ROUTINE PROCESS
Bedside and Verbal shift change report given to [address] (oncoming nurse) by Leroy Nath RN (offgoing nurse). Report included the following information SBAR, Kardex and MAR.

## 2018-08-08 NOTE — PROGRESS NOTES
Problem: Mobility Impaired (Adult and Pediatric)  Goal: *Acute Goals and Plan of Care (Insert Text)  PHYSICAL THERAPY STG GOALS :  Initiated 8/6/2018 and to be accomplished within 2 Weeks    1. Patient will move from supine to sit and sit to supine  and roll side to side in bed with supervision/set-up. 2.  Patient will transfer from bed to chair and chair to bed with close supervision/set-up using RW. 3.  Patient will perform sit to stand with close supervision/set-up with Good balance and safety awareness. 4.  Patient will ambulate with stand by assistance for 100 feet with RW on level surfaces with 2 turns. 5.  Patient will ascend/descend 5 stairs with left handrail(s) with contact guard assistance to allow for safe home access/exit. 6.  Patient will improve standardized test score for [place] Standing Balance Scale 2+ to reduce fall risk. PHYSICAL THERAPY LTG GOALS :  Initiated 8/6/2018 and to be accomplished within 4 Weeks    1. Patient will move from supine to sit and sit to supine  and roll side to side in bed with modified independence. 2.  Patient will transfer from bed to chair and chair to bed with modified independence using RW/rollator. 3.  Patient will perform sit to stand with modified independence with Good balance and safety awareness. 4.  Patient will ambulate with supervision/set-up for 200 feet with RW/rollator on level surfaces and be able to maneuver through narrow spaces and obstacles without loss of balance. 5.  Patient will ascend/descend 5 stairs with left handrail(s) with supervision/set-up to allow for safe home access/exit. 6.  Patient will improve standardized test score for [place] Standing Balance Scale 3 to reduce fall risk.       Physical Therapist:   Melissa Thao, PT  on 8/6/2018            [address]  [facility]   physical Therapy Daily Treatment note      Patient: Vidya Cervantes (1 y.o. female)               Date: 8/8/2018    Physician: Lashaun Gomes Prince Amrik MD  Primary Diagnosis: syncope          Treatment Diagnosis  Treatment Diagnosis: muscle weakness  Treatment Diagnosis 2: increased need for assist with self care  Precautions: Fall  Vital Signs  Vital Signs  Temp: 97.8 °F (36.6 °C)  Temp Source: Oral  Pulse (Heart Rate): (!) 56  Resp Rate: 18  O2 Sat (%): 100 %  BP: 125/67  MAP (Calculated): 86  BP 1 Method: Automatic  BP 1 Location: Right arm  BP Patient Position: At rest  Cognitive Status:  Mental Status  Neurologic State: Sleeping  Cognition: Poor safety awareness  Pain  Pain Screen  Pain Scale 1: Numeric (0 - 10)  Pain Intensity 1: 0  Patient Stated Pain Goal: 0  Bed Mobility Training  Balance  Sitting: With support  Sitting - Static: Good (unsupported)  Sitting - Dynamic: Fair (occasional) (((+)))  Standing: With support  Standing - Static: Fair (((+)))  Standing - Dynamic : Fair  Transfer Training  Transfer Training  Sit to Stand: Contact guard assistance;Minimum assistance; Additional time  Stand to Sit: Contact guard assistance  Bed to Chair: Contact guard assistance (w/ RW)  Sit to Stand: Contact guard assistance;Minimum assistance; Additional time  Gait Training  Gait  Base of Support: Center of gravity altered  Speed/Tisha: Slow  Step Length: Right shortened;Left shortened  Gait Abnormalities: Decreased step clearance  Ambulation - Level of Assistance: Contact guard assistance  Distance (ft): 30 Feet (ft) (+22)  Assistive Device: Gait belt;Walker, rolling  Gait Abnormalities: Decreased step clearance    With 0 turns. Therapeutic Exercise: Co-treat with OT to maximize functional gains with sit<>stand, standing balance and ambulatory activities. Seated dynamic balance with reaching forward slightly out of IBETH while completing B UE activity with OT. Sit<>stand x4. Pt completed first sit>stand with CGA, 2nd with Mod A and following with Min A, verbal cues for hand placement.  Noted additional time and effort with sit<>stand and transition of B UE's from w/c<>RW. Gait training with abovementioned details and w/c follow for safety. Pt required prolonged seated rest break between attempts of ambulation . Therapist adjusted pt's RW and noted improved posture and kemi with ambulation. At end of session, pt sitting in w/c at bedside, call bell at side. Patient/Caregiver Education:   Pt /Caregiver Education on safety and fall prevention, and hand placement with sit<>stand, pt with some carryover noted, but continues to require frequent reminders, was provided to reduce risk of falls. ASSESSMENT:  Patient continues to benefit from Skilled PT services to improve bed mobility, sit<>stand, transfers, strength, balance, gait and stair negotiation. Progression toward goals:  []      Improving appropriately and progressing toward goals  [x]      Improving slowly and progressing toward goals  []      Not making progress toward goals and plan of care will be adjusted     Treatment session: 44 minutes.   Therapist:   Leonel Wesley PTA,          8/8/2018

## 2018-08-08 NOTE — PROGRESS NOTES
Problem: Self Care Deficits Care Plan (Adult)  Goal: *Acute Goals and Plan of Care (Insert Text)  OCCUPATIONAL THERAPY SHORT TERM GOALS    Initiated 8/6/2018 and to be accomplished within 2 Week(s)    1. Patient will perform Upper body ADL's without adaptive equipment with standby assist.  2.  Patient will perform Lower body ADL's with adaptive equipment as needed with minimal assistance/contact guard assist .  3. Patient will perform toileting task with standby assist with Good safety to reduce falls risk. 4.  Patient will perform toilet transfers with [facility] and standby assist..  5.  Patient will perform functional task in standing for 5 minutes with Good static/dynamic balance for improved ADL/IADL function with standby assist and Good safety awareness. 6.  Patient will improve Barthel index scores to atleast 65/100 to improve functional mobility. 7.  Patient will utilize energy conservation techniques during functional activities with minimal verbal cues. OCCUPATIONAL THERAPY LONG TERM GOALS   Initiated 8/6/2018 and to be accomplished within 4 Week(s)    1. Patient will perform ADL's & IADLs with adaptive equipment as needed with modified independence. 2.  Patient will perform toileting task with modified independence with Good safety to reduce falls risk. 3.  Patient will perform all functional transfers utilizing least restrictive device and durable medical equipment as needed with modified independence and Good balance and safety awareness. 4.  Patient will perform standing static/dynamic activity for improved ADL/IADL function with modified independence and Good balance and safety awareness. 5.  Patient will improve Barthel index score to 95/100 to improve independence with mobility. 6. Patient will perform home making tasks and simple meal prep Mod I utilizing energy conservation techniques and good safety awareness.       Therapist: Antione Esquivel    [facility]   Occupational Therapy Daily Treatment note      Patient: Inder Holland (89 y.o. female)       Date: 8/8/2018  Attending Physician: Socorro Bender MD  Primary Diagnosis: syncope    Treatment Diagnosis  Treatment Diagnosis: muscle weakness  Treatment Diagnosis 2: increased need for assist with self care   Precautions : Precautions at Admission: Fall  Vital Signs:  Vital Signs  Temp: 97.8 °F (36.6 °C)  Temp Source: Oral  Pulse (Heart Rate): (!) 56  Resp Rate: 18  O2 Sat (%): 100 %  BP: 125/67  MAP (Calculated): 86  BP 1 Method: Automatic  BP 1 Location: Right arm  BP Patient Position: At rest     Cognitive Status:  Mental Status  Neurologic State: Sleeping  Cognition: Poor safety awareness  Pain:  Pain Screen  Pain Scale 1: Numeric (0 - 10)  Pain Intensity 1: 0  Patient Stated Pain Goal: 0  Pain Scale 1: Numeric (0 - 10)  Gross Assessment:     Coordination:     Bed Mobility:     Transfers:  Functional Transfers  Sit to Stand: Contact guard assistance  Bed to Chair: Contact guard assistance (w/ RW)     Balance:  Balance  Sitting: With support  Sitting - Static: Good (unsupported)  Sitting - Dynamic: Fair (occasional) (+)  Standing: With support  Standing - Static: Fair (+)  Standing - Dynamic : Fair  ADL Self Care:     ADL Intervention:                               Therapeutic Activities:Co-tx with P.T. To maximize functional potential w/ balance, safety awareness, functional mobility and activity tolerance to improve ADL performance skills. Functional transfer: CGA w/ RW from edge of bed -> w/c. Patient participated in therapeutic activity incorporating standing balance and tolerance w/ one handed release w/ RW for removal of graded resistive clamps from dowel tree, patient demonstrates good participation and tolerance x 2 mins 45 secs tolerance w/ Fair static standing balance and c/o LLE weakness 2/2 polymyalgia per patient report.  Functional mobility: Min A - Mod A sit to stand w/ verbal/tactile cues for correct hand positioning to push up from and reach back for arm rests, adjustment made to w/c arm rests to grade easier, functional mobility x 22' & 30' w/ RW. Therapeutic Exercises:  UB restorator x 15 minutes w/ three rest breaks in order to increase UB strength, cardiopulmonary capacity and functional activity tolerance to improve ADL performance skills and functional mobility, patient tolerated well. Patient/Caregiver Education:    Education on correct hand placement e.g. Push up/reach back from/for w/c arm rests for sit to stand was provided to improved functional transfers and prevention of falls.       ASSESSMENT:  Patient continues to demonstrate the need for skilled Occupational Therapy services to improve grooming, bathing, upper body dressing, lower body dressing, toileting and toilet transfer  Progression toward goals:  []      Improving appropriately and progressing toward goals  [x]      Improving slowly and progressing toward goals  []      Not making progress toward goals and plan of care will be adjusted     Treatment session:   52 minutes    Therapist:    Aleah Young,  8/8/2018

## 2018-08-08 NOTE — ROUTINE PROCESS
Bedside and Verbal shift change report given to Parker Frausto (oncoming nurse) by Luis Terrazas LPN (offgoing nurse). Report included the following information SBAR, Kardex and MAR.

## 2018-08-09 PROCEDURE — 74011636637 HC RX REV CODE- 636/637: Performed by: INTERNAL MEDICINE

## 2018-08-09 PROCEDURE — 74011250637 HC RX REV CODE- 250/637: Performed by: INTERNAL MEDICINE

## 2018-08-09 RX ADMIN — ASPIRIN 81 MG 81 MG: 81 TABLET ORAL at 09:03

## 2018-08-09 RX ADMIN — HYDRALAZINE HYDROCHLORIDE 50 MG: 50 TABLET, FILM COATED ORAL at 21:00

## 2018-08-09 RX ADMIN — CEPHALEXIN 500 MG: 250 CAPSULE ORAL at 05:43

## 2018-08-09 RX ADMIN — PREDNISONE 5 MG: 5 TABLET ORAL at 09:03

## 2018-08-09 RX ADMIN — CEPHALEXIN 500 MG: 250 CAPSULE ORAL at 14:20

## 2018-08-09 RX ADMIN — DOCUSATE SODIUM 100 MG: 100 CAPSULE, LIQUID FILLED ORAL at 09:02

## 2018-08-09 RX ADMIN — ATENOLOL 50 MG: 50 TABLET ORAL at 09:03

## 2018-08-09 RX ADMIN — SIMVASTATIN 40 MG: 40 TABLET, FILM COATED ORAL at 21:00

## 2018-08-09 RX ADMIN — CLONIDINE HYDROCHLORIDE 0.1 MG: 0.1 TABLET ORAL at 05:43

## 2018-08-09 RX ADMIN — HYDROCHLOROTHIAZIDE 25 MG: 25 TABLET ORAL at 09:03

## 2018-08-09 RX ADMIN — CLONIDINE HYDROCHLORIDE 0.1 MG: 0.1 TABLET ORAL at 14:21

## 2018-08-09 RX ADMIN — DOCUSATE SODIUM 100 MG: 100 CAPSULE, LIQUID FILLED ORAL at 17:24

## 2018-08-09 RX ADMIN — CLONIDINE HYDROCHLORIDE 0.1 MG: 0.1 TABLET ORAL at 21:00

## 2018-08-09 RX ADMIN — POLYETHYLENE GLYCOL 3350 17 G: 17 POWDER, FOR SOLUTION ORAL at 20:57

## 2018-08-09 RX ADMIN — LOSARTAN POTASSIUM 100 MG: 50 TABLET ORAL at 09:02

## 2018-08-09 RX ADMIN — ATENOLOL 50 MG: 50 TABLET ORAL at 20:57

## 2018-08-09 RX ADMIN — HYDRALAZINE HYDROCHLORIDE 50 MG: 50 TABLET, FILM COATED ORAL at 17:24

## 2018-08-09 RX ADMIN — PANTOPRAZOLE SODIUM 40 MG: 40 TABLET, DELAYED RELEASE ORAL at 09:03

## 2018-08-09 RX ADMIN — CEPHALEXIN 500 MG: 250 CAPSULE ORAL at 21:00

## 2018-08-09 NOTE — PROGRESS NOTES
Problem: Self Care Deficits Care Plan (Adult)  Goal: *Acute Goals and Plan of Care (Insert Text)  OCCUPATIONAL THERAPY SHORT TERM GOALS    Initiated 8/6/2018 and to be accomplished within 2 Week(s)    1. Patient will perform Upper body ADL's without adaptive equipment with standby assist.  2.  Patient will perform Lower body ADL's with adaptive equipment as needed with minimal assistance/contact guard assist .  3. Patient will perform toileting task with standby assist with Good safety to reduce falls risk. 4.  Patient will perform toilet transfers with Annia Roll and standby assist..  5.  Patient will perform functional task in standing for 5 minutes with Good static/dynamic balance for improved ADL/IADL function with standby assist and Good safety awareness. 6.  Patient will improve Barthel index scores to atleast 65/100 to improve functional mobility. 7.  Patient will utilize energy conservation techniques during functional activities with minimal verbal cues. OCCUPATIONAL THERAPY LONG TERM GOALS   Initiated 8/6/2018 and to be accomplished within 4 Week(s)    1. Patient will perform ADL's & IADLs with adaptive equipment as needed with modified independence. 2.  Patient will perform toileting task with modified independence with Good safety to reduce falls risk. 3.  Patient will perform all functional transfers utilizing least restrictive device and durable medical equipment as needed with modified independence and Good balance and safety awareness. 4.  Patient will perform standing static/dynamic activity for improved ADL/IADL function with modified independence and Good balance and safety awareness. 5.  Patient will improve Barthel index score to 95/100 to improve independence with mobility. 6. Patient will perform home making tasks and simple meal prep Mod I utilizing energy conservation techniques and good safety awareness.       Therapist: Aleah Young    [facility]   Occupational Therapy Daily Treatment note      Patient: Beronica Watt (84 y.o. female)       Date: 8/9/2018  Attending Physician: Sherri Koenig MD  Primary Diagnosis: syncope    Treatment Diagnosis  Treatment Diagnosis: muscle weakness  Treatment Diagnosis 2: increased need for assist with self care   Precautions : Precautions at Admission: Fall  Vital Signs:  Vital Signs  Temp: 97.3 °F (36.3 °C)  Temp Source: Oral  Pulse (Heart Rate): 89  Resp Rate: 18  O2 Sat (%): 100 %  BP: 130/58  MAP (Calculated): 74  BP 1 Method: Automatic  BP 1 Location: Right arm  BP Patient Position: At rest     Cognitive Status:     Pain:        Gross Assessment:     Coordination:     Bed Mobility:  Bed Mobility  Supine to Sit: Stand-by assistance; Additional time  Scooting: Stand-by assistance; Additional time  Transfers:  Functional Transfers  Sit to Stand: Contact guard assistance;Minimum assistance; Additional time  Stand to Sit: Contact guard assistance     Balance:  Balance  Sitting: With support  Sitting - Static: Good (unsupported)  Sitting - Dynamic: Fair (occasional) (((+)))  Standing: With support  Standing - Static: Fair (((+)))  Standing - Dynamic : Fair       Therapeutic Activities:  Functional mobility utilizing RW from patient's room to therapy room in order to increase functional activity tolerance and independence during routine. Patient was able to tolerate 128ft with close SBA prior to requesting to sit. Static standing activity with two hand release in order to increase standing balance and tolerance needed for ADLS. Patient was able to tolerate standing for 4 mins with SBA prior to requesting to sit. Dynamic standing balance in order to challenge balance and stability needed for ADLS. Therapeutic Exercises:  UB strengthening with 2#, 2 sets x 20 reps in order to increase functional activity tolerance needed for ADLS.     Patient/Caregiver Education:    Marshall Bejarano Education on safe hand placement during transfers was provided for optimal safety.       ASSESSMENT:  Patient continues to demonstrate the need for skilled Occupational Therapy services to improve dynamic standing balance needed for bathing  Progression toward goals:  [x]      Improving appropriately and progressing toward goals  []      Improving slowly and progressing toward goals  []      Not making progress toward goals and plan of care will be adjusted     Treatment session:   51 minutes    Therapist:    JOSÉ Cummings,  8/9/2018

## 2018-08-09 NOTE — ROUTINE PROCESS
Bedside and Verbal shift change report given to Lencho Bliss LPN  (oncoming nurse) by Davin Jacobo LPN (offgoing nurse). Report included the following information SBAR, Kardex and MAR.

## 2018-08-09 NOTE — ROUTINE PROCESS
Bedside and Verbal shift change report given to Sue  (oncoming nurse) by Roselyn Bar LPN (offgoing nurse). Report included the following information SBAR, Kardex and MAR.

## 2018-08-09 NOTE — PROGRESS NOTES
Problem: Mobility Impaired (Adult and Pediatric)  Goal: *Acute Goals and Plan of Care (Insert Text)  PHYSICAL THERAPY STG GOALS :  Initiated 8/6/2018 and to be accomplished within 2 Weeks    1. Patient will move from supine to sit and sit to supine  and roll side to side in bed with supervision/set-up. 2.  Patient will transfer from bed to chair and chair to bed with close supervision/set-up using RW. 3.  Patient will perform sit to stand with close supervision/set-up with Good balance and safety awareness. 4.  Patient will ambulate with stand by assistance for 100 feet with RW on level surfaces with 2 turns. 5.  Patient will ascend/descend 5 stairs with left handrail(s) with contact guard assistance to allow for safe home access/exit. 6.  Patient will improve standardized test score for [place] Standing Balance Scale 2+ to reduce fall risk. PHYSICAL THERAPY LTG GOALS :  Initiated 8/6/2018 and to be accomplished within 4 Weeks    1. Patient will move from supine to sit and sit to supine  and roll side to side in bed with modified independence. 2.  Patient will transfer from bed to chair and chair to bed with modified independence using RW/rollator. 3.  Patient will perform sit to stand with modified independence with Good balance and safety awareness. 4.  Patient will ambulate with supervision/set-up for 200 feet with RW/rollator on level surfaces and be able to maneuver through narrow spaces and obstacles without loss of balance. 5.  Patient will ascend/descend 5 stairs with left handrail(s) with supervision/set-up to allow for safe home access/exit. 6.  Patient will improve standardized test score for [place] Standing Balance Scale 3 to reduce fall risk.       Physical Therapist:   Pattie Mckinney, PT  on 8/6/2018            [address]  [facility]   physical Therapy Daily Treatment note      Patient: Molly Gill (76 y.o. female)               Date: 8/9/2018    Physician: Cristóbal Call Jay Hollingsworth MD  Primary Diagnosis: syncope          Treatment Diagnosis  Treatment Diagnosis: muscle weakness  Treatment Diagnosis 2: increased need for assist with self care  Precautions: Fall  Vital Signs  Vital Signs  Temp: 97.3 °F (36.3 °C)  Temp Source: Oral  Pulse (Heart Rate): 89  Resp Rate: 18  O2 Sat (%): 100 %  BP: 130/58  MAP (Calculated): 74  BP 1 Method: Automatic  BP 1 Location: Right arm  BP Patient Position: At rest  Cognitive Status:  Pain  Bed Mobility Training  Bed Mobility Training  Supine to Sit: Stand-by assistance; Additional time  Scooting: Stand-by assistance; Additional time  Balance  Sitting: With support  Sitting - Static: Good (unsupported)  Sitting - Dynamic: Fair (occasional) (((+)))  Standing: With support  Standing - Static: Fair (((+)))  Standing - Dynamic : Fair  Transfer Training  Transfer Training  Sit to Stand: Contact guard assistance;Minimum assistance; Additional time  Stand to Sit: Contact guard assistance  Sit to Stand: Contact guard assistance;Minimum assistance; Additional time  Gait Training  Gait  Base of Support: Center of gravity altered  Speed/Tisha: Slow  Step Length: Right shortened;Left shortened  Gait Abnormalities: Decreased step clearance  Ambulation - Level of Assistance: Contact guard assistance  Distance (ft): 128 Feet (ft)  Assistive Device: Gait belt;Walker, rolling  Rail Use: Both  Stairs - Level of Assistance: Minimum assistance;Assist X2  Number of Stairs Trained: 3 (4\" steps )  Interventions: Safety awareness training;Verbal cues; Tactile cues  Gait Abnormalities: Decreased step clearance   With 3 turns. Therapeutic Exercise: Pt presented supine in bed. Pt reported feeling better today vs yesterday. Pt agreeable to OOB therapy session. Supine>sit with additional time and effort, SBA. Sit>stand with Min A-CGA with cues for hand placement. Gait training with abovementioned details and w/c follow for safety.  Pt required occasional cues for proximity to RW and to roll vs lifting RW for safety and reduced risk of falls. Stair training initiated, following training and much encouragement, pt negotiated 3 4\" steps as noted above. Pt very fearful with stair training and required much reassurance. Pt required prolonged seated rest break following stair training due to fatigue and increased anxiety. HR assessed following stair training at 63 bpm. Seated dynamic balance with reaching slightly out of IBETH with no noted LOB. Patient/Caregiver Education:   Pt /Caregiver Education on safety and fall prevention, and safety with stair negotiation. Pt very fearful and required much encouragement and assurance to complete task (see above for levels of assistance) was provided to reduce risk of falls. ASSESSMENT:  Patient continues to benefit from Skilled PT services to improve sit<>stand, transfers, strength, balance, ambulation and stair negotiation. Progression toward goals:  []      Improving appropriately and progressing toward goals  [x]      Improving slowly and progressing toward goals  []      Not making progress toward goals and plan of care will be adjusted     Treatment session: 48 minutes.   Therapist:   Tariq Dennis PTA,          8/9/2018

## 2018-08-10 PROCEDURE — 74011250637 HC RX REV CODE- 250/637: Performed by: INTERNAL MEDICINE

## 2018-08-10 PROCEDURE — 74011636637 HC RX REV CODE- 636/637: Performed by: INTERNAL MEDICINE

## 2018-08-10 RX ORDER — ADHESIVE BANDAGE
30 BANDAGE TOPICAL DAILY PRN
Status: DISCONTINUED | OUTPATIENT
Start: 2018-08-10 | End: 2018-08-21 | Stop reason: HOSPADM

## 2018-08-10 RX ADMIN — HYDROCHLOROTHIAZIDE 25 MG: 25 TABLET ORAL at 11:29

## 2018-08-10 RX ADMIN — CEPHALEXIN 500 MG: 250 CAPSULE ORAL at 14:00

## 2018-08-10 RX ADMIN — ATENOLOL 50 MG: 50 TABLET ORAL at 11:30

## 2018-08-10 RX ADMIN — POLYETHYLENE GLYCOL 3350 17 G: 17 POWDER, FOR SOLUTION ORAL at 21:51

## 2018-08-10 RX ADMIN — PANTOPRAZOLE SODIUM 40 MG: 40 TABLET, DELAYED RELEASE ORAL at 11:29

## 2018-08-10 RX ADMIN — DOCUSATE SODIUM 100 MG: 100 CAPSULE, LIQUID FILLED ORAL at 18:11

## 2018-08-10 RX ADMIN — LOSARTAN POTASSIUM 100 MG: 50 TABLET ORAL at 11:29

## 2018-08-10 RX ADMIN — ATENOLOL 50 MG: 50 TABLET ORAL at 21:50

## 2018-08-10 RX ADMIN — MAGNESIUM HYDROXIDE 30 ML: 400 SUSPENSION ORAL at 01:23

## 2018-08-10 RX ADMIN — CEPHALEXIN 500 MG: 250 CAPSULE ORAL at 21:49

## 2018-08-10 RX ADMIN — CEPHALEXIN 500 MG: 250 CAPSULE ORAL at 06:11

## 2018-08-10 RX ADMIN — HYDRALAZINE HYDROCHLORIDE 50 MG: 50 TABLET, FILM COATED ORAL at 21:50

## 2018-08-10 RX ADMIN — CLONIDINE HYDROCHLORIDE 0.1 MG: 0.1 TABLET ORAL at 21:50

## 2018-08-10 RX ADMIN — SIMVASTATIN 40 MG: 40 TABLET, FILM COATED ORAL at 21:50

## 2018-08-10 RX ADMIN — PREDNISONE 5 MG: 5 TABLET ORAL at 11:30

## 2018-08-10 RX ADMIN — CLONIDINE HYDROCHLORIDE 0.1 MG: 0.1 TABLET ORAL at 06:10

## 2018-08-10 RX ADMIN — DOCUSATE SODIUM 100 MG: 100 CAPSULE, LIQUID FILLED ORAL at 11:30

## 2018-08-10 RX ADMIN — ASPIRIN 81 MG 81 MG: 81 TABLET ORAL at 11:29

## 2018-08-10 RX ADMIN — HYDRALAZINE HYDROCHLORIDE 50 MG: 50 TABLET, FILM COATED ORAL at 11:30

## 2018-08-10 NOTE — PROGRESS NOTES
Problem: Self Care Deficits Care Plan (Adult)  Goal: *Acute Goals and Plan of Care (Insert Text)  OCCUPATIONAL THERAPY SHORT TERM GOALS    Initiated 8/6/2018 and to be accomplished within 2 Week(s)    1. Patient will perform Upper body ADL's without adaptive equipment with standby assist.  2.  Patient will perform Lower body ADL's with adaptive equipment as needed with minimal assistance/contact guard assist .  3. Patient will perform toileting task with standby assist with Good safety to reduce falls risk. 4.  Patient will perform toilet transfers with [place] and standby assist..  5.  Patient will perform functional task in standing for 5 minutes with Good static/dynamic balance for improved ADL/IADL function with standby assist and Good safety awareness. 6.  Patient will improve Barthel index scores to atleast 65/100 to improve functional mobility. 7.  Patient will utilize energy conservation techniques during functional activities with minimal verbal cues. OCCUPATIONAL THERAPY LONG TERM GOALS   Initiated 8/6/2018 and to be accomplished within 4 Week(s)    1. Patient will perform ADL's & IADLs with adaptive equipment as needed with modified independence. 2.  Patient will perform toileting task with modified independence with Good safety to reduce falls risk. 3.  Patient will perform all functional transfers utilizing least restrictive device and durable medical equipment as needed with modified independence and Good balance and safety awareness. 4.  Patient will perform standing static/dynamic activity for improved ADL/IADL function with modified independence and Good balance and safety awareness. 5.  Patient will improve Barthel index score to 95/100 to improve independence with mobility. 6. Patient will perform home making tasks and simple meal prep Mod I utilizing energy conservation techniques and good safety awareness.       Therapist: Jose A Bahena    [facility]   Occupational Therapy Daily Treatment note      Patient: Jacinta Aschoff (71 y.o. female)       Date: 8/10/2018  Attending Physician: Belinda Salter MD  Primary Diagnosis: syncope    Treatment Diagnosis  Treatment Diagnosis: muscle weakness  Treatment Diagnosis 2: increased need for assist with self care   Precautions : Precautions at Admission: Fall  Vital Signs:  Vital Signs  Temp: 97.3 °F (36.3 °C)  Temp Source: Oral  Pulse (Heart Rate): 60  Resp Rate: 20  O2 Sat (%): 99 %  BP: 144/72  MAP (Calculated): 96  BP 1 Method: Automatic  BP 1 Location: Right arm  BP Patient Position: At rest     Cognitive Status:  Mental Status  Neurologic State: Confused  Orientation Level: Oriented to person  Cognition: Poor safety awareness; Impulsive  Pain:  Pain Screen  Pain Scale 1: Numeric (0 - 10)  Pain Intensity 1: 0  Patient Stated Pain Goal: 0  Pain Scale 1: Numeric (0 - 10)  Gross Assessment:     Coordination:     Bed Mobility:  Bed Mobility  Supine to Sit: Stand-by assistance; Additional time  Scooting: Stand-by assistance; Additional time  Transfers:  Functional Transfers  Sit to Stand: Contact guard assistance; Additional time  Stand to Sit: Contact guard assistance  Toilet Transfer : Contact guard assistance  Functional Transfers  Toilet Transfer : Contact guard assistance  Balance:  Balance  Sitting: With support  Sitting - Static: Good (unsupported)  Sitting - Dynamic: Fair (occasional) (+)  Standing: With support  Standing - Static: Fair (+)  Standing - Dynamic : Fair  ADL Self Care:  Basic ADL  Toileting: Contact guard assistance  Therapeutic Activities:  Pt presented supine in bed with HOB raised ~ 30 degrees. Pt demo fnxl bed mobility and toilet transfer, see above. CGA fnxl bathroom and room mobility with RW requiring increased time. Pt performed toileting routine CGA with raised toilet seat and grab bar.  Pt stood ~ 5 mins x 2 trials CGA with RW and 1 UE support to complete table top activity to challenge activity tolerance and balance for increased safety during all ADL tasks. Pt required mod cueing for redirection of task secondary to anxiety about not hearing from caregiver in a few days, pt able to talk to Laura Starks via phone call. Patient/Caregiver Education:    Pt educated on importance of OOB activity to increase strength and endurance to achieve optimal safety. ASSESSMENT:  Patient continues to demonstrate the need for skilled Occupational Therapy services to improve strength, balance, and endurance.    Progression toward goals:  []      Improving appropriately and progressing toward goals  [x]      Improving slowly and progressing toward goals  []      Not making progress toward goals and plan of care will be adjusted     Treatment session:   53 minutes    Therapist:    JOSÉ Ye,  8/10/2018

## 2018-08-10 NOTE — PROGRESS NOTES
Problem: Mobility Impaired (Adult and Pediatric)  Goal: *Acute Goals and Plan of Care (Insert Text)  PHYSICAL THERAPY STG GOALS :  Initiated 8/6/2018 and to be accomplished within 2 Weeks    1. Patient will move from supine to sit and sit to supine  and roll side to side in bed with supervision/set-up. 2.  Patient will transfer from bed to chair and chair to bed with close supervision/set-up using RW. 3.  Patient will perform sit to stand with close supervision/set-up with Good balance and safety awareness. 4.  Patient will ambulate with stand by assistance for 100 feet with RW on level surfaces with 2 turns. 5.  Patient will ascend/descend 5 stairs with left handrail(s) with contact guard assistance to allow for safe home access/exit. 6.  Patient will improve standardized test score for [place] Standing Balance Scale 2+ to reduce fall risk. PHYSICAL THERAPY LTG GOALS :  Initiated 8/6/2018 and to be accomplished within 4 Weeks    1. Patient will move from supine to sit and sit to supine  and roll side to side in bed with modified independence. 2.  Patient will transfer from bed to chair and chair to bed with modified independence using RW/rollator. 3.  Patient will perform sit to stand with modified independence with Good balance and safety awareness. 4.  Patient will ambulate with supervision/set-up for 200 feet with RW/rollator on level surfaces and be able to maneuver through narrow spaces and obstacles without loss of balance. 5.  Patient will ascend/descend 5 stairs with left handrail(s) with supervision/set-up to allow for safe home access/exit. 6.  Patient will improve standardized test score for [place] Standing Balance Scale 3 to reduce fall risk.       Physical Therapist:   Cristina Hudson, PT  on 8/6/2018            [facility]   physical Therapy Daily Treatment note      Patient: Adrianna Riley (80 y.o. female)               Date: 8/10/2018    Physician: Prosper Merino Akira Harvey MD  Primary Diagnosis: syncope          Treatment Diagnosis  Treatment Diagnosis: muscle weakness  Treatment Diagnosis 2: increased need for assist with self care  Precautions: Fall  Vital Signs  Vital Signs  Temp: 97.3 °F (36.3 °C)  Temp Source: Oral  Pulse (Heart Rate): 60  Resp Rate: 20  O2 Sat (%): 99 %  BP: 144/72  MAP (Calculated): 96  BP 1 Method: Automatic  BP 1 Location: Right arm  BP Patient Position: At rest  Cognitive Status:  Pain  Bed Mobility Training  Bed Mobility Training  Supine to Sit: Contact guard assistance; Additional time  Scooting: Stand-by assistance; Additional time  Interventions: Safety awareness training;Verbal cues  Balance  Sitting: With support  Sitting - Static: Good (unsupported)  Sitting - Dynamic: Fair (occasional) (((+)))  Standing: With support  Standing - Static: Fair (((+)))  Standing - Dynamic : Fair  Transfer Training  Transfer Training  Sit to Stand: Contact guard assistance; Additional time  Stand to Sit: Contact guard assistance  Bed to Chair: Contact guard assistance (w/c<>toilet with use of grab bar )  Sit to Stand: Contact guard assistance; Additional time  Gait Training  Therapeutic Exercise: Pt presented sitting in w/c at bedside. Therapist planned on gait training. While preparing for sit>stand, pt stated \"Oh, I have to go now\". Pt declined ambulation due to fear of accident. W/c>toilet transfer with use of grab bar and CGA. Pt reported diarrhea and requested to sit on toilet for awhile. Therapist educated pt on use of pull string and notified CNA. Therapist to check back later. Session Two: Pt presented supine in bed. Pt declined OOB at this time. Pt stated \"I did so much yesterday with walking and going on the stairs\" Pt went on to reported feeling as she over did it yesterday and stated \"I'm so weak right now, I should probably stay on bed\".  Therapist discussed safety with stairs, pt reported having someone with her when going down stairs, but admitted to occasionally going outside by herself. Pt stated \"I fell and was outside for so many hours, nobody heard my calling for help\". Therapist suggested a ramp may be a safer option. Pt reported not being able to afford a ramp\" Therapist reviewed ankle pumps while in bed for circulation. Patient/Caregiver Education:   Pt /Caregiver Education on safety and fall prevention, and ankle pumps while in bed, pt verbalized understanding, was provided to promote circulation. ASSESSMENT:  Patient continues to benefit from Skilled PT services to improve sit<>stand, tranfers, strength, balance, gait and stair negotiation. Progression toward goals:  []      Improving appropriately and progressing toward goals  [x]      Improving slowly and progressing toward goals  []      Not making progress toward goals and plan of care will be adjusted     Treatment session: 42 minutes.   Therapist:   Joslyn Santiago PTA,          8/10/2018

## 2018-08-10 NOTE — ROUTINE PROCESS
Bedside and Verbal shift change report given to [address] Cait[address] Armstrong RN(oncoming nurse) by Reena Mariee (offgoing nurse). Report included the following information SBAR, Kardex, MAR and Recent Results.

## 2018-08-10 NOTE — ROUTINE PROCESS
Bedside and Verbal shift change report given to Bryn García LPN (oncoming nurse) by Alisa Alfaro LPN  (offgoing nurse). Report included the following information SBAR, Kardex, Intake/Output and Recent Results.

## 2018-08-10 NOTE — ROUTINE PROCESS
Bedside and Verbal shift change report given to Cary Bhakta LPN (oncoming nurse) by Roselyn Bar LPN (offgoing nurse). Report included the following information SBAR, Kardex, MAR and Recent Results.

## 2018-08-11 PROCEDURE — 74011636637 HC RX REV CODE- 636/637: Performed by: INTERNAL MEDICINE

## 2018-08-11 PROCEDURE — 74011250637 HC RX REV CODE- 250/637: Performed by: INTERNAL MEDICINE

## 2018-08-11 RX ORDER — MAG HYDROX/ALUMINUM HYD/SIMETH 200-200-20
30 SUSPENSION, ORAL (FINAL DOSE FORM) ORAL
Status: DISCONTINUED | OUTPATIENT
Start: 2018-08-11 | End: 2018-08-21 | Stop reason: HOSPADM

## 2018-08-11 RX ADMIN — ASPIRIN 81 MG 81 MG: 81 TABLET ORAL at 09:00

## 2018-08-11 RX ADMIN — ALUMINUM HYDROXIDE, MAGNESIUM HYDROXIDE, AND SIMETHICONE 30 ML: 200; 200; 20 SUSPENSION ORAL at 14:06

## 2018-08-11 RX ADMIN — CEPHALEXIN 500 MG: 250 CAPSULE ORAL at 14:07

## 2018-08-11 RX ADMIN — DOCUSATE SODIUM 100 MG: 100 CAPSULE, LIQUID FILLED ORAL at 09:51

## 2018-08-11 RX ADMIN — CLONIDINE HYDROCHLORIDE 0.1 MG: 0.1 TABLET ORAL at 05:25

## 2018-08-11 RX ADMIN — CEPHALEXIN 500 MG: 250 CAPSULE ORAL at 21:16

## 2018-08-11 RX ADMIN — DOCUSATE SODIUM 100 MG: 100 CAPSULE, LIQUID FILLED ORAL at 19:26

## 2018-08-11 RX ADMIN — ATENOLOL 50 MG: 50 TABLET ORAL at 21:16

## 2018-08-11 RX ADMIN — PANTOPRAZOLE SODIUM 40 MG: 40 TABLET, DELAYED RELEASE ORAL at 09:53

## 2018-08-11 RX ADMIN — SIMVASTATIN 40 MG: 40 TABLET, FILM COATED ORAL at 21:16

## 2018-08-11 RX ADMIN — CEPHALEXIN 500 MG: 250 CAPSULE ORAL at 05:25

## 2018-08-11 RX ADMIN — PREDNISONE 5 MG: 5 TABLET ORAL at 09:52

## 2018-08-11 RX ADMIN — POLYETHYLENE GLYCOL 3350 17 G: 17 POWDER, FOR SOLUTION ORAL at 21:16

## 2018-08-11 NOTE — ROUTINE PROCESS
Bedside and Verbal shift change report given to [facility] lpn (oncoming nurse) by memo mahan (offgoing nurse). Report included the following information Kardex, MAR and Recent Results.

## 2018-08-11 NOTE — H&P
[facility]  HISTORY AND PHYSICAL      Jessica No  MR#: [id number]  : 1925  ACCOUNT #: [de-identified]   ADMIT DATE: 2018    CHIEF COMPLAINT:  Weakness. HISTORY OF PRESENT ILLNESS:  A 59-year-old white female with multiple medical problems admitted to the acute side with a syncopal episode, fall, inability to get up for several hours. The patient had hypokalemia, hypomagnesemia, exacerbation of her osteoarthritis. All were corrected, but the patient was found to be significantly deconditioned and she is being admitted to [facility] for continued rehabilitation. PAST MEDICAL HISTORY:  1. Hypertension. 2.  Polymyalgia rheumatica. 3.  Hypercholesterolemia. 4.  Coronary artery disease status post coronary stenting. 5.  Chronic kidney disease, stage II. 6.  Gastroesophageal reflux disease. 7.  COPD.  8.  Osteoarthritis. 9.  Osteoporosis. 10.  Gout. ALLERGIES:  NO KNOWN DRUG ALLERGIES. REGULAR MEDICATIONS:  1. Tylenol. 2.  DuoNeb. 3.  Aspirin. 4.  Atenolol. 5.  Clonidine. 6.  Colace. 7.  Hydralazine. 8.  Hyzaar. 9.  Protonix. 10.  Prednisone. 11.  Vitamin D. 12.  Zocor. SOCIAL HISTORY:  She is , living alone. Her only son lives in [place], I believe, and she is a nonsmoker, nondrinker. FAMILY HISTORY:  The patient is unable to provide. REVIEW OF SYSTEMS:  She denies chest pain, palpitation, shortness of breath. No abdominal pain, nausea, vomiting. No focal neurologic symptoms. No dysuria, polyuria, hematuria. Patient's blood pressure was noted to be low and medications are being adjusted. PHYSICAL EXAMINATION:  GENERAL:  No acute distress. VITAL SIGNS:  Show blood pressure to be on the low side. HEENT:  Unremarkable. LUNGS:  Clear to auscultation. HEART:  Regular. ABDOMEN:  Soft. EXTREMITIES:  No edema. Significant osteoarthritic changes. NEUROLOGIC:  Nonfocal.  Gait not tested. ASSESSMENT:  1. Deconditioning.   2. Hypertension -- currently hypotensive. 3.  Polymyalgia rheumatica. 4.  Coronary artery disease. 5.  As per past medical history. PLAN:  We will discontinue clonidine and hydralazine, monitor blood pressure and adjust accordingly. Continue other medications. Physical rehabilitation. The patient was told that she should not be living alone, but she has not made up her mind yet. Her son was here recently and initially she agreed on assisted living, but I am not sure what she has in mind. MD TIFFANIE Guzmán/  D: 08/11/2018 15:31     T: 08/11/2018 19:56  JOB #: [id number]

## 2018-08-11 NOTE — ROUTINE PROCESS
Bedside and Verbal shift change report given to Hortencia (oncoming nurse) by Ana Luisa Lopez RN (offgoing nurse). Report included the following information SBAR, Kardex and MAR.

## 2018-08-11 NOTE — PROGRESS NOTES
Problem: Self Care Deficits Care Plan (Adult)  Goal: *Acute Goals and Plan of Care (Insert Text)  OCCUPATIONAL THERAPY SHORT TERM GOALS    Initiated 8/6/2018 and to be accomplished within 2 Week(s)    1. Patient will perform Upper body ADL's without adaptive equipment with standby assist.  2.  Patient will perform Lower body ADL's with adaptive equipment as needed with minimal assistance/contact guard assist .  3. Patient will perform toileting task with standby assist with Good safety to reduce falls risk. 4.  Patient will perform toilet transfers with Armando Cost and standby assist..  5.  Patient will perform functional task in standing for 5 minutes with Good static/dynamic balance for improved ADL/IADL function with standby assist and Good safety awareness. 6.  Patient will improve Barthel index scores to atleast 65/100 to improve functional mobility. 7.  Patient will utilize energy conservation techniques during functional activities with minimal verbal cues. OCCUPATIONAL THERAPY LONG TERM GOALS   Initiated 8/6/2018 and to be accomplished within 4 Week(s)    1. Patient will perform ADL's & IADLs with adaptive equipment as needed with modified independence. 2.  Patient will perform toileting task with modified independence with Good safety to reduce falls risk. 3.  Patient will perform all functional transfers utilizing least restrictive device and durable medical equipment as needed with modified independence and Good balance and safety awareness. 4.  Patient will perform standing static/dynamic activity for improved ADL/IADL function with modified independence and Good balance and safety awareness. 5.  Patient will improve Barthel index score to 95/100 to improve independence with mobility. 6. Patient will perform home making tasks and simple meal prep Mod I utilizing energy conservation techniques and good safety awareness.       Therapist: Luis Mcneil    [facility]   Occupational Therapy Daily Treatment note      Patient: Valeria Garcia (38 y.o. female)       Date: 8/11/2018  Attending Physician: Aniya Garcia MD  Primary Diagnosis: syncope    Treatment Diagnosis  Treatment Diagnosis: muscle weakness  Treatment Diagnosis 2: increased need for assist with self care   Precautions : Precautions at Admission: Fall  Vital Signs:  Vital Signs  Pulse (Heart Rate): 60  BP: 125/60     Cognitive Status:     Pain:  Pain Screen  Pain Scale 1: Numeric (0 - 10)  Pain Intensity 1: 0  Patient Stated Pain Goal: 0  Pain Scale 1: Numeric (0 - 10)  Gross Assessment:     Coordination:     Bed Mobility:  Bed Mobility  Supine to Sit: Stand-by assistance;Contact guard assistance; Additional time  Scooting: Stand-by assistance; Additional time  Transfers:  Functional Transfers  Sit to Stand: Contact guard assistance; Additional time  Stand to Sit: Contact guard assistance  Toilet Transfer : Contact guard assistance  Functional Transfers  Toilet Transfer : Contact guard assistance  Balance:  Balance  Sitting: With support  Sitting - Static: Good (unsupported)  Sitting - Dynamic: Fair (occasional) (+)  Standing: With support  Standing - Static: Fair (+)  Standing - Dynamic : Fair  ADL Self Care:  Basic ADL  Lower Body Dressing: Minimum assistance (donning B LE's through pantholes. )  Toileting: Contact guard assistance  Therapeutic Activities:  Pt demo fnxl bed mobility, toileting, and LB dressing task, see above for level of A. CGA fnxl room and hallway mobility with RW ~ 75 ft with increaed time to improve activity tolerance and endurance needed for ADL activity. Session 2- assisted pt from bed-chair CGA for self feeding task to promote overall improved fnxl performance, pt able to open 4/4 packages. Therapeutic Exercises:   BUE ex with arm bike x 10 mins with no rest break to increase strength and endurance for ADL carryover.    Patient/Caregiver Education:    Pt educated on proper pacing tech for increased safety to decrease fall risk. Pt requires reinforcement due to poor safety awareness. ASSESSMENT:  Patient continues to demonstrate the need for skilled Occupational Therapy services to improve strength, balance, and endurance.    Progression toward goals:  [x]      Improving appropriately and progressing toward goals  []      Improving slowly and progressing toward goals  []      Not making progress toward goals and plan of care will be adjusted     Treatment session:   62 minutes    Therapist:    JOSÉ Braswell,  8/11/2018

## 2018-08-11 NOTE — ROUTINE PROCESS
Patient bp 94/44 hr 58 held clonidine, patient request anti acid after  Starting to eat, Dr. Akira Harvey paged and notified.  Clonidine discontinued

## 2018-08-12 PROCEDURE — 74011636637 HC RX REV CODE- 636/637: Performed by: INTERNAL MEDICINE

## 2018-08-12 PROCEDURE — 74011250637 HC RX REV CODE- 250/637: Performed by: INTERNAL MEDICINE

## 2018-08-12 RX ADMIN — ACETAMINOPHEN 650 MG: 325 TABLET, FILM COATED ORAL at 19:04

## 2018-08-12 RX ADMIN — ATENOLOL 50 MG: 50 TABLET ORAL at 21:05

## 2018-08-12 RX ADMIN — DOCUSATE SODIUM 100 MG: 100 CAPSULE, LIQUID FILLED ORAL at 19:04

## 2018-08-12 RX ADMIN — LOSARTAN POTASSIUM 100 MG: 50 TABLET ORAL at 09:27

## 2018-08-12 RX ADMIN — PREDNISONE 5 MG: 5 TABLET ORAL at 09:29

## 2018-08-12 RX ADMIN — CEPHALEXIN 500 MG: 250 CAPSULE ORAL at 06:06

## 2018-08-12 RX ADMIN — PANTOPRAZOLE SODIUM 40 MG: 40 TABLET, DELAYED RELEASE ORAL at 09:28

## 2018-08-12 RX ADMIN — DOCUSATE SODIUM 100 MG: 100 CAPSULE, LIQUID FILLED ORAL at 09:27

## 2018-08-12 RX ADMIN — ATENOLOL 50 MG: 50 TABLET ORAL at 09:28

## 2018-08-12 RX ADMIN — POLYETHYLENE GLYCOL 3350 17 G: 17 POWDER, FOR SOLUTION ORAL at 21:05

## 2018-08-12 RX ADMIN — ERGOCALCIFEROL 50000 UNITS: 1.25 CAPSULE ORAL at 09:27

## 2018-08-12 RX ADMIN — HYDROCHLOROTHIAZIDE 25 MG: 25 TABLET ORAL at 09:27

## 2018-08-12 RX ADMIN — SIMVASTATIN 40 MG: 40 TABLET, FILM COATED ORAL at 21:05

## 2018-08-12 RX ADMIN — ASPIRIN 81 MG 81 MG: 81 TABLET ORAL at 09:27

## 2018-08-12 NOTE — PROGRESS NOTES
Problem: Self Care Deficits Care Plan (Adult)  Goal: *Acute Goals and Plan of Care (Insert Text)  OCCUPATIONAL THERAPY SHORT TERM GOALS    Initiated 8/6/2018 and to be accomplished within 2 Week(s)    1. Patient will perform Upper body ADL's without adaptive equipment with standby assist.  2.  Patient will perform Lower body ADL's with adaptive equipment as needed with minimal assistance/contact guard assist .  3. Patient will perform toileting task with standby assist with Good safety to reduce falls risk. 4.  Patient will perform toilet transfers with Rudean Coyer and standby assist..  5.  Patient will perform functional task in standing for 5 minutes with Good static/dynamic balance for improved ADL/IADL function with standby assist and Good safety awareness. 6.  Patient will improve Barthel index scores to atleast 65/100 to improve functional mobility. 7.  Patient will utilize energy conservation techniques during functional activities with minimal verbal cues. OCCUPATIONAL THERAPY LONG TERM GOALS   Initiated 8/6/2018 and to be accomplished within 4 Week(s)    1. Patient will perform ADL's & IADLs with adaptive equipment as needed with modified independence. 2.  Patient will perform toileting task with modified independence with Good safety to reduce falls risk. 3.  Patient will perform all functional transfers utilizing least restrictive device and durable medical equipment as needed with modified independence and Good balance and safety awareness. 4.  Patient will perform standing static/dynamic activity for improved ADL/IADL function with modified independence and Good balance and safety awareness. 5.  Patient will improve Barthel index score to 95/100 to improve independence with mobility. 6. Patient will perform home making tasks and simple meal prep Mod I utilizing energy conservation techniques and good safety awareness.       Therapist: Ela Brady    [facility]   Occupational Therapy Daily Treatment note      Patient: Sam Tomas (70 y.o. female)       Date: 8/12/2018  Attending Physician: Gianluca Alvarez MD  Primary Diagnosis: syncope    Treatment Diagnosis  Treatment Diagnosis: muscle weakness  Treatment Diagnosis 2: increased need for assist with self care   Precautions : Precautions at Admission: Fall  Vital Signs:        Cognitive Status:  Mental Status  Neurologic State: Alert;Confused  Orientation Level: Oriented to person  Cognition: Impaired decision making  Pain:  Pain Screen  Pain Scale 1: Visual  Pain Intensity 1: 0  Patient Stated Pain Goal: 0  Pain Reassessment 1: Patient sleeping  Pain Scale 1: Visual  Bed Mobility:  Bed Mobility  Supine to Sit: Stand-by assistance; Additional time  Scooting: Stand-by assistance; Additional time  Balance:  Balance  Sitting: With support  Sitting - Static: Good (unsupported)  Sitting - Dynamic: Fair (occasional) (+)  ADL Self Care:  Basic ADL  Oral Facial Hygiene/Grooming: Setup  Therapeutic Activities:  Pt presented supine in bed upon therapist arrival. Pt reported she was fatigued and did not sleep well requesting bed level activity. Pt performed fnxl bed mobility, see above. Pt tolerated sitting EOB ~ 20 mins unsupported SBA to complete grooming activity reaching forward, OH, and across mid line to challenge core strength and activity tolerance for ADL carryover. Patient/Caregiver Education:    Pt educated on benefits of OOB activity for increased strength and endurance to achieve optimal gains. ASSESSMENT:  Patient continues to demonstrate the need for skilled Occupational Therapy services to improve strength and endurance.  Progression toward goals:  [x]      Improving appropriately and progressing toward goals  []      Improving slowly and progressing toward goals  []      Not making progress toward goals and plan of care will be adjusted     Treatment session:   30 minutes    Therapist:    JOSÉ Rodas,  8/12/2018

## 2018-08-12 NOTE — ROUTINE PROCESS
Bedside and Verbal shift change report given to [facility] LPN(oncoming nurse) by Man Beck (offgoing nurse).  Report included the following information SBAR, Kardex, [facility] and Recent Results

## 2018-08-12 NOTE — ROUTINE PROCESS
Bedside and Verbal shift change report given to [address] (oncoming nurse) by Jola Oppenheim, RN (offgoing nurse). Report included the following information SBAR, Kardex and MAR. Qh visual checks and 24H chart checks.

## 2018-08-12 NOTE — ROUTINE PROCESS
Bedside and Verbal shift change report given to Monica Reilly RN (oncoming nurse) by Senait Varghese LPN  (offgoing nurse). Report included the following information SBAR, Kardex, MAR and Recent Results.

## 2018-08-13 PROCEDURE — 74011636637 HC RX REV CODE- 636/637: Performed by: INTERNAL MEDICINE

## 2018-08-13 PROCEDURE — 74011250637 HC RX REV CODE- 250/637: Performed by: INTERNAL MEDICINE

## 2018-08-13 RX ADMIN — SIMVASTATIN 40 MG: 40 TABLET, FILM COATED ORAL at 21:43

## 2018-08-13 RX ADMIN — DOCUSATE SODIUM 100 MG: 100 CAPSULE, LIQUID FILLED ORAL at 08:15

## 2018-08-13 RX ADMIN — POLYETHYLENE GLYCOL 3350 17 G: 17 POWDER, FOR SOLUTION ORAL at 21:44

## 2018-08-13 RX ADMIN — PANTOPRAZOLE SODIUM 40 MG: 40 TABLET, DELAYED RELEASE ORAL at 08:15

## 2018-08-13 RX ADMIN — DOCUSATE SODIUM 100 MG: 100 CAPSULE, LIQUID FILLED ORAL at 19:15

## 2018-08-13 RX ADMIN — ASPIRIN 81 MG 81 MG: 81 TABLET ORAL at 08:15

## 2018-08-13 RX ADMIN — ATENOLOL 50 MG: 50 TABLET ORAL at 08:15

## 2018-08-13 RX ADMIN — LOSARTAN POTASSIUM 100 MG: 50 TABLET ORAL at 08:15

## 2018-08-13 RX ADMIN — ATENOLOL 50 MG: 50 TABLET ORAL at 21:43

## 2018-08-13 RX ADMIN — ACETAMINOPHEN 650 MG: 325 TABLET, FILM COATED ORAL at 19:15

## 2018-08-13 RX ADMIN — HYDROCHLOROTHIAZIDE 25 MG: 25 TABLET ORAL at 08:15

## 2018-08-13 RX ADMIN — PREDNISONE 5 MG: 5 TABLET ORAL at 08:15

## 2018-08-13 NOTE — PROGRESS NOTES
Problem: Mobility Impaired (Adult and Pediatric)  Goal: *Acute Goals and Plan of Care (Insert Text)  PHYSICAL THERAPY STG GOALS :  Initiated 8/6/2018 and to be accomplished within 2 Weeks (Updated 8/13/18)     1. Patient will move from supine to sit and sit to supine  and roll side to side in bed with supervision/set-up. (Progressing; SBA)     2.  Patient will transfer from bed to chair and chair to bed with close supervision/set-up using RW. (Progressing; SBA)   3. Patient will perform sit to stand with close supervision/set-up with Good balance and safety awareness. (Progressing; SBA)   4. Patient will ambulate with stand by assistance for 100 feet with RW on level surfaces with 2 turns. (Achieved)   5. Patient will ascend/descend 5 stairs with left handrail(s) with contact guard assistance to allow for safe home access/exit. (Progressing; 3 4\" steps with Min A x2 and B HR)   6. Patient will improve standardized test score for [place] Standing Balance Scale 2+ to reduce fall risk. (Achieved)     PHYSICAL THERAPY LTG GOALS :  Initiated 8/6/2018 and to be accomplished within 4 Weeks    1. Patient will move from supine to sit and sit to supine  and roll side to side in bed with modified independence. 2.  Patient will transfer from bed to chair and chair to bed with modified independence using RW/rollator. 3.  Patient will perform sit to stand with modified independence with Good balance and safety awareness. 4.  Patient will ambulate with supervision/set-up for 200 feet with RW/rollator on level surfaces and be able to maneuver through narrow spaces and obstacles without loss of balance. 5.  Patient will ascend/descend 5 stairs with left handrail(s) with supervision/set-up to allow for safe home access/exit. 6.  Patient will improve standardized test score for [place] Standing Balance Scale 3 to reduce fall risk.       Physical Therapist:   Margaret Scott, PT  on 8/6/2018             [facility] [place]   PHYSICAL THERAPY WEEKLY PROGRESS REPORT  Reporting Period:  Date: 8/6/18  to 8/13/18      Patient: Talita Devine (10 y.o. female)                         Date: 8/13/2018    Primary Diagnosis: syncope      Attending Physician: Lana Greenfield MD   Treatment Diagnosis  Treatment Diagnosis: muscle weakness  Treatment Diagnosis 2: increased need for assist with self care  Precautions:  Fall  Rehab Potential : Fair:    Skill interventions and education provided with clinical rationale (include individualized treatment techniques and standardized tests):   Skilled Physical Therapy services were provided with TA to promote greater independence with bed mobility and transfers  TE to build strength and endurance for improved functional mobility  Gait training to address deficits and allow pt to return to PLOF  Stair training to allow pt to safely return home upon DC  Neuromuscular reeducation of movement, coordination and balance for reduced risk of falls. Using a comparative statement, summarize significant progress toward goals as a result of skilled intervention provided:  Patient has made Good progress towards their Physical Therapy goals in the areas of bed mobility, sit<>stand, transfers, and gait. Identify remaining functional areas, impairments limiting progress and/or barriers to improvement:  Patient would benefit from continues PT services to address the following functional deficits in dynamic balance, stair negotiation and strength. Pt is limited by fear of falling (especially with stairs), L knee pain and decreased endurance.      OBJECTIVE DATA SUMMARY:     INITIAL ASSESSMENT WEEKLY ASSESSMENT   COGNITIVE STATUS COGNITIVE STATUS   Neurologic State: Alert, Appropriate for age  Orientation Level: Oriented X4  Cognition: Appropriate for age attention/concentration  Perception: Appears intact  Perseveration: No perseveration noted  Safety/Judgement: Fall prevention Neurologic State: Alert, Confused  Orientation Level: Oriented to person  Cognition: Impulsive  Perception: Appears intact  Perseveration: No perseveration noted  Safety/Judgement: Fall prevention   PAIN PAIN   Pain Scale 1: Numeric (0 - 10)  Pain Intensity 1: 0  Pain Onset 1: acute  Pain Location 1: Head  Pain Orientation 1: Mid  Pain Description 1: Aching  Pain Intervention(s) 1: Medication (see MAR)  Patient Stated Pain Goal: 0  Pain Reassessment 1: Other (comment)  Additional Pain Sites:  (no) Pain Scale 1: Numeric (0 - 10)  Pain Intensity 1: 0  Pain Onset 1: 0  Pain Location 1: Head  Pain Orientation 1: Mid  Pain Description 1: Aching  Pain Intervention(s) 1: Medication (see MAR)  Patient Stated Pain Goal: 0  Pain Reassessment 1: Yes  Additional Pain Sites:  (no)   GROSS ASSESSMENT GROSS ASSESSMENT   AROM: Generally decreased, functional  PROM: Generally decreased, functional  Strength: Generally decreased, functional (RLE grossly 3+/5, LLE demonstrates 3/5)  Coordination: Generally decreased, functional  Tone: Normal  Sensation: Intact (BUEs,shoulder flexion 90 degrees, elbow WNL) AROM: Generally decreased, functional (BUEs,shoulder flexion 90 degrees, elbow WNL)  PROM: Generally decreased, functional (BUEs,shoulder flexion 110 degrees, elbow WNL)  Strength: Generally decreased, functional (BUEs 3+/5 within range)  Coordination: Within functional limits (BUEs,shoulder flexion 90 degrees, elbow WNL)  Tone:  (BUEs,shoulder flexion 90 degrees, elbow WNL)  Sensation: Intact (BUEs,shoulder flexion 90 degrees, elbow WNL)   BED MOBILITY BED MOBILITY   Rolling: Contact guard assistance  Supine to Sit: Contact guard assistance  Sit to Supine: Minimum assistance (elevating LLE)  Scooting: Contact guard assistance Rolling: Contact guard assistance  Supine to Sit: Stand-by assistance, Additional time  Sit to Supine: Minimum assistance (to elevate LLE)  Scooting: Stand-by assistance, Additional time   GAIT GAIT   Base of Support: Center of gravity altered  Speed/Tisha: Slow  Step Length: Left shortened, Right shortened  Gait Abnormalities: Decreased step clearance  Ambulation - Level of Assistance: Contact guard assistance  Distance (ft): 18 Feet (ft) (12)  Assistive Device: Gait belt, Walker, rolling  Rail Use: Both  Stairs - Level of Assistance: Minimum assistance, Assist X2  Number of Stairs Trained: 3 (4\" steps )  Interventions: Safety awareness training, Verbal cues, Tactile cues Base of Support: Center of gravity altered  Speed/Tisha: Slow  Step Length: Right shortened, Left shortened  Gait Abnormalities: Decreased step clearance  Ambulation - Level of Assistance: Contact guard assistance, Stand-by assistance  Distance (ft): 125 Feet (ft) (x2)  Assistive Device: Gait belt, Walker, rolling  Rail Use: Both  Stairs - Level of Assistance: Minimum assistance, Assist X2  Number of Stairs Trained: 3 (4\" steps )  Interventions: Safety awareness training, Verbal cues   Exceptions to WDL Exceptions to WDL               TRANSFERS TRANSFERS   Sit to Stand: Contact guard assistance  Stand to Sit: Contact guard assistance  Bed to Chair: Contact guard assistance (w/ RW) Sit to Stand: Stand-by assistance  Stand to Sit: Stand-by assistance  Bed to Chair: Contact guard assistance (w/c<>toilet with use of grab bar )   BALANCE BALANCE   Sitting: Without support  Sitting - Static: Good (unsupported)  Sitting - Dynamic: Fair (occasional) (+)  Standing: With support  Standing - Static: Fair (+)  Standing - Dynamic : Fair Sitting: With support  Sitting - Static: Good (unsupported)  Sitting - Dynamic: Fair (occasional) (((+)))  Standing: With support  Standing - Static: Fair  Standing - Dynamic : Fair   WHEELCHAIR MOBILITY/MGMT WHEELCHAIR MOBILITY/MGMT         Activity Tolerance:  Fair Activity Tolerance: Fair   Visual/Perceptual          Visual/Perceptual            Auditory:   Auditory Impairment: Hard of hearing, bilateral    Auditory:   Auditory  Auditory Impairment: Hard of hearing, left side       Steps: both   Clinical Decision making:  [place] standing balance score: 3 Clinical Decision making:  [place] standing balance score: 3     Treatment:   Pt sitting in bedside chair. Pt agreeable to therapy session. Gait training with 2 trails of 125ft with RW and SBA. Pt with cues for posture, proximity to RW and step length. Static standing balance with 1 to intermittent no UE support x7'30\" with (S) and no noted LOB. Pt reported L knee discomfort with prolonged standing. Pt declined stair training and reported having fear of stairs. Pt stated \"I'm not ready for that\". Seated LAQ, hip flexion on R LE x10, unable to complete more than ~5 on L LE due to c/o of L knee pain 4/10. Patient's response to treatment rendered:  Fair ((+))    Patient expected Discharge Location:  []Private Residence  [] EMERSON/ILF  []LTC  [x]Other: TBD    Plan: Continue Skilled PT services as established by the Plan of Care for 3-6 times a week. PT and Assistant have had a weekly case conference regarding the above treatment:  [x] Yes     [] No       Treatment session:  48 minutes. Therapist: Chago Laurent, PTA       Date:8/13/2018  Forward to PT for co-signature when completed.

## 2018-08-13 NOTE — ROUTINE PROCESS
Bedside and verbal shift report given to JOSIAH Mora LPN (oncoming nurse) by MARGIE Ascencio LPN (off going nurse). Report included SBAR, MAR and Kardex.

## 2018-08-13 NOTE — PROGRESS NOTES
conducted a Follow up consultation and Spiritual Assessment for Valeria Garcia, who is a 80 y.o.,female. The  provided the following Interventions:  Continued the relationship of care and support. Listened empathically. Offered prayer and assurance of continued prayer on patients behalf. Chart reviewed. The following outcomes were achieved:  Patient expressed gratitude for pastoral care visit. Assessment:  There are no further spiritual or Adventist issues which require Spiritual Care Services interventions at this time. Plan:  Chaplains will continue to follow and will provide pastoral care on an as needed/requested basis.  recommends bedside caregivers page  on duty if patient shows signs of acute spiritual or emotional distress.      [address] [facility]   Staff [address] [facility]   [phone]

## 2018-08-13 NOTE — PROGRESS NOTES
Nutrition follow-up/Plan of care [facility]     RECOMMENDATIONS:     1. Cardiac Diet  2. Monitor weight, labs and PO intake  3. RD to follow     GOALS:     1. Ongoing: PO intake meets >75% of protein/calorie needs by 8/20  2. Ongoing: Weight Maintenance (+/- 1-2 lb) by 8/20      ASSESSMENT:     Weight status is classified as normal per BMI of 20.6 However, patient is at nutrition risk due to BMI below 23 with patient above 72years of age. PO intake is adequate. Labs noted. No new labs. Nutrition recommendations listed. RD to follow. Nutrition Risk:  [] High  [] Moderate [x]  Low    SUBJECTIVE/OBJECTIVE:      Pt transferred from  to [facility] on 8/4/2018 after being admitted with syncope and collapse. Patient with wound (left elbow, bruise to forehead, lower back) from fall. Patient reports having a good appetite and eating all of meals. States weight has been stable at 121 lb. Denies food allergies and problems with chewing/swallowing. State she is usually regular with her BM and has not had one today so will order prune juice on AM trays. Last BM (8/3). Will monitor. (8/13): Pt seen in room after her PT session in the gym. Observed 90% of lunch tray consumed. Pt reports having a good appetite and that things are good. Encouraged intake and will monitor.      Information Obtained from:    [x] Chart Review   [x] Patient   [] Family/Caregiver   [] Nurse/Physician   [] Interdisciplinary Meeting/Rounds    Diet: Cardiac Diet  Medications: [x] Reviewed    Allergies: [x] Reviewed   Patient Active Problem List   Diagnosis Code    Polymyalgia rheumatica (HCC) M35.3    Altered mental status R41.82    CAD (coronary artery disease) I25.10    Hypomagnesemia E83.42    Syncope R55    Hypokalemia E87.6    HTN (hypertension) I10    Knee pain, left M25.562    Weakness R53.1       Past Medical History:   Diagnosis Date    Arthritis     CAD (coronary artery disease) 2001    3 coronary stents    Cardiac complication     Hypertension     Migraine 3/1/2015    Polymyalgia rheumatica ([facility] [address] 2/28/2015      Labs:    Lab Results   Component Value Date/Time    Sodium 133 (L) 08/05/2018 01:54 PM    Potassium 3.6 08/05/2018 01:54 PM    Chloride 93 (L) 08/05/2018 01:54 PM    CO2 30 08/05/2018 01:54 PM    Anion gap 10 08/05/2018 01:54 PM    Glucose 130 (H) 08/05/2018 01:54 PM    BUN 29 (H) 08/05/2018 01:54 PM    Creatinine 0.89 08/05/2018 01:54 PM    Calcium 9.9 08/05/2018 01:54 PM    Magnesium 2.1 08/01/2018 06:50 AM    Albumin 3.5 07/31/2018 08:12 PM     Anthropometrics: BMI (calculated): 20.6  Last 3 Recorded Weights in this Encounter    08/04/18 2145   Weight: 56.1 kg (123 lb 9.6 oz)      Ht Readings from Last 1 Encounters:   07/31/18 5' 5\" (1.651 m)     Weight Metrics 8/4/2018 8/4/2018 3/5/2015 2/20/2013 2/17/2013   Weight 123 lb 9.6 oz 126 lb 153 lb 163 lb 11.2 oz 178 lb 1.6 oz   BMI 20.57 kg/m2 20.97 kg/m2 25.46 kg/m2 30.95 kg/m2 30.56 kg/m2       No data found.     UBW: 121 lb  [] Weight Loss [] Weight Gain [x] Weight Stable    Estimated Nutrition Needs: [x] [facility]    Calories: 4747-4774 Kcal Based on:   [x] Actual BW    Protein:   65 g Based on:   [x] Actual BW    Fluid:       1500 ml Based on:   [x] Actual BW     Nutrition Problems Identified:   [] Suboptimal PO intake   [] Food Allergies  [] Difficulty chewing/swallowing/poor dentition  [] Constipation/Diarrhea   [] Nausea/Vomiting   [x] None  [] Other:     Plan:   [x] Therapeutic Diet  []  Obtained/adjusted food preferences/tolerances and/or snacks options   []  Supplements added   [] Occupational therapy following for feeding techniques  []  HS snack added   []  Modify diet texture   []  Modify diet for food allergies   []  Assist with menu selection   [x]  Monitor PO intake on meal rounds   [x]  Continue inpatient monitoring and intervention   [x]  Participated in discharge planning/Interdisciplinary rounds/Team meetings   []  Other:     Education Needs:   [] Not appropriate for teaching at this time due to:   [x] Identified and addressed    Nutrition Monitoring and Evaluation:  [x] Continue ongoing monitoring and intervention  [] Other    Solange Pallas

## 2018-08-13 NOTE — PROGRESS NOTES
Problem: Self Care Deficits Care Plan (Adult)  Goal: *Acute Goals and Plan of Care (Insert Text)  OCCUPATIONAL THERAPY SHORT TERM GOALS      1. Patient will perform Upper body ADL's without adaptive equipment with standby assist.  2.  Patient will perform Lower body ADL's with adaptive equipment as needed with contact guard assist .  3. Patient will perform toileting task with Mod I with Good safety to reduce falls risk. 4.  Patient will perform toilet transfers with Rolling Walker and Supv.  5.  Patient will perform functional task in standing for 8 minutes with Good static/dynamic balance for improved ADL/IADL function with standby assist and Good safety awareness. 6.  Patient will improve Barthel index scores to atleast 65/100 to improve functional mobility. 7.  Patient will utilize energy conservation techniques during functional activities with minimal verbal cues. Updated 8/13/18    1. Patient will perform Upper body ADL's without adaptive equipment with standby assist.-CLOF: bathing w/ CGA , dressing w/ Mod I  2. Patient will perform Lower body ADL's with adaptive equipment as needed with minimal assistance/contact guard assist. - CLOF: bathing w/ Min A , dressing w/ Min A, upgrade  3. Patient will perform toileting task with standby assist with Good safety to reduce falls risk. -CLOF: supv, upgrade  4. Patient will perform toilet transfers with [place] and standby assist.-CLOF: SBA, upgrade  5. Patient will perform functional task in standing for 5 minutes with Good static/dynamic balance for improved ADL/IADL function with standby assist and Good safety awareness. -CLOF:  Fair+ static, Fair dynamic standing balance w/ RW x 5 mins w/ SBA - CGA, upgrade  6. Patient will improve Barthel index scores to atleast 65/100 to improve functional mobility. -CLOF: 55/100  7. Patient will utilize energy conservation techniques during functional activities with minimal verbal cues. -CLOF: progressing w/ min verbal cues    Initiated 8/6/2018 and to be accomplished within 2 Week(s)    1. Patient will perform Upper body ADL's without adaptive equipment with standby assist.  2.  Patient will perform Lower body ADL's with adaptive equipment as needed with minimal assistance/contact guard assist .  3. Patient will perform toileting task with standby assist with Good safety to reduce falls risk. 4.  Patient will perform toilet transfers with Maybelle Fuse and standby assist..  5.  Patient will perform functional task in standing for 5 minutes with Good static/dynamic balance for improved ADL/IADL function with standby assist and Good safety awareness. 6.  Patient will improve Barthel index scores to atleast 65/100 to improve functional mobility. 7.  Patient will utilize energy conservation techniques during functional activities with minimal verbal cues. OCCUPATIONAL THERAPY LONG TERM GOALS   Initiated 8/6/2018 and to be accomplished within 4 Week(s)    1. Patient will perform ADL's & IADLs with adaptive equipment as needed with modified independence. 2.  Patient will perform toileting task with modified independence with Good safety to reduce falls risk. 3.  Patient will perform all functional transfers utilizing least restrictive device and durable medical equipment as needed with modified independence and Good balance and safety awareness. 4.  Patient will perform standing static/dynamic activity for improved ADL/IADL function with modified independence and Good balance and safety awareness. 5.  Patient will improve Barthel index score to 95/100 to improve independence with mobility. 6. Patient will perform home making tasks and simple meal prep Mod I utilizing energy conservation techniques and good safety awareness.       Therapist: Mandie Sheth    8/6/2018          [facility]  OCCUPATIONAL THERAPY WEEKLY SUMMARY   Reporting period:  from 8/6/18 through 8/13/18       Patient: Meg Mata (3 y.o. female)   Date: 8/13/2018    Primary Diagnosis: syncope       Attending Physician: Amanda Rodriguez MD Treatment Diagnosis  Treatment Diagnosis: muscle weakness  Treatment Diagnosis 2: increased need for assist with self care  Precautions: Fall  Rehab Potential : Good    Skill interventions and education provided with clinical rationale (include individualized treatment techniques and standardized tests):  Skilled Occupational services were provided utilizing ADL retraining, instruction on adaptive equipment training, safety awareness and energy conservation techniques incorporating balance retraining & UE ROM techniques, therapeutic exercise and activities incorporating strengthening in order to improve ADL performance skills and functional mobility. Patient has made progress, continue OT skilled services in order for patient to reach maximal functional potential towards goals for safe d/c home. Using a comparative statement, summarize significant progress toward goals as a result of skilled intervention provided:  Patient has made Good progress towards their Occupational Therapy goals in the following areas: grooming, bathing, upper body dressing, lower body dressing, toileting and toilet transfer using RW, raised toilet seat, grab bar and w/c. Identify remaining functional areas, impairments limiting progress and/or barriers to improvement:  Patient would benefit from continued skilled Occupational Therapy Services to address the following functional deficits in balance, safety awareness, strength, coordination, ROM and functional activity tolerance in order to improve ADL performance skills and functional transfers for self-care tasks.       OBJECTIVE DATA SUMMARY:       INITIAL ASSESSMENT WEEKLY PROGRESS   COGNITIVE STATUS: COGNITIVE STATUS:   Neurologic State: Alert, Appropriate for age  Orientation Level: Oriented X4  Cognition: Appropriate for age attention/concentration  Perception: Appears intact  Perseveration: No perseveration noted  Safety/Judgement: Fall prevention Neurologic State: Alert, Confused  Orientation Level: Oriented to person  Cognition: Impulsive  Perception: Appears intact  Perseveration: No perseveration noted  Safety/Judgement: Fall prevention   PAIN: PAIN:   Pain Scale 1: Numeric (0 - 10)  Pain Intensity 1: 0  Pain Onset 1: acute  Pain Location 1: Head  Pain Orientation 1: Mid  Pain Description 1: Aching  Pain Intervention(s) 1: Medication (see MAR)  Patient Stated Pain Goal: 0  Pain Reassessment 1: Other (comment)  Additional Pain Sites:  (no)     Pain Scale 1: Numeric (0 - 10)  Pain Intensity 1: 0  Pain Onset 1: 0  Pain Location 1: Head  Pain Orientation 1: Mid  Pain Description 1: Aching  Pain Intervention(s) 1: Medication (see MAR)  Patient Stated Pain Goal: 0  Pain Reassessment 1: Yes  Additional Pain Sites:  (no)   BED MOBILITY BED MOBILITY   Rolling: Contact guard assistance  Supine to Sit: Contact guard assistance  Sit to Supine: Minimum assistance (elevating LLE)  Scooting: Contact guard assistance Rolling: Contact guard assistance  Supine to Sit: Stand-by assistance, Additional time  Sit to Supine: Minimum assistance (elevating LLE)  Scooting: Stand-by assistance, Additional time   ADL SELF CARE ADL SELF CARE   Oral Facial Hygiene/Grooming: Setup  Type of Bath: Basin/Soap/Water  Lower Body Dressing: Minimum assistance (donning B LE's through pantholes. )  Toileting: Contact guard assistance Bathing Assistance: Contact guard assistance    Dressing Assistance: Stand-by assistance  Pullover Shirt: Modified independent, Supervision/set-up    Bathing Assistance: Minimum assistance  Lower Body : Maximum assistance    Toileting Assistance: Supervision/set up  Bladder Hygiene: Supervision/set-up  Bowel Hygiene: Supervision/set-up  Clothing Management: Supervision/set-up  Adaptive Equipment: Grab bars, Elevated seat, Walker    Grooming Assistance: Stand-by assistance  Brushing Teeth: Supervision/set-up  Brushing/Combing Hair: Supervision/set-up    Dressing Assistance: Moderate assistance  Protective Undergarmet: Minimum assistance  Pants With Elastic Waist: Minimum assistance  Socks:  Moderate assistance  Adaptive Equipment Used: Sock aid, Reacher    Feeding Assistance: Modified independent   TRANSFERS TRANSFERS   Sit to Stand: Contact guard assistance  Stand to Sit: Contact guard assistance  Bed to Chair: Contact guard assistance (w/ RW)  Toilet Transfer : Contact guard assistance Sit to Stand: Stand-by assistance  Stand to Sit: Contact guard assistance  Bed to Chair: Contact guard assistance (w/c<>toilet with use of grab bar )  Toilet Transfer : Stand-by assistance (w/ RW)   Toilet Transfer : Contact guard assistance  Adaptive Equipment: Walker (comment) Toilet Transfer : Stand-by assistance (w/ RW)  Adaptive Equipment: Walker (comment)   BALANCE BALANCE   Sitting: Without support  Sitting - Static: Good (unsupported)  Sitting - Dynamic: Fair (occasional) (+)  Standing: With support  Standing - Static: Fair (+)  Standing - Dynamic : Fair Sitting: Without support  Sitting - Static: Good (unsupported)  Sitting - Dynamic: Fair (occasional) (+)  Standing: With support  Standing - Static: Fair  Standing - Dynamic : Fair       GROSS ASSESSMENT  GROSS ASSESSMENT   AROM: Generally decreased, functional  PROM: Generally decreased, functional  Strength: Generally decreased, functional (RLE grossly 3+/5, LLE demonstrates 3/5)  Coordination: Generally decreased, functional  Tone: Normal  Sensation: Intact (BUEs,shoulder flexion 90 degrees, elbow WNL) AROM: Generally decreased, functional (BUEs,shoulder flexion 90 degrees, elbow WNL)  PROM: Generally decreased, functional (BUEs,shoulder flexion 110 degrees, elbow WNL)  Strength: Generally decreased, functional (BUEs 3+/5 within range)  Coordination: Within functional limits (BUEs,shoulder flexion 90 degrees, elbow WNL)  Tone:  (BUEs,shoulder flexion 90 degrees, elbow WNL)  Sensation: Intact (BUEs,shoulder flexion 90 degrees, elbow WNL)   COORDINATION COORDINATION   Fine Motor Skills-Upper: Left Intact, Right Intact  Gross Motor Skills-Upper: Left Intact, Right Intact Fine Motor Skills-Upper: Left Intact, Right Intact  Gross Motor Skills-Upper: Left Intact, Right Intact   VISUAL/PERCEPTUAL VISUAL/PERCEPTUAL             AUDITORY: AUDITORY:   Auditory Impairment: Hard of hearing, bilateral Auditory Impairment: Hard of hearing, left side       INSTRUMENTAL  ADL'S:    INSTRUMENTAL ADL'S:          THE BARTHEL INDEX  ACTIVITY   SCORE   FEEDING  0=unable  5=needs help cutting,spreading butter,etc., or modified diet  10= independent   10     BATHING  0=dependent  5=independent (or in shower   0   GROOMING  0=needs help  5=independent face/hair/teeth/shaving (implements provided)   5   DRESSING  0=dependent  5=needs help but can do about half unaided  10=independent(including buttons, zips,laces etc.)   5   BOWELS  0=incontinent  5=occasional accident  10=continent   10   BLADDER  0=incontinent, or catheterized and unable to manage alone  5=occasional accident  10=continent   10   TOILET USE  0=dependent  5=needs some help, but can do something alone  10=independent (on and off, dressing, wiping)   5   TRANSFER (BED TO CHAIR AND BACK)  0=unable, no sitting balance  5=major help(one or two people,physical), can sit  10=minor help(verbal or physical)  15=independent   10   MOBILITY (ON LEVEL SURFACES)  0=immobile or <50 yards  5=wheelchair independent,including corners,>50 yards  10=walkes with help of one person (verbal or physical) >50 yards  15=independent(but may use any aid; for example, stick) >50 yards   0   STAIRS  0=unable  5=needs help (verbal, physical, carrying aid)  10=independent   0            TOTAL:                  55/100     Treatment:  Toilet transfer: SBA w/ RW, toilet tasks w/ Supv, patient washed hands in stance w/ RW at sink w/ SBA. Medicine management: Simulated medicine management utilizing weekly pill organizer x 5 meds w/ Supv and 90% accuracy. Patient reports at [facility], she utilized weekly pill organizer. Functional transfer: w/c->edge of bed using RW w/ SBA, Min A to elevate LLE into bed to lay supine for rest, call bell within reach w/ patient verbalizing understanding to call for assist for functional transfers in order to prevent falls. Patient's response to Treatment rendered:    Patient participated in review of O.T. plan of care and progress towards goals w/ completion of weekly progress note for client centered approach. Patient expected Discharge Location:  []Private Residence  [] shelter/ILF  []LTC  [x]Other: TBD I.e. Private residence versus [facility]    Plan: Continue OT services as established on the [address] [place] [address] for 3-6 times a week.     Treatment Minutes:    OT and Assistant have had a weekly case conference regarding the above treatment:  [x] Yes     [] No    Therapist:   Jose A Bahena,         Date:8/13/2018     Forward to OT for co-signature when completed

## 2018-08-13 NOTE — ROUTINE PROCESS
Bedside and Verbal shift change report given to Anabelle Elkins LPN (oncoming nurse) by Kaitlin Cleaning LPN  (offgoing nurse). Report included the following information SBAR, Kardex, MAR and Recent Results.

## 2018-08-13 NOTE — PROGRESS NOTES
I have reviewed this patient's current medication list and recent laboratory results. At this time, I do not suggest any drug therapy adjustments or additional laboratory monitoring. Thank you,  Jhonatan TRUONG  Ph. M. S.  8/13/2018

## 2018-08-14 PROCEDURE — 74011636637 HC RX REV CODE- 636/637: Performed by: INTERNAL MEDICINE

## 2018-08-14 PROCEDURE — 74011250637 HC RX REV CODE- 250/637: Performed by: INTERNAL MEDICINE

## 2018-08-14 RX ADMIN — ATENOLOL 50 MG: 50 TABLET ORAL at 08:09

## 2018-08-14 RX ADMIN — PANTOPRAZOLE SODIUM 40 MG: 40 TABLET, DELAYED RELEASE ORAL at 08:10

## 2018-08-14 RX ADMIN — POLYETHYLENE GLYCOL 3350 17 G: 17 POWDER, FOR SOLUTION ORAL at 21:55

## 2018-08-14 RX ADMIN — DOCUSATE SODIUM 100 MG: 100 CAPSULE, LIQUID FILLED ORAL at 08:09

## 2018-08-14 RX ADMIN — SIMVASTATIN 40 MG: 40 TABLET, FILM COATED ORAL at 21:55

## 2018-08-14 RX ADMIN — ATENOLOL 50 MG: 50 TABLET ORAL at 21:55

## 2018-08-14 RX ADMIN — PREDNISONE 5 MG: 5 TABLET ORAL at 08:10

## 2018-08-14 RX ADMIN — LOSARTAN POTASSIUM 100 MG: 50 TABLET ORAL at 08:09

## 2018-08-14 RX ADMIN — DOCUSATE SODIUM 100 MG: 100 CAPSULE, LIQUID FILLED ORAL at 19:26

## 2018-08-14 RX ADMIN — HYDROCHLOROTHIAZIDE 25 MG: 25 TABLET ORAL at 08:10

## 2018-08-14 RX ADMIN — ACETAMINOPHEN 650 MG: 325 TABLET, FILM COATED ORAL at 19:26

## 2018-08-14 RX ADMIN — ASPIRIN 81 MG 81 MG: 81 TABLET ORAL at 08:10

## 2018-08-14 NOTE — ROUTINE PROCESS
Bedside and Verbal shift change report given to Damon Wolf LPN  (oncoming nurse) by Joaquin Brady LPN (offgoing nurse). Report included the following information SBAR, Kardex and MAR.

## 2018-08-14 NOTE — ROUTINE PROCESS
Bedside and Verbal shift change report given to Shabana Colunga RN (oncoming nurse) by Dale Bynum LPN  (offgoing nurse). Report included the following information SBAR, Kardex, MAR and Recent Results.

## 2018-08-14 NOTE — PROGRESS NOTES
Pt was seen today by  from [facility] SW received a message from Prakash requesting that SW call her back re: pt. Gini Mortimer called caregiver Ginger Bagley to inform her that pt was seen today by [facility]. She informed SW that pt told her son she wasn't interested in a facility over $ 2,000 per month. SW agreed to leave brochures in pt's room again re: private duty, life alert and Assisted Living. Caregiver informed SW that she will talk to pt's son at 5:00pm today re: suggestions. SW will schedule a care plan meeting at a later date.

## 2018-08-14 NOTE — PROGRESS NOTES
Problem: Self Care Deficits Care Plan (Adult)  Goal: *Acute Goals and Plan of Care (Insert Text)  OCCUPATIONAL THERAPY SHORT TERM GOALS      1. Patient will perform Upper body ADL's without adaptive equipment with standby assist.  2.  Patient will perform Lower body ADL's with adaptive equipment as needed with contact guard assist .  3. Patient will perform toileting task with Mod I with Good safety to reduce falls risk. 4.  Patient will perform toilet transfers with Rolling Walker and Supv.  5.  Patient will perform functional task in standing for 8 minutes with Good static/dynamic balance for improved ADL/IADL function with standby assist and Good safety awareness. 6.  Patient will improve Barthel index scores to atleast 65/100 to improve functional mobility. 7.  Patient will utilize energy conservation techniques during functional activities with minimal verbal cues. Updated 8/13/18    1. Patient will perform Upper body ADL's without adaptive equipment with standby assist.-CLOF: bathing w/ CGA , dressing w/ Mod I  2. Patient will perform Lower body ADL's with adaptive equipment as needed with minimal assistance/contact guard assist. - CLOF: bathing w/ Min A , dressing w/ Min A, upgrade  3. Patient will perform toileting task with standby assist with Good safety to reduce falls risk. -CLOF: supv, upgrade  4. Patient will perform toilet transfers with [employer] and standby assist.-CLOF: SBA, upgrade  5. Patient will perform functional task in standing for 5 minutes with Good static/dynamic balance for improved ADL/IADL function with standby assist and Good safety awareness. -CLOF:  Fair+ static, Fair dynamic standing balance w/ RW x 5 mins w/ SBA - CGA, upgrade  6. Patient will improve Barthel index scores to atleast 65/100 to improve functional mobility. -CLOF: 55/100  7. Patient will utilize energy conservation techniques during functional activities with minimal verbal cues. -CLOF: progressing w/ min verbal cues    Initiated 8/6/2018 and to be accomplished within 2 Week(s)    1. Patient will perform Upper body ADL's without adaptive equipment with standby assist.  2.  Patient will perform Lower body ADL's with adaptive equipment as needed with minimal assistance/contact guard assist .  3. Patient will perform toileting task with standby assist with Good safety to reduce falls risk. 4.  Patient will perform toilet transfers with Janeth Tesfaye and standby assist..  5.  Patient will perform functional task in standing for 5 minutes with Good static/dynamic balance for improved ADL/IADL function with standby assist and Good safety awareness. 6.  Patient will improve Barthel index scores to atleast 65/100 to improve functional mobility. 7.  Patient will utilize energy conservation techniques during functional activities with minimal verbal cues. OCCUPATIONAL THERAPY LONG TERM GOALS   Initiated 8/6/2018 and to be accomplished within 4 Week(s)    1. Patient will perform ADL's & IADLs with adaptive equipment as needed with modified independence. 2.  Patient will perform toileting task with modified independence with Good safety to reduce falls risk. 3.  Patient will perform all functional transfers utilizing least restrictive device and durable medical equipment as needed with modified independence and Good balance and safety awareness. 4.  Patient will perform standing static/dynamic activity for improved ADL/IADL function with modified independence and Good balance and safety awareness. 5.  Patient will improve Barthel index score to 95/100 to improve independence with mobility. 6. Patient will perform home making tasks and simple meal prep Mod I utilizing energy conservation techniques and good safety awareness.       Therapist: Deion Pace    8/6/2018            [facility]   Occupational Therapy Daily Treatment note      Patient: Annmarie Byrd (6 y.o. female) Date: 8/14/2018  Attending Physician: Renetta Villalba MD  Primary Diagnosis: syncope    Treatment Diagnosis  Treatment Diagnosis: muscle weakness  Treatment Diagnosis 2: increased need for assist with self care   Precautions : Precautions at Admission: Fall  Vital Signs:  Vital Signs  Temp: 97.7 °F (36.5 °C)  Temp Source: Oral  Pulse (Heart Rate): 89  Resp Rate: 18  O2 Sat (%): 100 %  BP: 174/64  MAP (Calculated): 101  BP 1 Method: Automatic  BP 1 Location: Right arm  BP Patient Position: At rest     Cognitive Status:  Mental Status  Neurologic State: Alert;Confused  Orientation Level: Oriented to person;Oriented to situation  Cognition: Decreased attention/concentration  Pain:  Pain Screen  Pain Scale 1: Numeric (0 - 10)  Pain Intensity 1: 0  Patient Stated Pain Goal: 0  Pain Reassessment 1: Yes  Pain Scale 1: Numeric (0 - 10)  Gross Assessment:     Coordination:     Bed Mobility:  Bed Mobility  Supine to Sit: Contact guard assistance  Sit to Supine: Minimum assistance  Transfers:  Functional Transfers  Sit to Stand: Contact guard assistance  Toilet Transfer : Contact guard assistance (from w/c level)  Functional Transfers  Toilet Transfer : Contact guard assistance (from w/c level)  Adaptive Equipment: Grab bars  Balance:  Balance  Sitting: Without support  Sitting - Static: Fair (occasional)  Sitting - Dynamic: Fair (occasional)  Standing: With support  Standing - Static: Fair  Standing - Dynamic : Fair (-)  ADL Self Care:     ADL Intervention:           Toileting  Toileting Assistance: Contact guard assistance  Bladder Hygiene: Contact guard assistance  Bowel Hygiene: Contact guard assistance  Clothing Management: Contact guard assistance  Adaptive Equipment: Walker;Grab bars;Elevated seat  Grooming  Washing Hands: Stand-by assistance           Therapeutic Activities:  Patient presents lying supine in bed w/ HOB elevated, c/o fatigue, vitals /38, HR 60 & O2 @ %-nursing/Jocelyn notified and reports improvement since earlier vital w/ BP elevated and meds administered w/ recommendation for patient to increase H20 intake and assist w/ functional mobility e.g. Up into w/c. Co-tx with P.T. To maximize functional potential w/ bed mobility, balance, safety awareness, functional mobility and activity tolerance to improve ADL performance skills, Bed mobility: supine to sit eob w/ Vitals taken following while sitting eob: /46, HR 63 & O2 @ %, patient requested toileting and declined use of RW for toilet transfer 2/2 fatigue, assist from w/c level using RW from w/c<-> toilet w/ CGA and washing hands seated w/c level at sink w/ SBA & vitals taken following multiple transfers: /48, HR 62 & O2 @ %, following seated rest break: /46, HR 60 & O2 @ %- patient agreeable to participate in leisure interests of choosing from various teas and served tea of her choice, assist to locate a book to read from activities bookshelf, patient requested to remain sitting up in w/c to drink tea and read book, although vitals:BP79/43, HR 62 & O2 @ %, patient assisted back to supine in bed w/ HOB slightly elevated per patient request w/ vitals improved to /51, HR 59 & O2 @ %. Patient with call bell within reach. Second tx session: patient noted to be ambulating in room w/ RW without calling for assist, patient presents with shirt and pants over RW handle and appears agitated when therapist inquired why she was ambulating w/o assist w/ RW, patient compliant and took seat on edge of bed and agreeable for therapist to notify nursing of patient need for assist-Nursing/[facility] notified w/ recommendation for bed alarm 2/2 patient's poor safety awareness. Patient/Caregiver Education:     Pt.  Instructed on safety awareness with importance to utilize call bell to request assist with functional transfers to prevent falls, patient verbalized understanding and provided accurate demonstration.     ASSESSMENT:  Patient continues to demonstrate the need for skilled Occupational Therapy services to improve grooming, bathing, upper body dressing, lower body dressing, toileting, toilet transfer, shower transfer and simple home management  Progression toward goals:  []      Improving appropriately and progressing toward goals  [x]      Improving slowly and progressing toward goals  []      Not making progress toward goals and plan of care will be adjusted     Treatment session:   56 minutes    Therapist:    Felicity Gil,  8/14/2018

## 2018-08-14 NOTE — ROUTINE PROCESS
Bedside and Verbal shift change report given to FANG Clinton LPN (oncoming nurse) by Nilam Rogers RN (offgoing nurse). Report included the following information SBAR, Kardex and MAR. Qh  Visual checks and 24h chart checks done.

## 2018-08-15 PROCEDURE — 74011636637 HC RX REV CODE- 636/637: Performed by: INTERNAL MEDICINE

## 2018-08-15 PROCEDURE — 74011000250 HC RX REV CODE- 250: Performed by: INTERNAL MEDICINE

## 2018-08-15 PROCEDURE — 74011250637 HC RX REV CODE- 250/637: Performed by: INTERNAL MEDICINE

## 2018-08-15 RX ADMIN — ASPIRIN 81 MG 81 MG: 81 TABLET ORAL at 09:36

## 2018-08-15 RX ADMIN — PREDNISONE 5 MG: 5 TABLET ORAL at 09:36

## 2018-08-15 RX ADMIN — SIMVASTATIN 40 MG: 40 TABLET, FILM COATED ORAL at 21:47

## 2018-08-15 RX ADMIN — DOCUSATE SODIUM 100 MG: 100 CAPSULE, LIQUID FILLED ORAL at 17:24

## 2018-08-15 RX ADMIN — ATENOLOL 50 MG: 50 TABLET ORAL at 21:47

## 2018-08-15 RX ADMIN — HYDROCHLOROTHIAZIDE 25 MG: 25 TABLET ORAL at 09:36

## 2018-08-15 RX ADMIN — LOSARTAN POTASSIUM 100 MG: 50 TABLET ORAL at 09:36

## 2018-08-15 RX ADMIN — ATENOLOL 50 MG: 50 TABLET ORAL at 09:36

## 2018-08-15 RX ADMIN — DOCUSATE SODIUM 100 MG: 100 CAPSULE, LIQUID FILLED ORAL at 09:36

## 2018-08-15 RX ADMIN — PANTOPRAZOLE SODIUM 40 MG: 40 TABLET, DELAYED RELEASE ORAL at 09:36

## 2018-08-15 RX ADMIN — POLYETHYLENE GLYCOL 3350 17 G: 17 POWDER, FOR SOLUTION ORAL at 21:49

## 2018-08-15 NOTE — PROGRESS NOTES
Problem: Mobility Impaired (Adult and Pediatric)  Goal: *Acute Goals and Plan of Care (Insert Text)  PHYSICAL THERAPY STG GOALS :  Initiated 8/6/2018 and to be accomplished within 2 Weeks (Updated 8/13/18)     1. Patient will move from supine to sit and sit to supine  and roll side to side in bed with supervision/set-up. (Progressing; SBA)     2.  Patient will transfer from bed to chair and chair to bed with close supervision/set-up using RW. (Progressing; SBA)   3. Patient will perform sit to stand with close supervision/set-up with Good balance and safety awareness. (Progressing; SBA)   4. Patient will ambulate with stand by assistance for 100 feet with RW on level surfaces with 2 turns. (Achieved)   5. Patient will ascend/descend 5 stairs with left handrail(s) with contact guard assistance to allow for safe home access/exit. (Progressing; 3 4\" steps with Min A x2 and B HR)   6. Patient will improve standardized test score for [place] Standing Balance Scale 2+ to reduce fall risk. (Achieved)     PHYSICAL THERAPY LTG GOALS :  Initiated 8/6/2018 and to be accomplished within 4 Weeks    1. Patient will move from supine to sit and sit to supine  and roll side to side in bed with modified independence. 2.  Patient will transfer from bed to chair and chair to bed with modified independence using RW/rollator. 3.  Patient will perform sit to stand with modified independence with Good balance and safety awareness. 4.  Patient will ambulate with supervision/set-up for 200 feet with RW/rollator on level surfaces and be able to maneuver through narrow spaces and obstacles without loss of balance. 5.  Patient will ascend/descend 5 stairs with left handrail(s) with supervision/set-up to allow for safe home access/exit. 6.  Patient will improve standardized test score for [place] Standing Balance Scale 3 to reduce fall risk.       Physical Therapist:   Leanne Hyatt, PT  on 8/6/2018              [facility]   physical Therapy Daily Treatment note      Patient: Reji Trejo (21 y.o. female)               Date: 8/15/2018    Physician: Chapis Good MD  Primary Diagnosis: syncope          Treatment Diagnosis  Treatment Diagnosis: muscle weakness  Treatment Diagnosis 2: increased need for assist with self care  Precautions: Fall  Vital Signs  Cognitive Status:  Mental Status  Neurologic State: Alert;Confused  Orientation Level: Oriented to person;Oriented to place  Cognition: Decreased attention/concentration; Impaired decision making  Pain  Pain Screen  Pain Scale 1: Visual  Pain Intensity 1: 0  Patient Stated Pain Goal: 0  Pain Reassessment 1: Patient sleeping  Bed Mobility Training  Balance  Sitting: Intact  Sitting - Static: Good (unsupported)  Sitting - Dynamic: Fair (occasional) (((+)))  Standing: With support  Standing - Static: Good  Standing - Dynamic : Fair (((+)))  Transfer Training  Transfer Training  Sit to Stand: Stand-by assistance  Stand to Sit:  (close (S) )  Sit to Stand: Stand-by assistance  Gait Training  Gait  Base of Support: Center of gravity altered  Speed/Tisha: Slow  Step Length: Right shortened;Left shortened  Gait Abnormalities: Decreased step clearance  Ambulation - Level of Assistance: Stand-by assistance  Distance (ft): 25 Feet (ft) (+12ft)  Assistive Device: Walker, rolling  Gait Abnormalities: Decreased step clearance   With 1 turns. Therapeutic Exercise: Sit<>stand x4 with SBA/close (S). Pt had one attempt of pulling up on RW, but corrected with cues. Static standing with 1 to no UE support x~6' with (S) and no noted LOB. Pt reported feeling very fatigued following standing. Pt required prolonged rest break. BP: 101/66, HR:64 bpm while seated. Gait training with x12ft to bathroom. Pt able to complete doff/donning clothing with (S) and all self care. Additional ambulation of ~20ft with RW and SBA/close (S).  Seated B LE TE's rendered to promote strength and endurance for improved functional mobility: HR/TR, LAQ, hip flexion x15 (Limited on L due to c/o of L knee arthritis). Pt required several rest breaks with TE's due to fatigue. Pt remained sitting in w/c at bedside for breakfast, call bell at side. Patient/Caregiver Education:   Pt /Caregiver Education on safety and fall prevention, and HEP. Pt reported doing a little bit when she thinks of it.  was provided to maximize functional gains. ASSESSMENT:  Patient continues to benefit from Skilled PT services to improve sit<>stand, tranfers, strength, balance, gait and stair negotiation. Progression toward goals:  [x]      Improving appropriately and progressing toward goals  []      Improving slowly and progressing toward goals  []      Not making progress toward goals and plan of care will be adjusted     Treatment session: 48 minutes.   Therapist:   Tariq Dennis PTA,          8/15/2018

## 2018-08-15 NOTE — PROGRESS NOTES
SW received a note from the  informing SW that pt needs to speak with Dr. Valentin Larios re: d/c home. SW called pt's caregiver Shelbi Cabrera re: pt's request for d/c . She informed SW that pt is upset that she hasn't seen the MD and feels that she can go home. SW informed caregiver that pt doesn't have a d/c date from therapy. SW attempted to schedule a family conference but the caregiver stated that she didn't have her calender with her and she would have to call SW back tomorrow. SW spoke with pt and is requesting to see the MD. Brandy Houston informed pt that SW faxed note to MD re: her request to see him. SW also informed pt that SW wanted to arrange a family conference on Friday with her caregiver. Pt stated that she hoped to be discharged by Friday. SW informed pt that rehab is optional and explained that she needs to have assistance at home arranged prior to her d/c.

## 2018-08-15 NOTE — ROUTINE PROCESS
Bedside and Verbal shift change report given to Barbara Yancey RN (oncoming nurse) by Sisi Jeffers LPN  (offgoing nurse). Report included the following information SBAR, Kardex, MAR and Recent Results.

## 2018-08-15 NOTE — PROGRESS NOTES
Problem: Self Care Deficits Care Plan (Adult)  Goal: *Acute Goals and Plan of Care (Insert Text)  OCCUPATIONAL THERAPY SHORT TERM GOALS      1. Patient will perform Upper body ADL's without adaptive equipment with standby assist.  2.  Patient will perform Lower body ADL's with adaptive equipment as needed with contact guard assist .  3. Patient will perform toileting task with Mod I with Good safety to reduce falls risk. 4.  Patient will perform toilet transfers with Rolling Walker and Supv.  5.  Patient will perform functional task in standing for 8 minutes with Good static/dynamic balance for improved ADL/IADL function with standby assist and Good safety awareness. 6.  Patient will improve Barthel index scores to atleast 65/100 to improve functional mobility. 7.  Patient will utilize energy conservation techniques during functional activities with minimal verbal cues. Updated 8/13/18    1. Patient will perform Upper body ADL's without adaptive equipment with standby assist.-CLOF: bathing w/ CGA , dressing w/ Mod I  2. Patient will perform Lower body ADL's with adaptive equipment as needed with minimal assistance/contact guard assist. - CLOF: bathing w/ Min A , dressing w/ Min A, upgrade  3. Patient will perform toileting task with standby assist with Good safety to reduce falls risk. -CLOF: supv, upgrade  4. Patient will perform toilet transfers with Garfield Rosalind and standby assist.-CLOF: SBA, upgrade  5. Patient will perform functional task in standing for 5 minutes with Good static/dynamic balance for improved ADL/IADL function with standby assist and Good safety awareness. -CLOF:  Fair+ static, Fair dynamic standing balance w/ RW x 5 mins w/ SBA - CGA, upgrade  6. Patient will improve Barthel index scores to atleast 65/100 to improve functional mobility. -CLOF: 55/100  7. Patient will utilize energy conservation techniques during functional activities with minimal verbal cues. -CLOF: progressing w/ min verbal cues    Initiated 8/6/2018 and to be accomplished within 2 Week(s)    1. Patient will perform Upper body ADL's without adaptive equipment with standby assist.  2.  Patient will perform Lower body ADL's with adaptive equipment as needed with minimal assistance/contact guard assist .  3. Patient will perform toileting task with standby assist with Good safety to reduce falls risk. 4.  Patient will perform toilet transfers with Dyane Pavy and standby assist..  5.  Patient will perform functional task in standing for 5 minutes with Good static/dynamic balance for improved ADL/IADL function with standby assist and Good safety awareness. 6.  Patient will improve Barthel index scores to atleast 65/100 to improve functional mobility. 7.  Patient will utilize energy conservation techniques during functional activities with minimal verbal cues. OCCUPATIONAL THERAPY LONG TERM GOALS   Initiated 8/6/2018 and to be accomplished within 4 Week(s)    1. Patient will perform ADL's & IADLs with adaptive equipment as needed with modified independence. 2.  Patient will perform toileting task with modified independence with Good safety to reduce falls risk. 3.  Patient will perform all functional transfers utilizing least restrictive device and durable medical equipment as needed with modified independence and Good balance and safety awareness. 4.  Patient will perform standing static/dynamic activity for improved ADL/IADL function with modified independence and Good balance and safety awareness. 5.  Patient will improve Barthel index score to 95/100 to improve independence with mobility. 6. Patient will perform home making tasks and simple meal prep Mod I utilizing energy conservation techniques and good safety awareness.       Therapist: Oscar Johns    8/6/2018            [facility] [place]   Occupational Therapy Daily Treatment note      Patient: Jennifer Benton (4 y.o. female) Date: 8/15/2018  Attending Physician: Gianluca Alvarez MD  Primary Diagnosis: syncope    Treatment Diagnosis  Treatment Diagnosis: muscle weakness  Treatment Diagnosis 2: increased need for assist with self care   Precautions : Precautions at Admission: Fall  Vital Signs:  Vital Signs  Temp: 98.1 °F (36.7 °C)  Temp Source: Oral  Pulse (Heart Rate): (!) 59  Resp Rate: 20  O2 Sat (%): 99 %  BP: 175/58 (nurse notified)  MAP (Calculated): 97  BP 1 Method: Automatic  BP 1 Location: Right arm  BP Patient Position: At rest     Cognitive Status:  Mental Status  Neurologic State: Alert;Confused  Orientation Level: Oriented to person;Oriented to place  Cognition: Decreased attention/concentration; Impaired decision making  Pain:  Pain Screen  Pain Scale 1: Visual  Pain Intensity 1: 0  Patient Stated Pain Goal: 0  Pain Reassessment 1: Patient sleeping  Pain Scale 1: Visual  Bed Mobility:  Bed Mobility  Sit to Supine: Minimum assistance  Scooting: Contact guard assistance  Transfers:  Functional Transfers  Sit to Stand: Stand-by assistance  Stand to Sit: Stand-by assistance     Balance:  Balance  Sitting: Intact  Sitting - Static: Good (unsupported)  Sitting - Dynamic: Fair (occasional) (+)  Standing: With support  Standing - Static: Good  Standing - Dynamic : Fair (+)  Therapeutic Activities:  SBA/CGA fnxl room and hallway mobility with RW ~ 75 ft to increase activity tolerance and endurance for improved overall fnxl performance. FM task with 2# dowel bar to increase ROM and ffinger dexterity to aid with ADL tasks. Pt conts to fatigue easily requiring extended rest breaks in between all tasks. Therapeutic Exercises:   BUE ex with graded clothespin activity to increase b/l coordination and pinch strength for ADL carryover. Patient/Caregiver Education:    Pt educated on importance of OOB activity for increased strength and endurance.      ASSESSMENT:  Patient continues to demonstrate the need for skilled Occupational Therapy services to improve strength, balance, and endurance. Pt is motivated to return home.    Progression toward goals:  [x]      Improving appropriately and progressing toward goals  []      Improving slowly and progressing toward goals  []      Not making progress toward goals and plan of care will be adjusted     Treatment session:   49 minutes    Therapist:    JOSÉ Duval,  8/15/2018

## 2018-08-15 NOTE — ROUTINE PROCESS
Bedside and Verbal shift change report given to FANG Clinton LPN (oncoming nurse) by Georges Lin RN (offgoing nurse). Report included the following information SBAR, Kardex and MAR. Qh visual checks and 24h chart checks done.

## 2018-08-16 PROCEDURE — 74011250637 HC RX REV CODE- 250/637: Performed by: INTERNAL MEDICINE

## 2018-08-16 PROCEDURE — 74011636637 HC RX REV CODE- 636/637: Performed by: INTERNAL MEDICINE

## 2018-08-16 PROCEDURE — 74011000250 HC RX REV CODE- 250: Performed by: INTERNAL MEDICINE

## 2018-08-16 RX ADMIN — LOSARTAN POTASSIUM 100 MG: 50 TABLET ORAL at 09:40

## 2018-08-16 RX ADMIN — DOCUSATE SODIUM 100 MG: 100 CAPSULE, LIQUID FILLED ORAL at 09:40

## 2018-08-16 RX ADMIN — DOCUSATE SODIUM 100 MG: 100 CAPSULE, LIQUID FILLED ORAL at 17:36

## 2018-08-16 RX ADMIN — ASPIRIN 81 MG 81 MG: 81 TABLET ORAL at 09:40

## 2018-08-16 RX ADMIN — PREDNISONE 5 MG: 5 TABLET ORAL at 08:01

## 2018-08-16 RX ADMIN — SIMVASTATIN 40 MG: 40 TABLET, FILM COATED ORAL at 21:31

## 2018-08-16 RX ADMIN — HYDROCHLOROTHIAZIDE 25 MG: 25 TABLET ORAL at 09:40

## 2018-08-16 RX ADMIN — ATENOLOL 50 MG: 50 TABLET ORAL at 21:31

## 2018-08-16 RX ADMIN — PANTOPRAZOLE SODIUM 40 MG: 40 TABLET, DELAYED RELEASE ORAL at 08:01

## 2018-08-16 RX ADMIN — POLYETHYLENE GLYCOL 3350 17 G: 17 POWDER, FOR SOLUTION ORAL at 21:32

## 2018-08-16 RX ADMIN — ATENOLOL 50 MG: 50 TABLET ORAL at 09:40

## 2018-08-16 NOTE — ROUTINE PROCESS
Bedside and Verbal shift change report given to VAN AlvaradoRN (oncoming nurse) by Maricruz Li RN (offgoing nurse). Report included the following information SBAR, Kardex and MAR. QH VISUAL CHECKS AND 24H CHART CHECKS DONE.

## 2018-08-16 NOTE — ROUTINE PROCESS
Bedside and verbal shift report given to RYAN Singleton (oncoming nurse) by MARGIE Marroquin LPN (off going nurse). Report included SBAR, MAR, and Kardex.

## 2018-08-16 NOTE — PROGRESS NOTES
Problem: Mobility Impaired (Adult and Pediatric)  Goal: *Acute Goals and Plan of Care (Insert Text)  PHYSICAL THERAPY STG GOALS :  Initiated 8/6/2018 and to be accomplished within 2 Weeks (Updated 8/13/18)     1. Patient will move from supine to sit and sit to supine  and roll side to side in bed with supervision/set-up. (Progressing; SBA)     2.  Patient will transfer from bed to chair and chair to bed with close supervision/set-up using RW. (Progressing; SBA)   3. Patient will perform sit to stand with close supervision/set-up with Good balance and safety awareness. (Progressing; SBA)   4. Patient will ambulate with stand by assistance for 100 feet with RW on level surfaces with 2 turns. (Achieved)   5. Patient will ascend/descend 5 stairs with left handrail(s) with contact guard assistance to allow for safe home access/exit. (Progressing; 3 4\" steps with Min A x2 and B HR)   6. Patient will improve standardized test score for [place] Standing Balance Scale 2+ to reduce fall risk. (Achieved)     PHYSICAL THERAPY LTG GOALS :  Initiated 8/6/2018 and to be accomplished within 4 Weeks    1. Patient will move from supine to sit and sit to supine  and roll side to side in bed with modified independence. 2.  Patient will transfer from bed to chair and chair to bed with modified independence using RW/rollator. 3.  Patient will perform sit to stand with modified independence with Good balance and safety awareness. 4.  Patient will ambulate with supervision/set-up for 200 feet with RW/rollator on level surfaces and be able to maneuver through narrow spaces and obstacles without loss of balance. 5.  Patient will ascend/descend 5 stairs with left handrail(s) with supervision/set-up to allow for safe home access/exit. 6.  Patient will improve standardized test score for [place] Standing Balance Scale 3 to reduce fall risk.       Physical Therapist:   Senia Kim, PT  on 8/6/2018              [facility] [place]   physical Therapy Daily Treatment note      Patient: Beronica Watt (46 y.o. female)               Date: 8/16/2018    Physician: Sherri Koenig MD  Primary Diagnosis: syncope          Treatment Diagnosis  Treatment Diagnosis: muscle weakness  Treatment Diagnosis 2: increased need for assist with self care  Precautions: Fall  Vital Signs  Vital Signs  Temp: 97.9 °F (36.6 °C)  Temp Source: Oral  Pulse (Heart Rate): 60  Resp Rate: 18  O2 Sat (%): 100 %  BP: 139/73  MAP (Calculated): 95  BP 1 Method: Automatic  BP 1 Location: Right arm  BP Patient Position: At rest  Cognitive Status:  Mental Status  Neurologic State: Alert;Confused  Orientation Level: Oriented X4  Cognition: Follows commands  Pain  Pain Screen  Pain Scale 1: Visual  Pain Intensity 1: 0  Patient Stated Pain Goal: 0  Bed Mobility Training  Bed Mobility Training  Supine to Sit: Stand-by assistance  Scooting: Stand-by assistance  Balance  Sitting: Intact  Sitting - Static: Good (unsupported)  Sitting - Dynamic: Fair (occasional) (((+)))  Standing: With support  Standing - Static: Good  Standing - Dynamic : Fair (((+)))  Transfer Training  Transfer Training  Sit to Stand: Stand-by assistance  Stand to Sit: Stand-by assistance  Bed to Chair: Stand-by assistance  Sit to Stand: Stand-by assistance  Gait Training  Therapeutic Exercise: Session One: Pt presented supine in bed. Pt requested for OOB for breakfast. Bed mobility as noted above. Pt demonstrated good unsupported seated balance at EOB while donning pants, sit<>stand from EOB with SBA. Stand-step transfer EOB>bedside chair with SBA. Pt remained sitting up in bedside chair with call bell at side for breakfast.   Session Two: Pt presented sitting in bedside chair. Pt declined additional therapy. Pt stated \"I'm 80 and I don't feel like it, stop trying to make me do things\". Therapist attempted to educated pt on importance of therapy. Pt became agitated and continued to decline. Patient/Caregiver Education:   Pt /Caregiver Education on safety and fall prevention, importance of therapy. Pt continued to decline (see note above) was provided to maximize functional gains. ASSESSMENT:  Patient continues to benefit from Skilled PT services to improve strength, balance, gait and stair negotiation. Progression toward goals:  [x]      Improving appropriately and progressing toward goals  [x]      Improving slowly and progressing toward goals  []      Not making progress toward goals and plan of care will be adjusted     Treatment session: 15 minutes.   Therapist:   Tariq Dennis PTA,          8/16/2018

## 2018-08-16 NOTE — ROUTINE PROCESS
Bedside and Verbal shift change report given to [place] lpn (oncoming nurse) by memo mahan (offgoing nurse). Report included the following information Kardex, MAR and Recent Results.

## 2018-08-16 NOTE — PROGRESS NOTES
CLIFF received a message from pt's caregiver Whitley Hernandez requesting meeting tomorrow at 10:00-10:30am. SW left message for caregiver informing her that meeting has been scheduled for tomorrow at 10:30am. CLIFF spoke with pt to inform her of the care plan meeting tomorrow at 10:30am. CLIFF gave pt a brochure to invite her to the meeting tomorrow.

## 2018-08-17 PROCEDURE — 74011636637 HC RX REV CODE- 636/637: Performed by: INTERNAL MEDICINE

## 2018-08-17 PROCEDURE — 74011000250 HC RX REV CODE- 250: Performed by: INTERNAL MEDICINE

## 2018-08-17 PROCEDURE — 74011250637 HC RX REV CODE- 250/637: Performed by: INTERNAL MEDICINE

## 2018-08-17 RX ADMIN — LOSARTAN POTASSIUM 100 MG: 50 TABLET ORAL at 09:32

## 2018-08-17 RX ADMIN — HYDROCHLOROTHIAZIDE 25 MG: 25 TABLET ORAL at 09:32

## 2018-08-17 RX ADMIN — DOCUSATE SODIUM 100 MG: 100 CAPSULE, LIQUID FILLED ORAL at 20:29

## 2018-08-17 RX ADMIN — POLYETHYLENE GLYCOL 3350 17 G: 17 POWDER, FOR SOLUTION ORAL at 20:29

## 2018-08-17 RX ADMIN — SIMVASTATIN 40 MG: 40 TABLET, FILM COATED ORAL at 21:07

## 2018-08-17 RX ADMIN — PANTOPRAZOLE SODIUM 40 MG: 40 TABLET, DELAYED RELEASE ORAL at 09:32

## 2018-08-17 RX ADMIN — ATENOLOL 50 MG: 50 TABLET ORAL at 20:29

## 2018-08-17 RX ADMIN — ATENOLOL 50 MG: 50 TABLET ORAL at 09:32

## 2018-08-17 RX ADMIN — ASPIRIN 81 MG 81 MG: 81 TABLET ORAL at 09:32

## 2018-08-17 RX ADMIN — ACETAMINOPHEN 650 MG: 325 TABLET, FILM COATED ORAL at 20:29

## 2018-08-17 RX ADMIN — DOCUSATE SODIUM 100 MG: 100 CAPSULE, LIQUID FILLED ORAL at 09:32

## 2018-08-17 RX ADMIN — PREDNISONE 5 MG: 5 TABLET ORAL at 09:32

## 2018-08-17 NOTE — ROUTINE PROCESS
Bedside and Verbal shift change report given to Thanh Longo lpn (oncoming nurse) by Jona Rice LPN (offgoing nurse). Report included the following information Kardex, MAR and Recent Results.

## 2018-08-17 NOTE — PROGRESS NOTES
Problem: Self Care Deficits Care Plan (Adult)  Goal: *Acute Goals and Plan of Care (Insert Text)  OCCUPATIONAL THERAPY SHORT TERM GOALS      1. Patient will perform Upper body ADL's without adaptive equipment with standby assist.  2.  Patient will perform Lower body ADL's with adaptive equipment as needed with contact guard assist .  3. Patient will perform toileting task with Mod I with Good safety to reduce falls risk. 4.  Patient will perform toilet transfers with Rolling Walker and Supv.  5.  Patient will perform functional task in standing for 8 minutes with Good static/dynamic balance for improved ADL/IADL function with standby assist and Good safety awareness. 6.  Patient will improve Barthel index scores to atleast 65/100 to improve functional mobility. 7.  Patient will utilize energy conservation techniques during functional activities with minimal verbal cues. Updated 8/13/18    1. Patient will perform Upper body ADL's without adaptive equipment with standby assist.-CLOF: bathing w/ CGA , dressing w/ Mod I  2. Patient will perform Lower body ADL's with adaptive equipment as needed with minimal assistance/contact guard assist. - CLOF: bathing w/ Min A , dressing w/ Min A, upgrade  3. Patient will perform toileting task with standby assist with Good safety to reduce falls risk. -CLOF: supv, upgrade  4. Patient will perform toilet transfers with Garfield Rosalind and standby assist.-CLOF: SBA, upgrade  5. Patient will perform functional task in standing for 5 minutes with Good static/dynamic balance for improved ADL/IADL function with standby assist and Good safety awareness. -CLOF:  Fair+ static, Fair dynamic standing balance w/ RW x 5 mins w/ SBA - CGA, upgrade  6. Patient will improve Barthel index scores to atleast 65/100 to improve functional mobility. -CLOF: 55/100  7. Patient will utilize energy conservation techniques during functional activities with minimal verbal cues. -CLOF: progressing w/ min verbal cues    Initiated 8/6/2018 and to be accomplished within 2 Week(s)    1. Patient will perform Upper body ADL's without adaptive equipment with standby assist.  2.  Patient will perform Lower body ADL's with adaptive equipment as needed with minimal assistance/contact guard assist .  3. Patient will perform toileting task with standby assist with Good safety to reduce falls risk. 4.  Patient will perform toilet transfers with Belvie Heir and standby assist..  5.  Patient will perform functional task in standing for 5 minutes with Good static/dynamic balance for improved ADL/IADL function with standby assist and Good safety awareness. 6.  Patient will improve Barthel index scores to atleast 65/100 to improve functional mobility. 7.  Patient will utilize energy conservation techniques during functional activities with minimal verbal cues. OCCUPATIONAL THERAPY LONG TERM GOALS   Initiated 8/6/2018 and to be accomplished within 4 Week(s)    1. Patient will perform ADL's & IADLs with adaptive equipment as needed with modified independence. 2.  Patient will perform toileting task with modified independence with Good safety to reduce falls risk. 3.  Patient will perform all functional transfers utilizing least restrictive device and durable medical equipment as needed with modified independence and Good balance and safety awareness. 4.  Patient will perform standing static/dynamic activity for improved ADL/IADL function with modified independence and Good balance and safety awareness. 5.  Patient will improve Barthel index score to 95/100 to improve independence with mobility. 6. Patient will perform home making tasks and simple meal prep Mod I utilizing energy conservation techniques and good safety awareness.       Therapist: Frannie Nation    8/6/2018            [facility]   Occupational Therapy Daily Treatment note      Patient: Franck Johnson (89 y.o. female) Date: 8/17/2018  Attending Physician: Gianluca Alvarez MD  Primary Diagnosis: syncope    Treatment Diagnosis  Treatment Diagnosis: muscle weakness  Treatment Diagnosis 2: increased need for assist with self care   Precautions : Precautions at Admission: Fall  Vital Signs:  Vital Signs  Temp: 98.4 °F (36.9 °C)  Temp Source: Oral  Pulse (Heart Rate): 65  Resp Rate: 20  O2 Sat (%): 100 %  BP: 122/57  MAP (Calculated): 79  BP 1 Method: Automatic  BP 1 Location: Right arm  BP Patient Position: At rest                  Therapeutic Activities:  Attempted OT treatment prior to family meeting to increase functional activity tolerance and independence with ADL tasks and transfers. Patient presents in room, slightly upset regarding continued hosiptal stay as well as affairs she needs to tend to at home. KUMAR discussed with patient family conference today to discuss discharge planning and recommendations. Attempted functional mobility utilizing RW and exercises however patient declined due to increased fatigue from not sleeping due to worrying. Will check back later. 2nd treatment session: Collaborated with PT, SW, patients caregiver and patient regarding current progression towards OT goals and discharge recommendations. KUMAR informed patient and patient's caregiver she would recommend 24 hour Supervision during all ADL/IADL tasks and transfers for optimal safety due to balance. Patient's caregiver was very adamant that patient is able to complete all aspects of self cares, ADL as well as home management task Independently without assistance. KUMAR and PT informed patient and patient's caregiver these are only recommendations however it is optimal patient's decision. Patient/Caregiver Education:    Pt. Katherine Lauren Education on see above.       ASSESSMENT:  Patient continues to demonstrate the need for skilled Occupational Therapy services to improve dynamic standing balance needed for bathing  Progression toward goals:  [x] Improving appropriately and progressing toward goals  []      Improving slowly and progressing toward goals  []      Not making progress toward goals and plan of care will be adjusted     Treatment session:   50  minutes    Therapist:    JOSÉ Liao,  8/17/2018

## 2018-08-17 NOTE — PROGRESS NOTES
Problem: Mobility Impaired (Adult and Pediatric)  Goal: *Acute Goals and Plan of Care (Insert Text)  PHYSICAL THERAPY STG GOALS :  Initiated 8/6/2018 and to be accomplished within 2 Weeks (Updated 8/13/18)     1. Patient will move from supine to sit and sit to supine  and roll side to side in bed with supervision/set-up. (Progressing; SBA)     2.  Patient will transfer from bed to chair and chair to bed with close supervision/set-up using RW. (Progressing; SBA)   3. Patient will perform sit to stand with close supervision/set-up with Good balance and safety awareness. (Progressing; SBA)   4. Patient will ambulate with stand by assistance for 100 feet with RW on level surfaces with 2 turns. (Achieved)   5. Patient will ascend/descend 5 stairs with left handrail(s) with contact guard assistance to allow for safe home access/exit. (Progressing; 3 4\" steps with Min A x2 and B HR)   6. Patient will improve standardized test score for [place] Standing Balance Scale 2+ to reduce fall risk. (Achieved)     PHYSICAL THERAPY LTG GOALS :  Initiated 8/6/2018 and to be accomplished within 4 Weeks    1. Patient will move from supine to sit and sit to supine  and roll side to side in bed with modified independence. 2.  Patient will transfer from bed to chair and chair to bed with modified independence using RW/rollator. 3.  Patient will perform sit to stand with modified independence with Good balance and safety awareness. 4.  Patient will ambulate with supervision/set-up for 200 feet with RW/rollator on level surfaces and be able to maneuver through narrow spaces and obstacles without loss of balance. 5.  Patient will ascend/descend 5 stairs with left handrail(s) with supervision/set-up to allow for safe home access/exit. 6.  Patient will improve standardized test score for [place] Standing Balance Scale 3 to reduce fall risk.       Physical Therapist:   Kevon Will, PT  on 8/6/2018              [facility]   physical Therapy Daily Treatment note      Patient: Liliya Woodard (87 y.o. female)               Date: 8/17/2018    Physician: Irineo Baker MD  Primary Diagnosis: syncope          Treatment Diagnosis  Treatment Diagnosis: muscle weakness  Treatment Diagnosis 2: increased need for assist with self care  Precautions: Fall  Vital Signs  Vital Signs  Temp: 98.4 °F (36.9 °C)  Temp Source: Oral  Pulse (Heart Rate): 65  Resp Rate: 20  O2 Sat (%): 100 %  BP: 122/57  MAP (Calculated): 79  BP 1 Method: Automatic  BP 1 Location: Right arm  BP Patient Position: At rest     Cognitive Status:     Pain     Bed Mobility Training     Balance     Transfer Training        Gait Training                     With 2 turns. Therapeutic Exercise:    Upon presentation, pt immediately began waving hands and stating, \"Oh no! Oh no, none of this today. \" Therapist informed pt and caregiver who was at bedside that we were present for family meeting. Pt then agreeable. Gait training limited to ~22 ft using RW with SBA, caregiver stating that pt did not need to walk further b/c she doesn't have to at home. Family meeting conducted with pt, pt's caregiver John Paul Capps Zalla, and PT. Caregiver presented a bit aggressive, appeared to be angry. Caregiver believed that therapists told EMERSON and pt's son that pt needed to go to [facility] and reported that son felt therapists were \"backing him into a corner. \" Therapist clarified that we do not hold anyone against their turk, if pt wants to d/c, then she's able to; however, pt's physician is out of town. Therapist also clarified that while we recommend that pt has 24 hour supervision due to impaired gait, impaired balance, that we can only make recommendations, we do not make demands. Also recommended that pt and caregiver have stair training for safe entrance into home, caregiver refused.  SW and therapist educated with reiteration safety risk and need for training; caregiver continued to refuse. Therapist educated that after three refusals, PT will d/c and pt will accumulate a bill for stay on TCC until physician discharges and that pt is already at 2 refusals. Pt and caregiver appeared more willing to participate afterwards; however, pt still adamant about d/c sooner rather than later, does not want to be in facility over the weekend and is upset that physician is unavailable. Recommend 24 hour supervision for return home, caregiver states that pt will not pay someone to watch her sleep. Caregiver is only available for a few hours 3 days/week. SW and therapists decided to allow physician to make decision on d/c as it is evident that pt no longer wants to be in facility. Patient/Caregiver Education:   Pt /Caregiver Education on (see above). ASSESSMENT:  Patient continues to refuse participation. Progression toward goals:  []      Improving appropriately and progressing toward goals  []      Improving slowly and progressing toward goals  [x]      Not making progress toward goals and plan of care will be adjusted     Treatment session: 49 minutes.   Therapist:   Pattie Mckinney, PT,          8/17/2018

## 2018-08-17 NOTE — PROGRESS NOTES
Late entry: care plan/ family conference was held earlier today with pt and her caregiver Laurent Cavazos with SW, PT and KUMAR to discuss pt's progress and d/c needs. Pt is progressing with therapy but still need supervision at home. Pt's caregiver has contacted agency re: private duty assistance and contacted [facility] re: enrolling pt with this. Pt adamant about going home. Pt was informed that rehab is optional and that no one can force her to stay on the unit. Pt informed that she has a right to change physicians if she wasn't satisfied with her care. Pt stated that she doesn't want to change MD's since she has been with the same MD for over 25 years. SW called the office and was told that they weren't sure if the MD will be in on Monday. CLIFF offered pt a d/c date of 8/21/18 when Sw was told the MD would be in the office. Pt's caregiver was able to convince pt to stay until 8/21/18. CLIFF informed pt and caregiver that pt can have home health at d/c. CLIFF provided pt and caregiver a list of home health agencies. Pt was given another brochure re: private duty. Pt seems unwilling to hire additional assistance in the home even though she has the financial means to do so. Pt's caregiver will contact an agency to come in to discuss services with pt. Pt and her son a reportedly upset that \" therapy told [address] [place] [address] that pt needed to go directly to the facility from the hospital and and couldn't go home unless she had services arranged,. CLIFF informed pt and caregiver that the facility can make recommendations and that CLIFF wasn't  sure who [address] [place] [address] spoke with re: care that pt needs at d/c. CLIFF reviewed the medicare notice and the appeal process with pt and caregiver. Pt signed the notice and was given a copy. CLIFF will send note to MD re: d/c request by pt.

## 2018-08-18 PROCEDURE — 74011636637 HC RX REV CODE- 636/637: Performed by: INTERNAL MEDICINE

## 2018-08-18 PROCEDURE — 74011250637 HC RX REV CODE- 250/637: Performed by: INTERNAL MEDICINE

## 2018-08-18 RX ADMIN — LOSARTAN POTASSIUM 100 MG: 50 TABLET ORAL at 08:50

## 2018-08-18 RX ADMIN — PANTOPRAZOLE SODIUM 40 MG: 40 TABLET, DELAYED RELEASE ORAL at 08:50

## 2018-08-18 RX ADMIN — PREDNISONE 5 MG: 5 TABLET ORAL at 08:49

## 2018-08-18 RX ADMIN — ASPIRIN 81 MG 81 MG: 81 TABLET ORAL at 08:50

## 2018-08-18 RX ADMIN — DOCUSATE SODIUM 100 MG: 100 CAPSULE, LIQUID FILLED ORAL at 08:50

## 2018-08-18 RX ADMIN — HYDROCHLOROTHIAZIDE 25 MG: 25 TABLET ORAL at 08:50

## 2018-08-18 RX ADMIN — DOCUSATE SODIUM 100 MG: 100 CAPSULE, LIQUID FILLED ORAL at 17:26

## 2018-08-18 RX ADMIN — ATENOLOL 50 MG: 50 TABLET ORAL at 21:26

## 2018-08-18 RX ADMIN — SIMVASTATIN 40 MG: 40 TABLET, FILM COATED ORAL at 21:26

## 2018-08-18 RX ADMIN — ATENOLOL 50 MG: 50 TABLET ORAL at 08:50

## 2018-08-18 NOTE — ROUTINE PROCESS
Bedside and Verbal shift change report given to Sherri Guzman RN (oncoming nurse) by Julian Hogan LPN  (offgoing nurse). Report included the following information SBAR, Kardex, MAR and Recent Results.

## 2018-08-18 NOTE — ROUTINE PROCESS
Bedside and Verbal shift change report given to VAN AlvaradoRN (oncoming nurse) by Maricruz Li RN (offgoing nurse). Report included the following information SBAR, Kardex and MAR.  QH visual checks and 24h chart checks done

## 2018-08-18 NOTE — ROUTINE PROCESS
Bedside and Verbal shift change report given to [place] lpn (oncoming nurse) by memo mahan (offgoing nurse). Report included the following information Kardex, MAR and Recent Results.

## 2018-08-18 NOTE — PROGRESS NOTES
Problem: Self Care Deficits Care Plan (Adult)  Goal: *Acute Goals and Plan of Care (Insert Text)  OCCUPATIONAL THERAPY SHORT TERM GOALS      1. Patient will perform Upper body ADL's without adaptive equipment with standby assist.  2.  Patient will perform Lower body ADL's with adaptive equipment as needed with contact guard assist .  3. Patient will perform toileting task with Mod I with Good safety to reduce falls risk. 4.  Patient will perform toilet transfers with Rolling Walker and Supv.  5.  Patient will perform functional task in standing for 8 minutes with Good static/dynamic balance for improved ADL/IADL function with standby assist and Good safety awareness. 6.  Patient will improve Barthel index scores to atleast 65/100 to improve functional mobility. 7.  Patient will utilize energy conservation techniques during functional activities with minimal verbal cues. Updated 8/13/18    1. Patient will perform Upper body ADL's without adaptive equipment with standby assist.-CLOF: bathing w/ CGA , dressing w/ Mod I  2. Patient will perform Lower body ADL's with adaptive equipment as needed with minimal assistance/contact guard assist. - CLOF: bathing w/ Min A , dressing w/ Min A, upgrade  3. Patient will perform toileting task with standby assist with Good safety to reduce falls risk. -CLOF: supv, upgrade  4. Patient will perform toilet transfers with [employer] and standby assist.-CLOF: SBA, upgrade  5. Patient will perform functional task in standing for 5 minutes with Good static/dynamic balance for improved ADL/IADL function with standby assist and Good safety awareness. -CLOF:  Fair+ static, Fair dynamic standing balance w/ RW x 5 mins w/ SBA - CGA, upgrade  6. Patient will improve Barthel index scores to atleast 65/100 to improve functional mobility. -CLOF: 55/100  7. Patient will utilize energy conservation techniques during functional activities with minimal verbal cues. -CLOF: progressing w/ min verbal cues    Initiated 8/6/2018 and to be accomplished within 2 Week(s)    1. Patient will perform Upper body ADL's without adaptive equipment with standby assist.  2.  Patient will perform Lower body ADL's with adaptive equipment as needed with minimal assistance/contact guard assist .  3. Patient will perform toileting task with standby assist with Good safety to reduce falls risk. 4.  Patient will perform toilet transfers with [employer] and standby assist..  5.  Patient will perform functional task in standing for 5 minutes with Good static/dynamic balance for improved ADL/IADL function with standby assist and Good safety awareness. 6.  Patient will improve Barthel index scores to atleast 65/100 to improve functional mobility. 7.  Patient will utilize energy conservation techniques during functional activities with minimal verbal cues. OCCUPATIONAL THERAPY LONG TERM GOALS   Initiated 8/6/2018 and to be accomplished within 4 Week(s)    1. Patient will perform ADL's & IADLs with adaptive equipment as needed with modified independence. 2.  Patient will perform toileting task with modified independence with Good safety to reduce falls risk. 3.  Patient will perform all functional transfers utilizing least restrictive device and durable medical equipment as needed with modified independence and Good balance and safety awareness. 4.  Patient will perform standing static/dynamic activity for improved ADL/IADL function with modified independence and Good balance and safety awareness. 5.  Patient will improve Barthel index score to 95/100 to improve independence with mobility. 6. Patient will perform home making tasks and simple meal prep Mod I utilizing energy conservation techniques and good safety awareness.       Therapist: Leon Raya    8/6/2018            [facility]   Occupational Therapy Daily Treatment note      Patient: Liliya Woodard (31 y.o. female) Date: 8/18/2018  Attending Physician: Ever Melchor MD  Primary Diagnosis: syncope    Treatment Diagnosis  Treatment Diagnosis: muscle weakness  Treatment Diagnosis 2: increased need for assist with self care   Precautions : Precautions at Admission: Fall  Vital Signs:  Vital Signs  Pulse (Heart Rate): 65  Resp Rate: 17  O2 Sat (%): 100 %  BP: 119/58  MAP (Calculated): 78     Cognitive Status:  Mental Status  Neurologic State: Alert  Orientation Level: Oriented X4  Cognition: Follows commands  Pain:  Pain Screen  Pain Scale 1: Visual  Pain Intensity 1: 0  Patient Stated Pain Goal: 0  Pain Reassessment 1: Patient sleeping  Pain Scale 1: Visual  Gross Assessment:     Coordination:     Bed Mobility:  Bed Mobility  Rolling: Supervision  Supine to Sit: Supervision  Sit to Supine: Supervision  Scooting: Supervision  Transfers:  Functional Transfers  Sit to Stand: Supervision  Stand to Sit: Supervision  Toilet Transfer : Supervision  Functional Transfers  Toilet Transfer : Supervision  Balance:  Balance  Sitting: Without support  Sitting - Static: Good (unsupported)  Sitting - Dynamic: Fair (occasional) (+)  Standing: With support  Standing - Static: Fair (+)  Standing - Dynamic : Fair  ADL Self Care:  Basic ADL  Toileting: Supervision  ADL Intervention:     Instrumental ADL  Medication Management: Modified independent    Instrumental ADL's:  Instrumental ADL  Medication Management: Modified independent  Therapeutic Activities:  Pt presented in bathroom with RW unsupervised upon therapist arrival, therapist explained at length importance of utilizing call bell to reduce fall risk. Pt demo toileting routine requiring Supervision with all aspects. Pt completed home safety activity with 100% correct for fall prevention and safety along with med mgmt task. Pt requested to be returned back to supine in bed due to fatigue post tx session, call bell left within reach.  Pt reported she hopes to leave Monday because she has been in here too long. Pt conts to require vc's for safety awareness with most tasks. Patient/Caregiver Education:    See above. ASSESSMENT:  Patient continues to demonstrate the need for skilled Occupational Therapy services to improve strength, balance, and endurance.    Progression toward goals:  [x]      Improving appropriately and progressing toward goals  []      Improving slowly and progressing toward goals  []      Not making progress toward goals and plan of care will be adjusted     Treatment session:   48 minutes    Therapist:    JOSÉ Lowe,  8/18/2018

## 2018-08-19 PROCEDURE — 74011250637 HC RX REV CODE- 250/637: Performed by: INTERNAL MEDICINE

## 2018-08-19 PROCEDURE — 74011636637 HC RX REV CODE- 636/637: Performed by: INTERNAL MEDICINE

## 2018-08-19 RX ADMIN — ATENOLOL 50 MG: 50 TABLET ORAL at 21:13

## 2018-08-19 RX ADMIN — ERGOCALCIFEROL 50000 UNITS: 1.25 CAPSULE ORAL at 09:21

## 2018-08-19 RX ADMIN — LOSARTAN POTASSIUM 100 MG: 50 TABLET ORAL at 09:21

## 2018-08-19 RX ADMIN — PREDNISONE 5 MG: 5 TABLET ORAL at 09:21

## 2018-08-19 RX ADMIN — GLYCERIN 1 SUPPOSITORY: 2.1 SUPPOSITORY RECTAL at 14:06

## 2018-08-19 RX ADMIN — PANTOPRAZOLE SODIUM 40 MG: 40 TABLET, DELAYED RELEASE ORAL at 09:21

## 2018-08-19 RX ADMIN — ASPIRIN 81 MG 81 MG: 81 TABLET ORAL at 09:21

## 2018-08-19 RX ADMIN — ATENOLOL 50 MG: 50 TABLET ORAL at 09:21

## 2018-08-19 RX ADMIN — DOCUSATE SODIUM 100 MG: 100 CAPSULE, LIQUID FILLED ORAL at 09:21

## 2018-08-19 RX ADMIN — DOCUSATE SODIUM 100 MG: 100 CAPSULE, LIQUID FILLED ORAL at 17:08

## 2018-08-19 RX ADMIN — MAGNESIUM HYDROXIDE 30 ML: 400 SUSPENSION ORAL at 14:10

## 2018-08-19 RX ADMIN — SIMVASTATIN 40 MG: 40 TABLET, FILM COATED ORAL at 21:13

## 2018-08-19 RX ADMIN — HYDROCHLOROTHIAZIDE 25 MG: 25 TABLET ORAL at 09:21

## 2018-08-19 NOTE — ROUTINE PROCESS
Bedside and Verbal shift change report given to VAN AlvaradoRN (oncoming nurse) by Georges Lin RN   (offgoing nurse). Report included the following information SBAR, Kardex and MAR.  QH visual checks and 24h chart checks done

## 2018-08-19 NOTE — ROUTINE PROCESS
Bedside and verbal shift report given to RYAN Unger (oncoming nurse) by MARGIE Weston LPN (off going nurse). Report included SBAR, MAR and Kardex.

## 2018-08-20 PROCEDURE — 74011000250 HC RX REV CODE- 250: Performed by: INTERNAL MEDICINE

## 2018-08-20 PROCEDURE — 74011636637 HC RX REV CODE- 636/637: Performed by: INTERNAL MEDICINE

## 2018-08-20 PROCEDURE — 74011250637 HC RX REV CODE- 250/637: Performed by: INTERNAL MEDICINE

## 2018-08-20 RX ADMIN — POLYETHYLENE GLYCOL 3350 17 G: 17 POWDER, FOR SOLUTION ORAL at 21:55

## 2018-08-20 RX ADMIN — ATENOLOL 50 MG: 50 TABLET ORAL at 09:29

## 2018-08-20 RX ADMIN — PANTOPRAZOLE SODIUM 40 MG: 40 TABLET, DELAYED RELEASE ORAL at 09:29

## 2018-08-20 RX ADMIN — DOCUSATE SODIUM 100 MG: 100 CAPSULE, LIQUID FILLED ORAL at 09:29

## 2018-08-20 RX ADMIN — LOSARTAN POTASSIUM 100 MG: 50 TABLET ORAL at 09:29

## 2018-08-20 RX ADMIN — HYDROCHLOROTHIAZIDE 25 MG: 25 TABLET ORAL at 09:29

## 2018-08-20 RX ADMIN — PREDNISONE 5 MG: 5 TABLET ORAL at 09:29

## 2018-08-20 RX ADMIN — DOCUSATE SODIUM 100 MG: 100 CAPSULE, LIQUID FILLED ORAL at 17:15

## 2018-08-20 RX ADMIN — SIMVASTATIN 40 MG: 40 TABLET, FILM COATED ORAL at 21:55

## 2018-08-20 RX ADMIN — ASPIRIN 81 MG 81 MG: 81 TABLET ORAL at 09:29

## 2018-08-20 RX ADMIN — ATENOLOL 50 MG: 50 TABLET ORAL at 21:55

## 2018-08-20 NOTE — PROGRESS NOTES
Nutrition follow-up/Plan of care [facility]     RECOMMENDATIONS:     1. Cardiac Diet  2. Monitor weight, labs and PO intake  3. RD to follow     GOALS:     1. Ongoing: PO intake meets >75% of protein/calorie needs by 8/27  2. Ongoing: Weight Maintenance (+/- 1-2 lb) by 8/27      ASSESSMENT:     Weight status is classified as normal per BMI of 20.6 However, patient is at nutrition risk due to BMI below 23 with patient above 72years of age. PO intake is adequate. Labs noted. No new labs. Nutrition recommendations listed. RD to follow. Nutrition Risk:  [] High  [] Moderate [x]  Low    SUBJECTIVE/OBJECTIVE:      Pt transferred from  to [facility] on 8/4/2018 after being admitted with syncope and collapse. Patient with wound (left elbow, bruise to forehead, lower back) from fall. Patient reports having a good appetite and eating all of meals. States weight has been stable at 121 lb. Denies food allergies and problems with chewing/swallowing. State she is usually regular with her BM and has not had one today so will order prune juice on AM trays. Last BM (8/3). Will monitor. (8/20): Pt w/ a good appetite and usually consumes % of meals. Pt seen OOB in chair at lunch. Pt unable to consume main dish that was served so was able to bring her a sandwich in place of it; observed 75% of meal consumed. Last BM (8/19); bowel regimen in place. Will monitor.      Information Obtained from:    [x] Chart Review   [x] Patient   [] Family/Caregiver   [] Nurse/Physician   [] Interdisciplinary Meeting/Rounds    Diet: Cardiac Diet  Medications: [x] Reviewed    Allergies: [x] Reviewed   Patient Active Problem List   Diagnosis Code    Polymyalgia rheumatica (HCC) M35.3    Altered mental status R41.82    CAD (coronary artery disease) I25.10    Hypomagnesemia E83.42    Syncope R55    Hypokalemia E87.6    HTN (hypertension) I10    Knee pain, left M25.562    Weakness R53.1       Past Medical History:   Diagnosis Date    Arthritis     CAD (coronary artery disease) 2001    3 coronary stents    Cardiac complication     Hypertension     Migraine 3/1/2015    Polymyalgia rheumatica ([facility] [address] 2/28/2015      Labs:    Lab Results   Component Value Date/Time    Sodium 133 (L) 08/05/2018 01:54 PM    Potassium 3.6 08/05/2018 01:54 PM    Chloride 93 (L) 08/05/2018 01:54 PM    CO2 30 08/05/2018 01:54 PM    Anion gap 10 08/05/2018 01:54 PM    Glucose 130 (H) 08/05/2018 01:54 PM    BUN 29 (H) 08/05/2018 01:54 PM    Creatinine 0.89 08/05/2018 01:54 PM    Calcium 9.9 08/05/2018 01:54 PM    Magnesium 2.1 08/01/2018 06:50 AM    Albumin 3.5 07/31/2018 08:12 PM     Anthropometrics: BMI (calculated): 20.6  Last 3 Recorded Weights in this Encounter    08/04/18 2145   Weight: 56.1 kg (123 lb 9.6 oz)      Ht Readings from Last 1 Encounters:   07/31/18 5' 5\" (1.651 m)     Weight Metrics 8/4/2018 8/4/2018 3/5/2015 2/20/2013 2/17/2013   Weight 123 lb 9.6 oz 126 lb 153 lb 163 lb 11.2 oz 178 lb 1.6 oz   BMI 20.57 kg/m2 20.97 kg/m2 25.46 kg/m2 30.95 kg/m2 30.56 kg/m2       No data found.     UBW: 121 lb  [] Weight Loss [] Weight Gain [x] Weight Stable    Estimated Nutrition Needs: [x] MSJ    Calories: 8050-8412 Kcal Based on:   [x] Actual BW    Protein:   65 g Based on:   [x] Actual BW    Fluid:       1500 ml Based on:   [x] Actual BW     Nutrition Problems Identified:   [] Suboptimal PO intake   [] Food Allergies  [] Difficulty chewing/swallowing/poor dentition  [x] Constipation/Diarrhea (Bowel regimen in place)  [] Nausea/Vomiting   [] None  [] Other:     Plan:   [x] Therapeutic Diet  []  Obtained/adjusted food preferences/tolerances and/or snacks options   []  Supplements added   [] Occupational therapy following for feeding techniques  []  HS snack added   []  Modify diet texture   []  Modify diet for food allergies   []  Assist with menu selection   [x]  Monitor PO intake on meal rounds   [x]  Continue inpatient monitoring and intervention   [x] Participated in discharge planning/Interdisciplinary rounds/Team meetings   []  Other:     Education Needs:   [] Not appropriate for teaching at this time due to:   [x] Identified and addressed    Nutrition Monitoring and Evaluation:  [x] Continue ongoing monitoring and intervention  [] Other    Jacoby Trejo

## 2018-08-20 NOTE — PROGRESS NOTES
Problem: Mobility Impaired (Adult and Pediatric)  Goal: *Acute Goals and Plan of Care (Insert Text)  PHYSICAL THERAPY STG GOALS :  Initiated 8/6/2018 and to be accomplished within 2 Weeks (Updated 8/20/18)     1. Patient will move from supine to sit and sit to supine  and roll side to side in bed with supervision/set-up. (Achieved)     2. Patient will transfer from bed to chair and chair to bed with close supervision/set-up using RW. (Achieved)   3. Patient will perform sit to stand with close supervision/set-up with Good balance and safety awareness. (Achieved)   4. Patient will ambulate with stand by assistance for 100 feet with RW on level surfaces with 2 turns. (Achieved)   5. Patient will ascend/descend 5 stairs with left handrail(s) with contact guard assistance to allow for safe home access/exit. (Progressing; 3 4\" steps with CGA using B HR)   6. Patient will improve standardized test score for [place] Standing Balance Scale 2+ to reduce fall risk. (Achieved)    PHYSICAL THERAPY STG GOALS :  Initiated 8/6/2018 and to be accomplished within 2 Weeks (Updated 8/13/18)     1. Patient will move from supine to sit and sit to supine  and roll side to side in bed with supervision/set-up. (Progressing; SBA)     2.  Patient will transfer from bed to chair and chair to bed with close supervision/set-up using RW. (Progressing; SBA)   3. Patient will perform sit to stand with close supervision/set-up with Good balance and safety awareness. (Progressing; SBA)   4. Patient will ambulate with stand by assistance for 100 feet with RW on level surfaces with 2 turns. (Achieved)   5. Patient will ascend/descend 5 stairs with left handrail(s) with contact guard assistance to allow for safe home access/exit. (Progressing; 3 4\" steps with Min A x2 and B HR)   6. Patient will improve standardized test score for [place] Standing Balance Scale 2+ to reduce fall risk.  (Achieved)     PHYSICAL THERAPY LTG GOALS :  Initiated 8/6/2018 and to be accomplished within 4 Weeks    1. Patient will move from supine to sit and sit to supine  and roll side to side in bed with modified independence. 2.  Patient will transfer from bed to chair and chair to bed with modified independence using RW/rollator. 3.  Patient will perform sit to stand with modified independence with Good balance and safety awareness. 4.  Patient will ambulate with supervision/set-up for 200 feet with RW/rollator on level surfaces and be able to maneuver through narrow spaces and obstacles without loss of balance. 5.  Patient will ascend/descend 5 stairs with left handrail(s) with supervision/set-up to allow for safe home access/exit. 6.  Patient will improve standardized test score for [place] Standing Balance Scale 3 to reduce fall risk. Physical Therapist:   Joseph Hernandez PT  on 8/6/2018              [facility]   PHYSICAL THERAPY WEEKLY PROGRESS REPORT  Reporting Period:  Date:   8/13/18*  to 8/20/18      Patient: Daryle Better (8 y.o. female)                         Date: 8/20/2018    Primary Diagnosis: syncope      Attending Physician: Liza Avelar MD   Treatment Diagnosis  Treatment Diagnosis: muscle weakness  Treatment Diagnosis 2: difficulty in walking  Precautions:  Fall  Rehab Potential : Good:    Skill interventions and education provided with clinical rationale (include individualized treatment techniques and standardized tests):   Skilled Physical Therapy services were provided with:   TE rendered to address strength, endurance, mobility. TA rendered to address bed mobility, sit <> stand, bed <> chair transfers. Gait training to address gait deficits and assess use of RW. Neuromuscular re-education to address balance impairments and reduce fall risk. Patient and family education. Stair training to address stair negotiation for safe entrance into home.    Wheelchair management to address safe wheelchair mobility to increase independence in facility. Using a comparative statement, summarize significant progress toward goals as a result of skilled intervention provided:  Patient has made Good progress towards their Physical Therapy goals in the areas of bed mobility, transfers, ambulation, balance; fair progression towards stair goal from min A x 2 to CGA x 1. Identify remaining functional areas, impairments limiting progress and/or barriers to improvement:  Patient would benefit from continues PT services to address the following functional deficits in dynamic standing balance, ambulation using RW, stair negotiation. Barriers to improvement include: self-limiting behavior with refusals to participation as she wishes to d/c home.      OBJECTIVE DATA SUMMARY:     INITIAL ASSESSMENT WEEKLY ASSESSMENT   COGNITIVE STATUS COGNITIVE STATUS   Neurologic State: Alert, Appropriate for age  Orientation Level: Oriented X4  Cognition: Appropriate for age attention/concentration  Perception: Appears intact  Perseveration: No perseveration noted  Safety/Judgement: Fall prevention Neurologic State: Alert  Orientation Level: Oriented X4  Cognition: Impulsive  Perception: Appears intact  Perseveration: No perseveration noted  Safety/Judgement: Fall prevention   PAIN PAIN   Pain Scale 1: Numeric (0 - 10)  Pain Intensity 1: 0  Pain Onset 1: acute  Pain Location 1: Head  Pain Orientation 1: Mid  Pain Description 1: Aching  Pain Intervention(s) 1: Medication (see MAR)  Patient Stated Pain Goal: 0  Pain Reassessment 1: Other (comment)  Additional Pain Sites:  (no) Pain Scale 1: Numeric (0 - 10)  Pain Intensity 1: 0  Pain Onset 1: 0  Pain Location 1: Head  Pain Orientation 1: Mid  Pain Description 1: Aching  Pain Intervention(s) 1: Medication (see MAR)  Patient Stated Pain Goal: 0  Pain Reassessment 1: Patient sleeping  Additional Pain Sites:  (no)   GROSS ASSESSMENT GROSS ASSESSMENT   AROM: Generally decreased, functional  PROM: Generally decreased, functional  Strength: Generally decreased, functional (RLE grossly 3+/5, LLE demonstrates 3/5)  Coordination: Generally decreased, functional  Tone: Normal  Sensation: Intact (BUEs,shoulder flexion 90 degrees, elbow WNL) AROM: Generally decreased, functional (BUEs,shoulder flexion 90 degrees, elbow WNL)  PROM: Generally decreased, functional (BUEs,shoulder flexion 110 degrees, elbow WNL)  Strength: Generally decreased, functional (BUEs 3+/5 within range)  Coordination: Within functional limits (BUEs,shoulder flexion 90 degrees, elbow WNL)  Tone:  (BUEs,shoulder flexion 90 degrees, elbow WNL)  Sensation: Intact (BUEs,shoulder flexion 90 degrees, elbow WNL)   BED MOBILITY BED MOBILITY   Rolling: Contact guard assistance  Supine to Sit: Contact guard assistance  Sit to Supine: Minimum assistance (elevating LLE)  Scooting: Contact guard assistance Rolling: Supervision  Supine to Sit: Modified independent  Sit to Supine: Supervision  Scooting: Supervision   GAIT GAIT   Base of Support: Center of gravity altered  Speed/Tisha: Slow  Step Length: Left shortened, Right shortened  Gait Abnormalities: Decreased step clearance  Ambulation - Level of Assistance: Contact guard assistance  Distance (ft): 18 Feet (ft) (12)  Assistive Device: Gait belt, Walker, rolling  Rail Use: Both  Stairs - Level of Assistance: Minimum assistance, Assist X2  Number of Stairs Trained: 3 (4\" steps )  Interventions: Safety awareness training, Verbal cues, Tactile cues Base of Support: Center of gravity altered  Speed/Tisha: Slow  Step Length: Left shortened, Right shortened  Gait Abnormalities: Decreased step clearance  Ambulation - Level of Assistance: Stand-by assistance  Distance (ft): 96 Feet (ft)  Assistive Device: Gait belt, Walker, rolling  Rail Use: Both  Stairs - Level of Assistance: Contact guard assistance  Number of Stairs Trained: 3 (4-inch stairs)  Interventions: Safety awareness training, Verbal cues   Exceptions to WDL Exceptions to WDL               TRANSFERS TRANSFERS   Sit to Stand: Contact guard assistance  Stand to Sit: Contact guard assistance  Bed to Chair: Contact guard assistance (w/ RW) Sit to Stand: Supervision  Stand to Sit: Supervision  Bed to Chair: Stand-by assistance   BALANCE BALANCE   Sitting: Without support  Sitting - Static: Good (unsupported)  Sitting - Dynamic: Fair (occasional) (+)  Standing: With support  Standing - Static: Fair (+)  Standing - Dynamic : Fair Sitting: Intact  Sitting - Static: Good (unsupported)  Sitting - Dynamic: Fair (occasional) (+)  Standing: With support  Standing - Static: Fair (+)  Standing - Dynamic : Fair (+)   WHEELCHAIR MOBILITY/MGMT WHEELCHAIR MOBILITY/MGMT         Activity Tolerance:  Fair Activity Tolerance: Fair   Visual/Perceptual          Visual/Perceptual            Auditory:   Auditory Impairment: Hard of hearing, bilateral    Auditory:   Auditory  Auditory Impairment: Hard of hearing, left side       Steps: both   Clinical Decision makin+  Clinical Decision makin [place] standing balance scale     Treatment:   Reviewed chart, no updated noted on pt's d/c. During family conference, it was decided that pt's d/c will be left up to physician and his return. Upon initial presentation, pt very pleasant. Educated on goals for physical therapy session today, pt agreeable. Therapist departed to allow pt to enjoy breakfast. Upon return, pt became agitated, with elevated voice, asking when she was going home. Therapist informed this would be when physician returned, pt not pleased with this answer. Therapists educated on PT and OT for today, educated on need for participation to avoid d/c as she has already refused 2/3 times. Pt eventually agreeable. Gait training x 96 ft using RW. Stair training completed with encouragement, pt responded with agitation and elevated voice, 3 4-inch stairs using B HR with CGA.    Progress noted though pt is participates less. Patient's response to treatment rendered:  Limited by self-limiting behavior. Patient expected Discharge Location:  [x]Private Residence  [] skilled nursing/ILF  []LTC  []Other:    Plan: Continue Skilled PT services as established by the Plan of Care for 3-6 times a week. PT and Assistant have had a weekly case conference regarding the above treatment:  [x] Yes     [] No       Treatment session:  49 minutes. Therapist: Imelda Brito, PT       Date:8/20/2018  Forward to PT for co-signature when completed.

## 2018-08-20 NOTE — PROGRESS NOTES
Problem: Self Care Deficits Care Plan (Adult)  Goal: *Acute Goals and Plan of Care (Insert Text)  OCCUPATIONAL THERAPY SHORT TERM GOALS      1. Patient will perform Upper body ADL's without adaptive equipment with standby assist.  2.  Patient will perform Lower body ADL's with adaptive equipment as needed with contact guard assist .  3. Patient will perform toileting task with Mod I with Good safety to reduce falls risk. 4.  Patient will perform toilet transfers with Rolling Walker and Supv.  5.  Patient will perform functional task in standing for 8 minutes with Good static/dynamic balance for improved ADL/IADL function with standby assist and Good safety awareness. 6.  Patient will improve Barthel index scores to atleast 65/100 to improve functional mobility. 7.  Patient will utilize energy conservation techniques during functional activities with minimal verbal cues. Updated 8/13/18    1. Patient will perform Upper body ADL's without adaptive equipment with standby assist.-CLOF: bathing w/ CGA , dressing w/ Mod I  2. Patient will perform Lower body ADL's with adaptive equipment as needed with minimal assistance/contact guard assist. - CLOF: bathing w/ Min A , dressing w/ Min A, upgrade  3. Patient will perform toileting task with standby assist with Good safety to reduce falls risk. -CLOF: supv, upgrade  4. Patient will perform toilet transfers with Saad Litter and standby assist.-CLOF: SBA, upgrade  5. Patient will perform functional task in standing for 5 minutes with Good static/dynamic balance for improved ADL/IADL function with standby assist and Good safety awareness. -CLOF:  Fair+ static, Fair dynamic standing balance w/ RW x 5 mins w/ SBA - CGA, upgrade  6. Patient will improve Barthel index scores to atleast 65/100 to improve functional mobility. -CLOF: 55/100  7. Patient will utilize energy conservation techniques during functional activities with minimal verbal cues. -CLOF: progressing w/ min verbal cues    Initiated 8/6/2018 and to be accomplished within 2 Week(s)    1. Patient will perform Upper body ADL's without adaptive equipment with standby assist.  2.  Patient will perform Lower body ADL's with adaptive equipment as needed with minimal assistance/contact guard assist .  3. Patient will perform toileting task with standby assist with Good safety to reduce falls risk. 4.  Patient will perform toilet transfers with [facility] and standby assist..  5.  Patient will perform functional task in standing for 5 minutes with Good static/dynamic balance for improved ADL/IADL function with standby assist and Good safety awareness. 6.  Patient will improve Barthel index scores to atleast 65/100 to improve functional mobility. 7.  Patient will utilize energy conservation techniques during functional activities with minimal verbal cues. OCCUPATIONAL THERAPY LONG TERM GOALS   Initiated 8/6/2018 and to be accomplished within 4 Week(s)    1. Patient will perform ADL's & IADLs with adaptive equipment as needed with modified independence. 2.  Patient will perform toileting task with modified independence with Good safety to reduce falls risk. 3.  Patient will perform all functional transfers utilizing least restrictive device and durable medical equipment as needed with modified independence and Good balance and safety awareness. 4.  Patient will perform standing static/dynamic activity for improved ADL/IADL function with modified independence and Good balance and safety awareness. 5.  Patient will improve Barthel index score to 95/100 to improve independence with mobility. 6. Patient will perform home making tasks and simple meal prep Mod I utilizing energy conservation techniques and good safety awareness.       Therapist: Maura Marshall    8/6/2018            [facility]   Occupational Therapy Daily Treatment note      Patient: Lisa Vogt (33 y.o. female) Date: 8/20/2018  Attending Physician: Michael Devries MD  Primary Diagnosis: syncope    Treatment Diagnosis  Treatment Diagnosis: muscle weakness  Treatment Diagnosis 2: increased need for assist with self care   Precautions : Precautions at Admission: Fall  Vital Signs:  Vital Signs  Temp: 96.9 °F (36.1 °C)  Temp Source: Oral  Pulse (Heart Rate): 64  Resp Rate: 18  O2 Sat (%): 98 %  BP: 166/79  MAP (Calculated): 108     Cognitive Status:  Mental Status  Neurologic State: Alert  Orientation Level: Oriented X4  Cognition: Impulsive  Pain:  Pain Screen  Pain Scale 1: Numeric (0 - 10)  Pain Intensity 1: 0  Patient Stated Pain Goal: 0  Pain Scale 1: Numeric (0 - 10)  Gross Assessment:     Coordination:     Bed Mobility:  Bed Mobility  Supine to Sit: Modified independent  Transfers:  Functional Transfers  Sit to Stand: Supervision  Stand to Sit: Supervision     Balance:  Balance  Sitting: Intact  Standing: With support  Standing - Static: Fair (+)  Standing - Dynamic : Fair (+)       Therapeutic Activities:  Co-treated with PT for optimal functional gains with dynamic standing balance needed for ADLS. Patient presents in room, agitated regarding continued stay on unit and awaiting MD to discharge her. Therapists informed patient, they are unsure regarding MD status on her discharge. Patient started to rise her voice and increased agitation regarding therapist's response. Patient argeed to complete slight functional mobility to show participation. Functional mobility utilizing RW from patient's room to therapy room in order to increase functional activity tolerance and independence during task. Patien able to complete 96ft with SBA prior to requesting to sit due to fatigue. KUMAR discussed with patient current level of A with ADL tasks. Patient reports no difficulty and able to complete her ADLS as well as home management task when returned home. Patient/Caregiver Education:    Pt. Margie Tinajero Education on see above. ASSESSMENT:  Patient continues to demonstrate the need for skilled Occupational Therapy services to improve dynamic standing balance needed for bathing  Progression toward goals:  [x]      Improving appropriately and progressing toward goals  []      Improving slowly and progressing toward goals  []      Not making progress toward goals and plan of care will be adjusted     Treatment session:   25 minutes    Therapist:    JOSÉ Liao,  8/20/2018

## 2018-08-20 NOTE — ROUTINE PROCESS
Written shift change report given to [employer] RN (oncoming nurse) by Ana Maria Marcial RN (offgoing nurse). Report included the following information SBAR, Kardex and MAR.

## 2018-08-21 ENCOUNTER — HOME HEALTH ADMISSION (OUTPATIENT)
Dept: HOME HEALTH SERVICES | Facility: HOME HEALTH | Age: 83
End: 2018-08-21
Payer: MEDICARE

## 2018-08-21 VITALS
OXYGEN SATURATION: 100 % | TEMPERATURE: 97.8 F | BODY MASS INDEX: 20.57 KG/M2 | WEIGHT: 123.6 LBS | DIASTOLIC BLOOD PRESSURE: 48 MMHG | HEART RATE: 58 BPM | RESPIRATION RATE: 16 BRPM | SYSTOLIC BLOOD PRESSURE: 100 MMHG

## 2018-08-21 PROCEDURE — 74011250637 HC RX REV CODE- 250/637: Performed by: INTERNAL MEDICINE

## 2018-08-21 PROCEDURE — 74011636637 HC RX REV CODE- 636/637: Performed by: INTERNAL MEDICINE

## 2018-08-21 RX ORDER — POLYETHYLENE GLYCOL 3350 17 G/17G
17 POWDER, FOR SOLUTION ORAL
Qty: 12 PACKET | Refills: 0 | Status: SHIPPED
Start: 2018-08-21

## 2018-08-21 RX ADMIN — DOCUSATE SODIUM 100 MG: 100 CAPSULE, LIQUID FILLED ORAL at 08:46

## 2018-08-21 RX ADMIN — HYDROCHLOROTHIAZIDE 25 MG: 25 TABLET ORAL at 08:46

## 2018-08-21 RX ADMIN — LOSARTAN POTASSIUM 100 MG: 50 TABLET ORAL at 08:46

## 2018-08-21 RX ADMIN — PREDNISONE 5 MG: 5 TABLET ORAL at 08:46

## 2018-08-21 RX ADMIN — ASPIRIN 81 MG 81 MG: 81 TABLET ORAL at 08:46

## 2018-08-21 RX ADMIN — PANTOPRAZOLE SODIUM 40 MG: 40 TABLET, DELAYED RELEASE ORAL at 08:46

## 2018-08-21 RX ADMIN — ATENOLOL 50 MG: 50 TABLET ORAL at 08:46

## 2018-08-21 NOTE — PROGRESS NOTES
Pt given discharge instructions and opportunity for questions was provided. Pt vital signs were WNL and no signs / symptoms of distress noted. All medications were reviewed. Pt verbalized understanding. Pt discharged with neighbor in private car.

## 2018-08-21 NOTE — PROGRESS NOTES
Problem: Mobility Impaired (Adult and Pediatric)  Goal: *Acute Goals and Plan of Care (Insert Text)  PHYSICAL THERAPY STG GOALS :  Initiated 8/6/2018 and to be accomplished within 2 Weeks (Updated 8/20/18)     1. Patient will move from supine to sit and sit to supine  and roll side to side in bed with supervision/set-up. (Achieved)     2. Patient will transfer from bed to chair and chair to bed with close supervision/set-up using RW. (Achieved)   3. Patient will perform sit to stand with close supervision/set-up with Good balance and safety awareness. (Achieved)   4. Patient will ambulate with stand by assistance for 100 feet with RW on level surfaces with 2 turns. (Achieved)   5. Patient will ascend/descend 5 stairs with left handrail(s) with contact guard assistance to allow for safe home access/exit. (Progressing; 3 4\" steps with CGA using B HR)   6. Patient will improve standardized test score for [place] Standing Balance Scale 2+ to reduce fall risk. (Achieved)    PHYSICAL THERAPY STG GOALS :  Initiated 8/6/2018 and to be accomplished within 2 Weeks (Updated 8/13/18)     1. Patient will move from supine to sit and sit to supine  and roll side to side in bed with supervision/set-up. (Progressing; SBA)     2.  Patient will transfer from bed to chair and chair to bed with close supervision/set-up using RW. (Progressing; SBA)   3. Patient will perform sit to stand with close supervision/set-up with Good balance and safety awareness. (Progressing; SBA)   4. Patient will ambulate with stand by assistance for 100 feet with RW on level surfaces with 2 turns. (Achieved)   5. Patient will ascend/descend 5 stairs with left handrail(s) with contact guard assistance to allow for safe home access/exit. (Progressing; 3 4\" steps with Min A x2 and B HR)   6. Patient will improve standardized test score for [place] Standing Balance Scale 2+ to reduce fall risk.  (Achieved)     PHYSICAL THERAPY LTG GOALS :  Initiated 8/6/2018 and to be accomplished within 4 Weeks    1. Patient will move from supine to sit and sit to supine  and roll side to side in bed with modified independence. 2.  Patient will transfer from bed to chair and chair to bed with modified independence using RW/rollator. 3.  Patient will perform sit to stand with modified independence with Good balance and safety awareness. 4.  Patient will ambulate with supervision/set-up for 200 feet with RW/rollator on level surfaces and be able to maneuver through narrow spaces and obstacles without loss of balance. 5.  Patient will ascend/descend 5 stairs with left handrail(s) with supervision/set-up to allow for safe home access/exit. 6.  Patient will improve standardized test score for [place] Standing Balance Scale 3 to reduce fall risk. Physical Therapist:   Margaret Scott PT  on 8/6/2018               [address] [place] [address]   physical Therapy Daily Treatment note      Patient: Lala Conti (23 y.o. female)               Date: 8/21/2018    Physician: Chen Mathias MD  Primary Diagnosis: syncope          Treatment Diagnosis  Treatment Diagnosis: muscle weakness  Treatment Diagnosis 2: increased need for assist with self care  Precautions: Fall  Vital Signs  Vital Signs  Temp: 97.8 °F (36.6 °C)  Temp Source: Oral  Pulse (Heart Rate): (!) 58  Resp Rate: 16  O2 Sat (%): 100 %  BP: 100/48 (nurse notified)  MAP (Calculated): 65  BP 1 Method: Automatic  BP 1 Location: Right arm  BP Patient Position: Sitting     Cognitive Status:     Pain     Bed Mobility Training     Balance     Transfer Training        Gait Training                 Therapeutic Exercise:    Upon presentation,pt sitting in chair in room. Pt immediately states, \"Oh no. Not you again. \" With inquiry, pt states that she is going home today, therapist followed up with notes in chart and with SW; no confirmation of d/c today.  Therapist spoke with pt, informed of no confirmation, pt still very adamant about not participating in skilled PT services today. Therapist educated on benefits of ambulation, pt refused. Therapist educated on ambulation in room with safe footwear, pt continued to walk in room without shoes or non-slip socks. Pt adamant throughout session that she was not willing to participate in PT, that she had a lot to do today prior to leaving for home. Awaiting confirmation of d/c. Patient/Caregiver Education:   Pt /Caregiver Education on (see above). ASSESSMENT:  Patient continues to resist participation, anticipate d/c from facility, awaiting confirmation. Progression toward goals:  []      Improving appropriately and progressing toward goals  [x]      Improving slowly and progressing toward goals  []      Not making progress toward goals and plan of care will be adjusted      Treatment session: 15 minutes.   Therapist:   Albertina Vann, PT,          8/21/2018

## 2018-08-21 NOTE — PROGRESS NOTES
SW spoke with pt and caregiver re: home health referral. Pt signed the Freedom of Choice for [address]. and was given a copy. SW left message for home care liaison re: referral and pt's d/c home today.

## 2018-08-21 NOTE — PROGRESS NOTES
Problem: Self Care Deficits Care Plan (Adult)  Goal: *Acute Goals and Plan of Care (Insert Text)  OCCUPATIONAL THERAPY SHORT TERM GOALS      1. Patient will perform Upper body ADL's without adaptive equipment with standby assist.  2.  Patient will perform Lower body ADL's with adaptive equipment as needed with contact guard assist .  3. Patient will perform toileting task with Mod I with Good safety to reduce falls risk. 4.  Patient will perform toilet transfers with Rolling Walker and Supv.  5.  Patient will perform functional task in standing for 8 minutes with Good static/dynamic balance for improved ADL/IADL function with standby assist and Good safety awareness. 6.  Patient will improve Barthel index scores to atleast 65/100 to improve functional mobility. 7.  Patient will utilize energy conservation techniques during functional activities with minimal verbal cues. Updated 8/13/18    1. Patient will perform Upper body ADL's without adaptive equipment with standby assist.-CLOF: bathing w/ CGA , dressing w/ Mod I  2. Patient will perform Lower body ADL's with adaptive equipment as needed with minimal assistance/contact guard assist. - CLOF: bathing w/ Min A , dressing w/ Min A, upgrade  3. Patient will perform toileting task with standby assist with Good safety to reduce falls risk. -CLOF: supv, upgrade  4. Patient will perform toilet transfers with Bernette Peon and standby assist.-CLOF: SBA, upgrade  5. Patient will perform functional task in standing for 5 minutes with Good static/dynamic balance for improved ADL/IADL function with standby assist and Good safety awareness. -CLOF:  Fair+ static, Fair dynamic standing balance w/ RW x 5 mins w/ SBA - CGA, upgrade  6. Patient will improve Barthel index scores to atleast 65/100 to improve functional mobility. -CLOF: 55/100  7. Patient will utilize energy conservation techniques during functional activities with minimal verbal cues. -CLOF: progressing w/ min verbal cues    Initiated 8/6/2018 and to be accomplished within 2 Week(s)    1. Patient will perform Upper body ADL's without adaptive equipment with standby assist.  2.  Patient will perform Lower body ADL's with adaptive equipment as needed with minimal assistance/contact guard assist .  3. Patient will perform toileting task with standby assist with Good safety to reduce falls risk. 4.  Patient will perform toilet transfers with Travis Hammock and standby assist..  5.  Patient will perform functional task in standing for 5 minutes with Good static/dynamic balance for improved ADL/IADL function with standby assist and Good safety awareness. 6.  Patient will improve Barthel index scores to atleast 65/100 to improve functional mobility. 7.  Patient will utilize energy conservation techniques during functional activities with minimal verbal cues. OCCUPATIONAL THERAPY LONG TERM GOALS   Initiated 8/6/2018 and to be accomplished within 4 Week(s)    1. Patient will perform ADL's & IADLs with adaptive equipment as needed with modified independence. 2.  Patient will perform toileting task with modified independence with Good safety to reduce falls risk. 3.  Patient will perform all functional transfers utilizing least restrictive device and durable medical equipment as needed with modified independence and Good balance and safety awareness. 4.  Patient will perform standing static/dynamic activity for improved ADL/IADL function with modified independence and Good balance and safety awareness. 5.  Patient will improve Barthel index score to 95/100 to improve independence with mobility. 6. Patient will perform home making tasks and simple meal prep Mod I utilizing energy conservation techniques and good safety awareness.       Therapist: Davina Longo    8/6/2018            [facility]   Occupational Therapy Daily Treatment note      Patient: Anselmo Stone (24 y.o. female) Date: 8/21/2018  Attending Physician: Indira Birmingham MD  Primary Diagnosis: syncope    Treatment Diagnosis  Treatment Diagnosis: muscle weakness  Treatment Diagnosis 2: increased need for assist with self care   Precautions : Precautions at Admission: Fall  Vital Signs:  Vital Signs  Temp: 97.8 °F (36.6 °C)  Temp Source: Oral  Pulse (Heart Rate): (!) 58  Resp Rate: 16  O2 Sat (%): 100 %  BP: 100/48 (nurse notified)  MAP (Calculated): 65  BP 1 Method: Automatic  BP 1 Location: Right arm  BP Patient Position: Sitting     Therapeutic Activities:  Patient presents in room, in bed resting. Patient states she is awaiting MD to discharge her today due to knowing he is back in his office. Attempted functional mobility as well as OOB activity however patient declined due to awaiting MD. Miguel Sung acquired if she could provided patient with UB HEP and T Band to increase UB muscle strength and functional activity tolerance upon discharge home. Patient stated she would like a HEP. Patient reports she has utilize T Band before and will complete once home. Patient/Caregiver Education:    Pt. Maura Gates Education on see above.       ASSESSMENT:  Patient continues to demonstrate the need for skilled Occupational Therapy services to improve dynamic standing balance needed for bathing  Progression toward goals:  [x]      Improving appropriately and progressing toward goals  []      Improving slowly and progressing toward goals  []      Not making progress toward goals and plan of care will be adjusted     Treatment session:   25 minutes    Therapist:    JOSÉ Madrigal,  8/21/2018

## 2018-08-21 NOTE — ROUTINE PROCESS
Bedside and verbal shift report given to DIONNE Freeman RN (oncoming nurse) by MARGIE Luciano LPN (off going nurse). Report included SBAR, MAR and Kardex.

## 2018-08-21 NOTE — ROUTINE PROCESS
Bedside and Verbal shift change report given to AIMEE Gonzalez RN (oncoming nurse) by DIONNE Freeman RN (offgoing nurse). Report included the following information SBAR, Kardex and MAR.

## 2018-08-21 NOTE — PROGRESS NOTES
Home care referral sent to [facility] via [facility] and called to Mayte lopez.     Care Management Interventions  Transition of Care Consult (CM Consult): [address]: Yes  Plan discussed with Pt/Family/Caregiver: Yes  Freedom of Choice Offered: Yes  Discharge Location  Discharge Placement: Home with home health

## 2018-08-22 ENCOUNTER — HOME CARE VISIT (OUTPATIENT)
Dept: HOME HEALTH SERVICES | Facility: HOME HEALTH | Age: 83
End: 2018-08-22

## 2018-08-22 NOTE — DISCHARGE SUMMARY
[address]  MR#: [id number]  : 1925  ACCOUNT #: [de-identified]   ADMIT DATE: 2018  DISCHARGE DATE: 2018    DISCHARGE DIAGNOSES:    1. Deconditioning. 2.  Hypertension. 3.  Coronary disease. 4.  Hypercholesterolemia. 5.  Osteoarthritis. 6.  Polymyalgia rheumatica. 7.  Mild cognitive impairment. 8.  Chronic kidney disease, stage II. 9.  Gastroesophageal reflux disease. 10.  History of gout. 11.  Osteoporosis. 12.  COPD. 13.  As per past medical history. DISCHARGE MEDICATIONS:    1. Tylenol 650 q.6 p.r.n.    2.  Albuterol and Atrovent nebulized treatment 3 times daily as needed. 3.  Aspirin 81 mg a day. 4.  Atenolol 50 mg every 12 hours. 5.  Colace twice daily. 6.  Hyzaar 100/25 one a day. 7.  Protonix 40 mg a day. 8.  MiraLax powder 17 grams a day. 9.  Prednisone 5 mg a day. 10.  Zocor 40 mg daily. 11.  Vitamin D2 50,000 units weekly. 12.  Calcium twice daily. DISPOSITION:  Home with home health -- Patient refused assisted living facility. REASON FOR ADMISSION:  This is a 80-year-old white female with history of the above problems who was admitted to the acute side with syncopal episode. The patient's medications were adjusted and she was deconditioned and felt need skilled rehab. For details, see admission history and physical.      The patient was admitted and received multimodality rehab. Blood pressure was on the low side. Medications were adjusted with the discontinuation of clonidine and hydralazine. She remained stable after that from the hemodynamic and cardiopulmonary standpoint. Patient improved and she is currently ambulating with a walker. She is awake, alert, oriented. Vital signs are relatively stable. Lungs clear. Heart is regular. She is felt to have achieved maximum benefits and will be discharged as above.       SHELBY RIOS MD TIFFANIE Forbes/  D: 08/21/2018 13:39     T: 08/22/2018 09:20  JOB #: [id number]

## 2018-08-26 ENCOUNTER — APPOINTMENT (OUTPATIENT)
Dept: CT IMAGING | Age: 83
End: 2018-08-26
Attending: EMERGENCY MEDICINE
Payer: MEDICARE

## 2018-08-26 ENCOUNTER — HOSPITAL ENCOUNTER (EMERGENCY)
Age: 83
Discharge: HOME | End: 2018-08-26
Attending: EMERGENCY MEDICINE | Admitting: EMERGENCY MEDICINE
Payer: MEDICARE

## 2018-08-26 ENCOUNTER — APPOINTMENT (OUTPATIENT)
Dept: GENERAL RADIOLOGY | Age: 83
End: 2018-08-26
Attending: EMERGENCY MEDICINE
Payer: MEDICARE

## 2018-08-26 VITALS
BODY MASS INDEX: 19.81 KG/M2 | HEIGHT: 64 IN | HEART RATE: 102 BPM | OXYGEN SATURATION: 96 % | SYSTOLIC BLOOD PRESSURE: 129 MMHG | WEIGHT: 116 LBS | TEMPERATURE: 98.1 F | RESPIRATION RATE: 16 BRPM | DIASTOLIC BLOOD PRESSURE: 80 MMHG

## 2018-08-26 DIAGNOSIS — R51.9 ACUTE NONINTRACTABLE HEADACHE, UNSPECIFIED HEADACHE TYPE: Primary | ICD-10-CM

## 2018-08-26 LAB
ANION GAP SERPL CALC-SCNC: 9 MMOL/L (ref 3–18)
BASOPHILS # BLD: 0 K/UL (ref 0–0.1)
BASOPHILS NFR BLD: 0 % (ref 0–2)
BUN SERPL-MCNC: 26 MG/DL (ref 7–18)
BUN/CREAT SERPL: 29 (ref 12–20)
CALCIUM SERPL-MCNC: 10.1 MG/DL (ref 8.5–10.1)
CHLORIDE SERPL-SCNC: 97 MMOL/L (ref 100–108)
CO2 SERPL-SCNC: 32 MMOL/L (ref 21–32)
CREAT SERPL-MCNC: 0.89 MG/DL (ref 0.6–1.3)
DIFFERENTIAL METHOD BLD: ABNORMAL
EOSINOPHIL # BLD: 0 K/UL (ref 0–0.4)
EOSINOPHIL NFR BLD: 0 % (ref 0–5)
ERYTHROCYTE [DISTWIDTH] IN BLOOD BY AUTOMATED COUNT: 13.6 % (ref 11.6–14.5)
GLUCOSE SERPL-MCNC: 90 MG/DL (ref 74–99)
HCT VFR BLD AUTO: 34.2 % (ref 35–45)
HGB BLD-MCNC: 11.4 G/DL (ref 12–16)
LYMPHOCYTES # BLD: 1.8 K/UL (ref 0.9–3.6)
LYMPHOCYTES NFR BLD: 29 % (ref 21–52)
MCH RBC QN AUTO: 28.6 PG (ref 24–34)
MCHC RBC AUTO-ENTMCNC: 33.3 G/DL (ref 31–37)
MCV RBC AUTO: 85.7 FL (ref 74–97)
MONOCYTES # BLD: 0.8 K/UL (ref 0.05–1.2)
MONOCYTES NFR BLD: 13 % (ref 3–10)
NEUTS SEG # BLD: 3.6 K/UL (ref 1.8–8)
NEUTS SEG NFR BLD: 58 % (ref 40–73)
PLATELET # BLD AUTO: 233 K/UL (ref 135–420)
PMV BLD AUTO: 9.9 FL (ref 9.2–11.8)
POTASSIUM SERPL-SCNC: 3.2 MMOL/L (ref 3.5–5.5)
RBC # BLD AUTO: 3.99 M/UL (ref 4.2–5.3)
SODIUM SERPL-SCNC: 138 MMOL/L (ref 136–145)
WBC # BLD AUTO: 6.3 K/UL (ref 4.6–13.2)

## 2018-08-26 PROCEDURE — 74011250637 HC RX REV CODE- 250/637: Performed by: EMERGENCY MEDICINE

## 2018-08-26 PROCEDURE — 80048 BASIC METABOLIC PNL TOTAL CA: CPT | Performed by: NURSE PRACTITIONER

## 2018-08-26 PROCEDURE — 99284 EMERGENCY DEPT VISIT MOD MDM: CPT

## 2018-08-26 PROCEDURE — 70450 CT HEAD/BRAIN W/O DYE: CPT

## 2018-08-26 PROCEDURE — 71045 X-RAY EXAM CHEST 1 VIEW: CPT

## 2018-08-26 PROCEDURE — 85025 COMPLETE CBC W/AUTO DIFF WBC: CPT | Performed by: NURSE PRACTITIONER

## 2018-08-26 RX ORDER — ACETAMINOPHEN 500 MG
1000 TABLET ORAL ONCE
Status: COMPLETED | OUTPATIENT
Start: 2018-08-26 | End: 2018-08-26

## 2018-08-26 RX ADMIN — ACETAMINOPHEN 1000 MG: 500 TABLET, FILM COATED ORAL at 16:10

## 2018-08-26 NOTE — ED PROVIDER NOTES
EMERGENCY DEPARTMENT HISTORY AND PHYSICAL EXAM    3:49 PM      Date: 8/26/2018  Patient Name: Ivette Germain    History of Presenting Illness     Chief Complaint   Patient presents with    Head Pain         History Provided By: Patient    Chief Complaint: Headache  Duration:  Hours  Timing:  Constant  Location: top of her head  Quality:   Severity: Moderate  Modifying Factors: None  Associated Symptoms: Congestion and cough      Additional History (Context): Ivette Germain is a 80 y.o. female with a hx of HTN, arthritis, and CAD who presents with complaints of a constant headache across the top of her head that started around 2200. She describes the headache as severe and intermittently radiating down behind her eyes. Her daughter does report some congestion since discharge from the hospital and the pt reports a cough. She was admitted after a fall. After the fall she started having intermittent headaches and right sided hearing loss for her three week hospital stay per her daughter. She has not taken anything for pain. She denies changes in vision, changes in gait, numbness, fever, and any further complaints. PCP: Justin Bailey MD    Current Outpatient Prescriptions   Medication Sig Dispense Refill    polyethylene glycol (MIRALAX) 17 gram packet Take 1 Packet by mouth nightly. 12 Packet 0    docusate sodium (COLACE) 100 mg capsule Take 1 Cap by mouth two (2) times a day for 90 days. 60 Cap 2    atenolol (TENORMIN) 50 mg tablet Take 1 Tab by mouth every twelve (12) hours. 60 Tab 0    acetaminophen (TYLENOL) 325 mg tablet Take 2 Tabs by mouth every six (6) hours as needed. 60 Tab 0    pantoprazole (PROTONIX) 40 mg tablet Take 1 Tab by mouth Daily (before breakfast). 30 Tab 0    predniSONE (DELTASONE) 10 mg tablet Take 0.5 Tabs by mouth daily.  14 Tab 1    albuterol-ipratropium (DUO-NEB) 2.5 mg-0.5 mg/3 ml nebulizer solution 3 mL by Nebulization route three (3) times daily as needed for Wheezing. 1 Package 1    losartan-hydrochlorothiazide (HYZAAR) 100-25 mg per tablet Take 1 Tab by mouth daily.  simvastatin (ZOCOR) 40 mg tablet Take  by mouth nightly.  aspirin 81 mg tablet Take 81 mg by mouth.  ergocalciferol (VITAMIN D2) 50,000 unit capsule Take 50,000 Units by mouth. Past History     Past Medical History:  Past Medical History:   Diagnosis Date    Arthritis     CAD (coronary artery disease) 2001    3 coronary stents    Cardiac complication     Hypertension     Migraine 3/1/2015    Polymyalgia rheumatica ([address] 2/28/2015       Past Surgical History:  Past Surgical History:   Procedure Laterality Date    CARDIAC SURG PROCEDURE UNLIST      3 stents       Family History:  Family History   Problem Relation Age of Onset    Heart Disease Mother     Heart Disease Father        Social History:  Social History   Substance Use Topics    Smoking status: Former Smoker     Quit date: 1/15/1980    Smokeless tobacco: Never Used    Alcohol use No       Allergies:  No Known Allergies      Review of Systems     Review of Systems   Constitutional: Negative for diaphoresis and fever. HENT: Positive for congestion. Negative for sore throat. Eyes: Negative for pain and itching. Respiratory: Positive for cough. Negative for shortness of breath. Cardiovascular: Negative for chest pain and palpitations. Gastrointestinal: Negative for abdominal pain and diarrhea. Endocrine: Negative for polydipsia and polyuria. Genitourinary: Negative for dysuria and hematuria. Musculoskeletal: Negative for arthralgias, gait problem and myalgias. Skin: Negative for rash and wound. Neurological: Positive for headaches. Negative for seizures, syncope, weakness and numbness. Hematological: Does not bruise/bleed easily. Psychiatric/Behavioral: Negative for agitation and hallucinations.          Physical Exam     Visit Vitals    /80 (BP 1 Location: Right arm, BP Patient Position: At rest)    Pulse (!) 102    Temp 98.1 °F (36.7 °C)    Resp 16    Ht 5' 4\" (1.626 m)    Wt 52.6 kg (116 lb)    SpO2 96%    BMI 19.91 kg/m2       Physical Exam   Constitutional: She is oriented to person, place, and time. She appears well-developed and well-nourished. Frail   HENT:   Head: Normocephalic and atraumatic. Eyes: Conjunctivae are normal. No scleral icterus. Neck: Normal range of motion. Neck supple. No JVD present. Cardiovascular: Normal rate, regular rhythm and normal heart sounds. 4 intact extremity pulses   Pulmonary/Chest: Effort normal.   Enhanced lung sounds in the right upper lobe. Abdominal: Soft. She exhibits no mass. There is no tenderness. Musculoskeletal: Normal range of motion. Lymphadenopathy:     She has no cervical adenopathy. Neurological: She is alert and oriented to person, place, and time. She has normal strength. No cranial nerve deficit or sensory deficit. Skin: Skin is warm and dry. Nursing note and vitals reviewed. Diagnostic Study Results     Labs -  Recent Results (from the past 12 hour(s))   CBC WITH AUTOMATED DIFF    Collection Time: 08/26/18  1:40 PM   Result Value Ref Range    WBC 6.3 4.6 - 13.2 K/uL    RBC 3.99 (L) 4.20 - 5.30 M/uL    HGB 11.4 (L) 12.0 - 16.0 g/dL    HCT 34.2 (L) 35.0 - 45.0 %    MCV 85.7 74.0 - 97.0 FL    MCH 28.6 24.0 - 34.0 PG    MCHC 33.3 31.0 - 37.0 g/dL    RDW 13.6 11.6 - 14.5 %    PLATELET 289 215 - 979 K/uL    MPV 9.9 9.2 - 11.8 FL    NEUTROPHILS 58 40 - 73 %    LYMPHOCYTES 29 21 - 52 %    MONOCYTES 13 (H) 3 - 10 %    EOSINOPHILS 0 0 - 5 %    BASOPHILS 0 0 - 2 %    ABS. NEUTROPHILS 3.6 1.8 - 8.0 K/UL    ABS. LYMPHOCYTES 1.8 0.9 - 3.6 K/UL    ABS. MONOCYTES 0.8 0.05 - 1.2 K/UL    ABS. EOSINOPHILS 0.0 0.0 - 0.4 K/UL    ABS.  BASOPHILS 0.0 0.0 - 0.1 K/UL    DF AUTOMATED     METABOLIC PANEL, BASIC    Collection Time: 08/26/18  1:40 PM   Result Value Ref Range    Sodium 138 136 - 145 mmol/L    Potassium 3.2 (L) 3.5 - 5.5 mmol/L    Chloride 97 (L) 100 - 108 mmol/L    CO2 32 21 - 32 mmol/L    Anion gap 9 3.0 - 18 mmol/L    Glucose 90 74 - 99 mg/dL    BUN 26 (H) 7.0 - 18 MG/DL    Creatinine 0.89 0.6 - 1.3 MG/DL    BUN/Creatinine ratio 29 (H) 12 - 20      GFR est AA >60 >60 ml/min/1.73m2    GFR est non-AA 59 (L) >60 ml/min/1.73m2    Calcium 10.1 8.5 - 10.1 MG/DL       Radiologic Studies -   XR CHEST PORT      No acute process  Per Dr. Deysi Paul   CT HEAD WO CONT     No acute intracranial abnormality per Dr. Viri Reynaga, Radiology         Medical Decision Making   I am the first provider for this patient. I reviewed the vital signs, available nursing notes, past medical history, past surgical history, family history and social history. Vital Signs-Reviewed the patient's vital signs. Pulse Oximetry Analysis -  96% on room air (Interpretation)WNL    Cardiac Monitor:  Rate: 102 BPM  Rhythm:  Sinus Tachycardia     Records Reviewed: Nursing Notes and Old Medical Records (Time of Review: 3:49 PM)    ED Course: Progress Notes, Reevaluation, and Consults:  5:33 PM Pt reevaluated at this time. Discussed results and findings, as well as, diagnosis and plan for discharge. Follow up with doctors/services listed. Return to the emergency department for alarming symptoms. Pt verbalizes understanding and agreement with plan. All questions addressed. Provider Notes (Medical Decision Making): Not consistent with stroke, migraine or cluster headache. Possibly tension headache or unspecified headache. CT to rule out intracranial hemorrhage. Diagnosis     Clinical Impression:   1.  Acute nonintractable headache, unspecified headache type        Disposition: Discharge    Follow-up Information     Follow up With Details Comments Jasmeet Larios MD In 2 days  [address] [facility]   [address] [facility]  [place]  [phone]             Patient's Medications   Start Taking    No medications on file   Continue Taking    ACETAMINOPHEN (TYLENOL) 325 MG TABLET    Take 2 Tabs by mouth every six (6) hours as needed. ALBUTEROL-IPRATROPIUM (DUO-NEB) 2.5 MG-0.5 MG/3 ML NEBULIZER SOLUTION    3 mL by Nebulization route three (3) times daily as needed for Wheezing. ASPIRIN 81 MG TABLET    Take 81 mg by mouth. ATENOLOL (TENORMIN) 50 MG TABLET    Take 1 Tab by mouth every twelve (12) hours. DOCUSATE SODIUM (COLACE) 100 MG CAPSULE    Take 1 Cap by mouth two (2) times a day for 90 days. ERGOCALCIFEROL (VITAMIN D2) 50,000 UNIT CAPSULE    Take 50,000 Units by mouth. LOSARTAN-HYDROCHLOROTHIAZIDE (HYZAAR) 100-25 MG PER TABLET    Take 1 Tab by mouth daily. PANTOPRAZOLE (PROTONIX) 40 MG TABLET    Take 1 Tab by mouth Daily (before breakfast). POLYETHYLENE GLYCOL (MIRALAX) 17 GRAM PACKET    Take 1 Packet by mouth nightly. PREDNISONE (DELTASONE) 10 MG TABLET    Take 0.5 Tabs by mouth daily. SIMVASTATIN (ZOCOR) 40 MG TABLET    Take  by mouth nightly. These Medications have changed    No medications on file   Stop Taking    No medications on file     _______________________________    Attestations:  [employer] [address] [place] [address] acting as a scribe for and in the presence of Dorina Dow MD      August 26, 2018 at 5:34 PM       Provider Attestation:      I personally performed the services described in the documentation, reviewed the documentation, as recorded by the scribe in my presence, and it accurately and completely records my words and actions.  August 26, 2018 at 5:34 PM - Dorina Dow MD    _______________________________

## 2018-08-26 NOTE — DISCHARGE INSTRUCTIONS

## 2018-08-28 ENCOUNTER — HOME CARE VISIT (OUTPATIENT)
Dept: SCHEDULING | Facility: HOME HEALTH | Age: 83
End: 2018-08-28
Payer: MEDICARE

## 2018-08-28 ENCOUNTER — HOME CARE VISIT (OUTPATIENT)
Dept: HOME HEALTH SERVICES | Facility: HOME HEALTH | Age: 83
End: 2018-08-28

## 2018-08-28 VITALS
HEART RATE: 106 BPM | OXYGEN SATURATION: 96 % | DIASTOLIC BLOOD PRESSURE: 68 MMHG | TEMPERATURE: 98.4 F | SYSTOLIC BLOOD PRESSURE: 120 MMHG

## 2018-08-28 PROCEDURE — G0299 HHS/HOSPICE OF RN EA 15 MIN: HCPCS

## 2018-08-28 PROCEDURE — G0151 HHCP-SERV OF PT,EA 15 MIN: HCPCS

## 2018-08-28 PROCEDURE — 400013 HH SOC

## 2018-08-28 PROCEDURE — 3331090002 HH PPS REVENUE DEBIT

## 2018-08-28 PROCEDURE — 3331090001 HH PPS REVENUE CREDIT

## 2018-08-29 ENCOUNTER — HOME CARE VISIT (OUTPATIENT)
Dept: HOME HEALTH SERVICES | Facility: HOME HEALTH | Age: 83
End: 2018-08-29
Payer: MEDICARE

## 2018-08-29 VITALS
TEMPERATURE: 98.4 F | SYSTOLIC BLOOD PRESSURE: 120 MMHG | HEART RATE: 106 BPM | OXYGEN SATURATION: 96 % | RESPIRATION RATE: 18 BRPM | DIASTOLIC BLOOD PRESSURE: 68 MMHG

## 2018-08-29 PROCEDURE — 3331090001 HH PPS REVENUE CREDIT

## 2018-08-29 PROCEDURE — 3331090002 HH PPS REVENUE DEBIT

## 2018-08-30 ENCOUNTER — HOME CARE VISIT (OUTPATIENT)
Dept: SCHEDULING | Facility: HOME HEALTH | Age: 83
End: 2018-08-30
Payer: MEDICARE

## 2018-08-30 VITALS
TEMPERATURE: 97.3 F | OXYGEN SATURATION: 98 % | HEART RATE: 88 BPM | DIASTOLIC BLOOD PRESSURE: 68 MMHG | RESPIRATION RATE: 20 BRPM | SYSTOLIC BLOOD PRESSURE: 118 MMHG

## 2018-08-30 PROCEDURE — 3331090002 HH PPS REVENUE DEBIT

## 2018-08-30 PROCEDURE — 3331090001 HH PPS REVENUE CREDIT

## 2018-08-30 PROCEDURE — G0496 LPN CARE TRAIN/EDU IN HH: HCPCS

## 2018-08-31 PROCEDURE — 3331090002 HH PPS REVENUE DEBIT

## 2018-08-31 PROCEDURE — 3331090001 HH PPS REVENUE CREDIT

## 2018-09-01 PROCEDURE — 3331090001 HH PPS REVENUE CREDIT

## 2018-09-01 PROCEDURE — 3331090002 HH PPS REVENUE DEBIT

## 2018-09-02 PROCEDURE — 3331090001 HH PPS REVENUE CREDIT

## 2018-09-02 PROCEDURE — 3331090002 HH PPS REVENUE DEBIT

## 2018-09-03 PROCEDURE — 3331090001 HH PPS REVENUE CREDIT

## 2018-09-03 PROCEDURE — 3331090002 HH PPS REVENUE DEBIT

## 2018-09-04 ENCOUNTER — HOME CARE VISIT (OUTPATIENT)
Dept: SCHEDULING | Facility: HOME HEALTH | Age: 83
End: 2018-09-04
Payer: MEDICARE

## 2018-09-04 VITALS
RESPIRATION RATE: 20 BRPM | OXYGEN SATURATION: 98 % | TEMPERATURE: 98.5 F | SYSTOLIC BLOOD PRESSURE: 140 MMHG | DIASTOLIC BLOOD PRESSURE: 78 MMHG | HEART RATE: 84 BPM

## 2018-09-04 PROCEDURE — G0496 LPN CARE TRAIN/EDU IN HH: HCPCS

## 2018-09-04 PROCEDURE — 3331090002 HH PPS REVENUE DEBIT

## 2018-09-04 PROCEDURE — 3331090001 HH PPS REVENUE CREDIT

## 2018-09-05 PROCEDURE — 3331090001 HH PPS REVENUE CREDIT

## 2018-09-05 PROCEDURE — 3331090002 HH PPS REVENUE DEBIT

## 2018-09-06 ENCOUNTER — HOME CARE VISIT (OUTPATIENT)
Dept: SCHEDULING | Facility: HOME HEALTH | Age: 83
End: 2018-09-06
Payer: MEDICARE

## 2018-09-06 VITALS
TEMPERATURE: 98.4 F | SYSTOLIC BLOOD PRESSURE: 115 MMHG | DIASTOLIC BLOOD PRESSURE: 62 MMHG | RESPIRATION RATE: 20 BRPM | HEART RATE: 88 BPM | OXYGEN SATURATION: 98 %

## 2018-09-06 PROCEDURE — 3331090001 HH PPS REVENUE CREDIT

## 2018-09-06 PROCEDURE — G0496 LPN CARE TRAIN/EDU IN HH: HCPCS

## 2018-09-06 PROCEDURE — 3331090002 HH PPS REVENUE DEBIT

## 2018-09-07 PROCEDURE — 3331090001 HH PPS REVENUE CREDIT

## 2018-09-07 PROCEDURE — 3331090002 HH PPS REVENUE DEBIT

## 2018-09-08 PROCEDURE — 3331090002 HH PPS REVENUE DEBIT

## 2018-09-08 PROCEDURE — 3331090001 HH PPS REVENUE CREDIT

## 2018-09-09 PROCEDURE — 3331090001 HH PPS REVENUE CREDIT

## 2018-09-09 PROCEDURE — 3331090002 HH PPS REVENUE DEBIT

## 2018-09-10 ENCOUNTER — APPOINTMENT (OUTPATIENT)
Dept: GENERAL RADIOLOGY | Age: 83
End: 2018-09-10
Attending: NURSE PRACTITIONER
Payer: MEDICARE

## 2018-09-10 ENCOUNTER — HOSPITAL ENCOUNTER (EMERGENCY)
Age: 83
Discharge: HOME | End: 2018-09-10
Attending: EMERGENCY MEDICINE
Payer: MEDICARE

## 2018-09-10 VITALS
RESPIRATION RATE: 18 BRPM | BODY MASS INDEX: 19.83 KG/M2 | TEMPERATURE: 98.3 F | OXYGEN SATURATION: 100 % | HEIGHT: 65 IN | HEART RATE: 102 BPM | SYSTOLIC BLOOD PRESSURE: 133 MMHG | DIASTOLIC BLOOD PRESSURE: 80 MMHG | WEIGHT: 119 LBS

## 2018-09-10 DIAGNOSIS — S90.31XA CONTUSION OF RIGHT FOOT, INITIAL ENCOUNTER: Primary | ICD-10-CM

## 2018-09-10 DIAGNOSIS — M79.671 FOOT PAIN, RIGHT: ICD-10-CM

## 2018-09-10 PROCEDURE — 99283 EMERGENCY DEPT VISIT LOW MDM: CPT

## 2018-09-10 PROCEDURE — 3331090001 HH PPS REVENUE CREDIT

## 2018-09-10 PROCEDURE — 75810000053 HC SPLINT APPLICATION

## 2018-09-10 PROCEDURE — 3331090002 HH PPS REVENUE DEBIT

## 2018-09-10 PROCEDURE — 73630 X-RAY EXAM OF FOOT: CPT

## 2018-09-10 PROCEDURE — 73610 X-RAY EXAM OF ANKLE: CPT

## 2018-09-10 PROCEDURE — 74011250637 HC RX REV CODE- 250/637: Performed by: NURSE PRACTITIONER

## 2018-09-10 RX ORDER — ACETAMINOPHEN AND CODEINE PHOSPHATE 300; 30 MG/1; MG/1
1 TABLET ORAL
Qty: 12 TAB | Refills: 0 | Status: SHIPPED | OUTPATIENT
Start: 2018-09-10 | End: 2019-01-30

## 2018-09-10 RX ORDER — HYDROCODONE BITARTRATE AND ACETAMINOPHEN 5; 325 MG/1; MG/1
1 TABLET ORAL
Status: COMPLETED | OUTPATIENT
Start: 2018-09-10 | End: 2018-09-10

## 2018-09-10 RX ADMIN — HYDROCODONE BITARTRATE AND ACETAMINOPHEN 1 TABLET: 5; 325 TABLET ORAL at 21:00

## 2018-09-11 ENCOUNTER — HOME CARE VISIT (OUTPATIENT)
Dept: SCHEDULING | Facility: HOME HEALTH | Age: 83
End: 2018-09-11
Payer: MEDICARE

## 2018-09-11 VITALS
SYSTOLIC BLOOD PRESSURE: 110 MMHG | TEMPERATURE: 99 F | OXYGEN SATURATION: 98 % | DIASTOLIC BLOOD PRESSURE: 58 MMHG | HEART RATE: 99 BPM

## 2018-09-11 VITALS
HEART RATE: 96 BPM | OXYGEN SATURATION: 98 % | DIASTOLIC BLOOD PRESSURE: 54 MMHG | SYSTOLIC BLOOD PRESSURE: 110 MMHG | TEMPERATURE: 99 F | RESPIRATION RATE: 20 BRPM

## 2018-09-11 PROCEDURE — 3331090001 HH PPS REVENUE CREDIT

## 2018-09-11 PROCEDURE — 3331090002 HH PPS REVENUE DEBIT

## 2018-09-11 PROCEDURE — G0151 HHCP-SERV OF PT,EA 15 MIN: HCPCS

## 2018-09-11 PROCEDURE — G0496 LPN CARE TRAIN/EDU IN HH: HCPCS

## 2018-09-11 NOTE — ED TRIAGE NOTES
Heavy wooden cane fell on right foot 2 days ago. Continued pain and swelling in right foot and ankle.

## 2018-09-11 NOTE — ED PROVIDER NOTES
HPI Comments: 8:08 PM Demetrius Cortez is a 80 y.o. female with h/o HTN, arthritis, CAD, and polymyalgia rheumatica who presents to ED complaining of right ankle and foot pain after she accidentally dropped her wooden cane on her foot about 2 days ago. Though the pain would improve on its own but it has been worsening. Now her pain is exacerbated with movement and weight bearing. Has not taken anything for pain but has tried wrapping her ankle with no relief. Denies deformity, weakness, numbness, or any other associated sx. No other complaints or concerns. PCP: Fiona Nelson MD 
 
 
The history is provided by the patient. History limited by: No communication barrier. No  was used. Past Medical History:  
Diagnosis Date  Arthritis  CAD (coronary artery disease) 2001  
 3 coronary stents  Cardiac complication  Hypertension  Migraine 3/1/2015  Polymyalgia rheumatica ([facility] [address] 2/28/2015 Past Surgical History:  
Procedure Laterality Date  CARDIAC SURG PROCEDURE UNLIST    
 3 stents Family History:  
Problem Relation Age of Onset  Heart Disease Mother  Heart Disease Father Social History Social History  Marital status:  Spouse name: N/A  
 Number of children: N/A  
 Years of education: N/A Occupational History  Not on file. Social History Main Topics  Smoking status: Former Smoker Quit date: 1/15/1980  Smokeless tobacco: Never Used  Alcohol use No  
 Drug use: Not on file  Sexual activity: Not on file Other Topics Concern  Not on file Social History Narrative ALLERGIES: Review of patient's allergies indicates no known allergies. Review of Systems Constitutional: Negative. Negative for chills and fever. HENT: Negative. Eyes: Negative. Respiratory: Negative. Negative for shortness of breath. Cardiovascular: Negative. Negative for chest pain. Gastrointestinal: Negative. Negative for nausea and vomiting. Endocrine: Negative. Genitourinary: Negative. Musculoskeletal: Positive for arthralgias and joint swelling. Skin: Positive for color change. Allergic/Immunologic: Negative. Neurological: Negative. Negative for weakness and numbness. Hematological: Negative. Psychiatric/Behavioral: Negative. All other systems reviewed and are negative. Vitals:  
 09/10/18 2004 BP: 137/82 Pulse: (!) 108 Resp: 18 Temp: 98.3 °F (36.8 °C) SpO2: 100% Weight: 54 kg (119 lb) Height: 5' 5\" (1.651 m) Physical Exam  
Constitutional: She is oriented to person, place, and time. She appears well-developed and well-nourished. No distress. HENT:  
Head: Normocephalic and atraumatic. Eyes: EOM are normal. Pupils are equal, round, and reactive to light. Neck: Normal range of motion. Neck supple. Cardiovascular: Normal rate, regular rhythm and normal heart sounds. Pulmonary/Chest: Effort normal and breath sounds normal. No respiratory distress. She has no wheezes. She has no rales. Abdominal: Soft. Bowel sounds are normal. There is no tenderness. Genitourinary:  
Genitourinary Comments: NE  
Musculoskeletal: Normal range of motion. Right foot and ankle edema. Distal pulses intact. Non focal tenderness diffusely in the foot and ankle. Neurological: She is alert and oriented to person, place, and time. No cranial nerve deficit. She exhibits normal muscle tone. Coordination normal.  
Skin: Skin is warm and dry. Psychiatric: She has a normal mood and affect. Nursing note and vitals reviewed. MDM Number of Diagnoses or Management Options Contusion of right foot, initial encounter: Foot pain, right:  
Diagnosis management comments: CONSULT:  Discussed foot with the radiology resident. degen changes without acute findings. Baltazar Dial NP  9:57 PM 
 
 
 
 
 
ED Course Splint, Short Leg 
 Date/Time: 9/10/2018 9:57 PM 
Performed by: Heidi Gill Authorized by: Heidi Gill Consent:  
  Consent obtained:  Verbal 
  Consent given by:  Patient Risks discussed:  Pain Alternatives discussed:  No treatment Pre-procedure details:  
  Sensation:  Normal 
Procedure details:  
  Laterality:  Right Splint type:  Short leg Supplies:  Ortho-Glass Post-procedure details:  
  Pain:  Improved Sensation:  Normal 
  Patient tolerance of procedure: Tolerated well, no immediate complications Scribe Attestation Fay Daniel acting as a scribe for and in the presence of [employer] September 10, 2018 at 8:07 PM 
    
Provider Attestation:     
I personally performed the services described in the documentation, reviewed the documentation, as recorded by the scribe in my presence, and it accurately and completely records my words and actions. September 10, 2018 at 8:07 PM - [employer] Diagnosis: 1. Contusion of right foot, initial encounter 2. Foot pain, right Disposition:   Discharged to Home. Follow-up Information Follow up With Details Comments Contact Info Ever Melchor MD Call in the morning to arrange follow up    [address] [place] [address] Internal Medicine [facility] [phone] 
[phone] Patient's Medications Start Taking ACETAMINOPHEN-CODEINE (TYLENOL #3) 300-30 MG PER TABLET    Take 1 Tab by mouth every six (6) hours as needed for Pain. Max Daily Amount: 4 Tabs. Continue Taking ACETAMINOPHEN (TYLENOL PO)    300 mg by Buccal route two (2) times a day. Indications: pain ACETAMINOPHEN (TYLENOL) 325 MG TABLET    Take 2 Tabs by mouth every six (6) hours as needed. ALBUTEROL-IPRATROPIUM (DUO-NEB) 2.5 MG-0.5 MG/3 ML NEBULIZER SOLUTION    3 mL by Nebulization route three (3) times daily as needed for Wheezing. ASPIRIN 81 MG TABLET    Take 81 mg by mouth. ATENOLOL (TENORMIN) 50 MG TABLET    Take 1 Tab by mouth every twelve (12) hours. CIPROFLOXACIN HCL (CILOXAN) 0.3 % OPHTHALMIC SOLUTION    Administer 2 Drops to left eye three (3) times daily. CLONIDINE HCL (CATAPRES) 0.1 MG TABLET    Take 0.1 mg by mouth two (2) times a day. DOCUSATE SODIUM (COLACE) 100 MG CAPSULE    Take 1 Cap by mouth two (2) times a day for 90 days. ERGOCALCIFEROL (VITAMIN D2) 50,000 UNIT CAPSULE    Take 50,000 Units by mouth every seven (7) days. GUAIFENESIN/DEXTROMETHORPHAN (GUAIFENESIN DM PO)    Take 10 mL by mouth two (2) times daily as needed (cough). makes nauseated not taking LEVOFLOXACIN (LEVAQUIN) 500 MG TABLET    Take 500 mg by mouth daily. x5 days LOSARTAN-HYDROCHLOROTHIAZIDE (HYZAAR) 100-25 MG PER TABLET    Take 1 Tab by mouth daily. METOPROLOL TARTRATE (LOPRESSOR) 100 MG IR TABLET    Take 100 mg by mouth daily. PANTOPRAZOLE (PROTONIX) 40 MG TABLET    Take 1 Tab by mouth Daily (before breakfast). POLYETHYLENE GLYCOL (MIRALAX) 17 GRAM PACKET    Take 1 Packet by mouth nightly. PREDNISONE (DELTASONE) 10 MG TABLET    Take 0.5 Tabs by mouth daily. SIMVASTATIN (ZOCOR) 40 MG TABLET    Take  by mouth nightly. These Medications have changed No medications on file Stop Taking No medications on file

## 2018-09-11 NOTE — DISCHARGE INSTRUCTIONS
Bruises: Care Instructions  Your Care Instructions    Bruises occur when small blood vessels under the skin tear or rupture, most often from a twist, bump, or fall. Blood leaks into tissues under the skin and causes a black-and-blue spot that often turns colors, including purplish black, reddish blue, or yellowish green, as the bruise heals. Bruises hurt, but most are not serious and will go away on their own within 2 to 4 weeks. Sometimes, gravity causes them to spread down the body. A leg bruise usually will take longer to heal than a bruise on the face or arms. Follow-up care is a key part of your treatment and safety. Be sure to make and go to all appointments, and call your doctor if you are having problems. It's also a good idea to know your test results and keep a list of the medicines you take. How can you care for yourself at home? · Take pain medicines exactly as directed. ¨ If the doctor gave you a prescription medicine for pain, take it as prescribed. ¨ If you are not taking a prescription pain medicine, ask your doctor if you can take an over-the-counter medicine. · Put ice or a cold pack on the area for 10 to 20 minutes at a time. Put a thin cloth between the ice and your skin. · If you can, prop up the bruised area on pillows as much as possible for the next few days. Try to keep the bruise above the level of your heart. When should you call for help? Call your doctor now or seek immediate medical care if:    · You have signs of infection, such as:  ¨ Increased pain, swelling, warmth, or redness. ¨ Red streaks leading from the bruise. ¨ Pus draining from the bruise. ¨ A fever.     · You have a bruise on your leg and signs of a blood clot, such as:  ¨ Increasing redness and swelling along with warmth, tenderness, and pain in the bruised area. ¨ Pain in your calf, back of the knee, thigh, or groin.   ¨ Redness and swelling in your leg or groin.     · Your pain gets worse.   [place] closely for changes in your health, and be sure to contact your doctor if:    · You do not get better as expected. Where can you learn more? Go to http://abena-tameka.info/. Enter [phone] in the search box to learn more about \"Bruises: Care Instructions. \"  Current as of: November 20, 2017  Content Version: 11.7  © 8021-2297 [employer]. Care instructions adapted under license by [employer] (which disclaims liability or warranty for this information). If you have questions about a medical condition or this instruction, always ask your healthcare professional. [place] [address] any warranty or liability for your use of this information. Contusion: Care Instructions  Your Care Instructions  Contusion is the medical term for a bruise. It is the result of a direct blow or an impact, such as a fall. Contusions are common sports injuries. Most people think of a bruise as a black-and-blue spot. This happens when small blood vessels get torn and leak blood under the skin. But bones, muscles, and organs can also get bruised. This may damage deep tissues but not cause a bruise you can see. The doctor will do a physical exam to find the location of your contusion. You may also have tests to make sure you do not have a more serious injury, such as a broken bone or nerve damage. These may include X-rays or other imaging tests like a CT scan or MRI. Deep-tissue contusions may cause pain and swelling. But if there is no serious damage, they will often get better in a few weeks with home treatment. The doctor has checked you carefully, but problems can develop later. If you notice any problems or new symptoms, get medical treatment right away. Follow-up care is a key part of your treatment and safety. Be sure to make and go to all appointments, and call your doctor if you are having problems.  It's also a good idea to know your test results and keep a list of the medicines you take. How can you care for yourself at home? · Put ice or a cold pack on the sore area for 10 to 20 minutes at a time to stop swelling. Put a thin cloth between the ice pack and your skin. · Be safe with medicines. Read and follow all instructions on the label. ¨ If the doctor gave you a prescription medicine for pain, take it as prescribed. ¨ If you are not taking a prescription pain medicine, ask your doctor if you can take an over-the-counter medicine. · If you can, prop up the sore area on pillows as much as possible for the next few days. Try to keep the sore area above the level of your heart. When should you call for help? Call your doctor now or seek immediate medical care if:  · Your pain gets worse. · You have new or worse swelling. · You have tingling, weakness, or numbness in the area near the contusion. · The area near the contusion is cold or pale. Watch closely for changes in your health, and be sure to contact your doctor if:  · You do not get better as expected. Where can you learn more? Go to [email or web address]  Enter [id number] in the search box to learn more about \"Contusion: Care Instructions. \"   © 4360-8801 [employer]. Care instructions adapted under license by [facility] [employer] (which disclaims liability or warranty for this information). This care instruction is for use with your licensed healthcare professional. If you have questions about a medical condition or this instruction, always ask your healthcare professional. [place] [address] any warranty or liability for your use of this information. Content Version: 12.6.522833; Current as of: May 22, 2015      [employer] Activation    Thank you for requesting access to [employer]. Please follow the instructions below to securely access and download your online medical record.  [employer] allows you to send messages to your doctor, view your test results, renew your prescriptions, schedule appointments, and more. How Do I Sign Up? 1. In your internet browser, go to [email or web address]  2. Click on the First Time User? Click Here link in the Sign In box. You will be redirect to the New Member Sign Up page. 3. Enter your [employer] Access Code exactly as it appears below. You will not need to use this code after youve completed the sign-up process. If you do not sign up before the expiration date, you must request a new code. [employer] Access Code: [email or web address]  Expires: 10/29/2018  7:53 PM (This is the date your [employer] access code will )    4. Enter the last four digits of your Social Security Number (xxxx) and Date of Birth (mm/dd/yyyy) as indicated and click Submit. You will be taken to the next sign-up page. 5. Create a [employer] ID. This will be your [employer] login ID and cannot be changed, so think of one that is secure and easy to remember. 6. Create a [employer] password. You can change your password at any time. 7. Enter your Password Reset Question and Answer. This can be used at a later time if you forget your password. 8. Enter your e-mail address. You will receive e-mail notification when new information is available in [address] 9. Click Sign Up. You can now view and download portions of your medical record. 10. Click the Download Summary menu link to download a portable copy of your medical information. Additional Information    If you have questions, please visit the Frequently Asked Questions section of the [employer] website at [email or web address] Remember, [employer] is NOT to be used for urgent needs. For medical emergencies, dial 911.

## 2018-09-12 PROCEDURE — 3331090002 HH PPS REVENUE DEBIT

## 2018-09-12 PROCEDURE — 3331090001 HH PPS REVENUE CREDIT

## 2018-09-13 ENCOUNTER — HOME CARE VISIT (OUTPATIENT)
Dept: SCHEDULING | Facility: HOME HEALTH | Age: 83
End: 2018-09-13
Payer: MEDICARE

## 2018-09-13 VITALS
RESPIRATION RATE: 20 BRPM | HEART RATE: 84 BPM | TEMPERATURE: 98.3 F | OXYGEN SATURATION: 99 % | DIASTOLIC BLOOD PRESSURE: 60 MMHG | SYSTOLIC BLOOD PRESSURE: 122 MMHG

## 2018-09-13 PROCEDURE — 3331090001 HH PPS REVENUE CREDIT

## 2018-09-13 PROCEDURE — G0496 LPN CARE TRAIN/EDU IN HH: HCPCS

## 2018-09-13 PROCEDURE — 3331090002 HH PPS REVENUE DEBIT

## 2018-09-14 PROCEDURE — 3331090002 HH PPS REVENUE DEBIT

## 2018-09-14 PROCEDURE — 3331090001 HH PPS REVENUE CREDIT

## 2018-09-15 PROCEDURE — 3331090001 HH PPS REVENUE CREDIT

## 2018-09-15 PROCEDURE — 3331090002 HH PPS REVENUE DEBIT

## 2018-09-16 PROCEDURE — 3331090002 HH PPS REVENUE DEBIT

## 2018-09-16 PROCEDURE — 3331090001 HH PPS REVENUE CREDIT

## 2018-09-17 PROCEDURE — 3331090001 HH PPS REVENUE CREDIT

## 2018-09-17 PROCEDURE — 3331090002 HH PPS REVENUE DEBIT

## 2018-09-18 ENCOUNTER — HOME CARE VISIT (OUTPATIENT)
Dept: SCHEDULING | Facility: HOME HEALTH | Age: 83
End: 2018-09-18
Payer: MEDICARE

## 2018-09-18 VITALS
DIASTOLIC BLOOD PRESSURE: 68 MMHG | OXYGEN SATURATION: 98 % | HEART RATE: 80 BPM | SYSTOLIC BLOOD PRESSURE: 122 MMHG | RESPIRATION RATE: 20 BRPM | TEMPERATURE: 99 F

## 2018-09-18 PROCEDURE — G0496 LPN CARE TRAIN/EDU IN HH: HCPCS

## 2018-09-18 PROCEDURE — G0157 HHC PT ASSISTANT EA 15: HCPCS

## 2018-09-18 PROCEDURE — 3331090001 HH PPS REVENUE CREDIT

## 2018-09-18 PROCEDURE — 3331090002 HH PPS REVENUE DEBIT

## 2018-09-19 PROCEDURE — 3331090001 HH PPS REVENUE CREDIT

## 2018-09-19 PROCEDURE — 3331090002 HH PPS REVENUE DEBIT

## 2018-09-20 ENCOUNTER — HOSPITAL ENCOUNTER (EMERGENCY)
Age: 83
Discharge: HOME | End: 2018-09-20
Attending: EMERGENCY MEDICINE | Admitting: EMERGENCY MEDICINE
Payer: MEDICARE

## 2018-09-20 ENCOUNTER — HOME CARE VISIT (OUTPATIENT)
Dept: SCHEDULING | Facility: HOME HEALTH | Age: 83
End: 2018-09-20
Payer: MEDICARE

## 2018-09-20 ENCOUNTER — APPOINTMENT (OUTPATIENT)
Dept: GENERAL RADIOLOGY | Age: 83
End: 2018-09-20
Attending: EMERGENCY MEDICINE
Payer: MEDICARE

## 2018-09-20 ENCOUNTER — HOME CARE VISIT (OUTPATIENT)
Dept: HOME HEALTH SERVICES | Facility: HOME HEALTH | Age: 83
End: 2018-09-20
Payer: MEDICARE

## 2018-09-20 VITALS — OXYGEN SATURATION: 98 % | DIASTOLIC BLOOD PRESSURE: 68 MMHG | HEART RATE: 80 BPM | SYSTOLIC BLOOD PRESSURE: 122 MMHG

## 2018-09-20 VITALS
SYSTOLIC BLOOD PRESSURE: 185 MMHG | HEART RATE: 93 BPM | OXYGEN SATURATION: 100 % | TEMPERATURE: 97.7 F | DIASTOLIC BLOOD PRESSURE: 90 MMHG | RESPIRATION RATE: 13 BRPM

## 2018-09-20 DIAGNOSIS — E87.6 HYPOKALEMIA: ICD-10-CM

## 2018-09-20 DIAGNOSIS — R42 DIZZINESS: Primary | ICD-10-CM

## 2018-09-20 DIAGNOSIS — E83.42 HYPOMAGNESEMIA: ICD-10-CM

## 2018-09-20 LAB
ANION GAP SERPL CALC-SCNC: 8 MMOL/L (ref 3–18)
BASOPHILS # BLD: 0 K/UL (ref 0–0.1)
BASOPHILS NFR BLD: 0 % (ref 0–2)
BUN SERPL-MCNC: 25 MG/DL (ref 7–18)
BUN/CREAT SERPL: 26 (ref 12–20)
CALCIUM SERPL-MCNC: 9.1 MG/DL (ref 8.5–10.1)
CHLORIDE SERPL-SCNC: 103 MMOL/L (ref 100–108)
CK MB CFR SERPL CALC: 2.3 % (ref 0–4)
CK MB SERPL-MCNC: 1.1 NG/ML (ref 5–25)
CK SERPL-CCNC: 47 U/L (ref 26–192)
CO2 SERPL-SCNC: 31 MMOL/L (ref 21–32)
CREAT SERPL-MCNC: 0.98 MG/DL (ref 0.6–1.3)
DIFFERENTIAL METHOD BLD: ABNORMAL
EOSINOPHIL # BLD: 0.1 K/UL (ref 0–0.4)
EOSINOPHIL NFR BLD: 2 % (ref 0–5)
ERYTHROCYTE [DISTWIDTH] IN BLOOD BY AUTOMATED COUNT: 14.4 % (ref 11.6–14.5)
GLUCOSE SERPL-MCNC: 90 MG/DL (ref 74–99)
HCT VFR BLD AUTO: 30 % (ref 35–45)
HGB BLD-MCNC: 9.9 G/DL (ref 12–16)
LYMPHOCYTES # BLD: 2.1 K/UL (ref 0.9–3.6)
LYMPHOCYTES NFR BLD: 27 % (ref 21–52)
MAGNESIUM SERPL-MCNC: 0.8 MG/DL (ref 1.6–2.6)
MCH RBC QN AUTO: 28.2 PG (ref 24–34)
MCHC RBC AUTO-ENTMCNC: 33 G/DL (ref 31–37)
MCV RBC AUTO: 85.5 FL (ref 74–97)
MONOCYTES # BLD: 0.8 K/UL (ref 0.05–1.2)
MONOCYTES NFR BLD: 10 % (ref 3–10)
NEUTS SEG # BLD: 4.6 K/UL (ref 1.8–8)
NEUTS SEG NFR BLD: 61 % (ref 40–73)
PLATELET # BLD AUTO: 339 K/UL (ref 135–420)
PMV BLD AUTO: 9.6 FL (ref 9.2–11.8)
POTASSIUM SERPL-SCNC: 3.3 MMOL/L (ref 3.5–5.5)
RBC # BLD AUTO: 3.51 M/UL (ref 4.2–5.3)
SODIUM SERPL-SCNC: 142 MMOL/L (ref 136–145)
TROPONIN I SERPL-MCNC: 0.02 NG/ML (ref 0–0.04)
WBC # BLD AUTO: 7.6 K/UL (ref 4.6–13.2)

## 2018-09-20 PROCEDURE — 3331090002 HH PPS REVENUE DEBIT

## 2018-09-20 PROCEDURE — 99285 EMERGENCY DEPT VISIT HI MDM: CPT

## 2018-09-20 PROCEDURE — 96361 HYDRATE IV INFUSION ADD-ON: CPT

## 2018-09-20 PROCEDURE — 74011250636 HC RX REV CODE- 250/636: Performed by: EMERGENCY MEDICINE

## 2018-09-20 PROCEDURE — 93005 ELECTROCARDIOGRAM TRACING: CPT

## 2018-09-20 PROCEDURE — 85025 COMPLETE CBC W/AUTO DIFF WBC: CPT | Performed by: EMERGENCY MEDICINE

## 2018-09-20 PROCEDURE — G0496 LPN CARE TRAIN/EDU IN HH: HCPCS

## 2018-09-20 PROCEDURE — 82550 ASSAY OF CK (CPK): CPT | Performed by: EMERGENCY MEDICINE

## 2018-09-20 PROCEDURE — 96365 THER/PROPH/DIAG IV INF INIT: CPT

## 2018-09-20 PROCEDURE — 83735 ASSAY OF MAGNESIUM: CPT | Performed by: EMERGENCY MEDICINE

## 2018-09-20 PROCEDURE — 71045 X-RAY EXAM CHEST 1 VIEW: CPT

## 2018-09-20 PROCEDURE — 74011250637 HC RX REV CODE- 250/637: Performed by: EMERGENCY MEDICINE

## 2018-09-20 PROCEDURE — 3331090001 HH PPS REVENUE CREDIT

## 2018-09-20 PROCEDURE — 80048 BASIC METABOLIC PNL TOTAL CA: CPT | Performed by: EMERGENCY MEDICINE

## 2018-09-20 RX ORDER — POTASSIUM CHLORIDE 20 MEQ/1
40 TABLET, EXTENDED RELEASE ORAL
Status: COMPLETED | OUTPATIENT
Start: 2018-09-20 | End: 2018-09-20

## 2018-09-20 RX ORDER — MAGNESIUM SULFATE HEPTAHYDRATE 500 MG/ML
2 INJECTION, SOLUTION INTRAMUSCULAR; INTRAVENOUS
Status: DISCONTINUED | OUTPATIENT
Start: 2018-09-20 | End: 2018-09-20 | Stop reason: CLARIF

## 2018-09-20 RX ORDER — MAGNESIUM SULFATE HEPTAHYDRATE 40 MG/ML
2 INJECTION, SOLUTION INTRAVENOUS ONCE
Status: COMPLETED | OUTPATIENT
Start: 2018-09-20 | End: 2018-09-20

## 2018-09-20 RX ORDER — POTASSIUM CHLORIDE 20 MEQ/1
20 TABLET, EXTENDED RELEASE ORAL 3 TIMES DAILY
Qty: 9 TAB | Refills: 0 | Status: SHIPPED | OUTPATIENT
Start: 2018-09-20 | End: 2018-09-23

## 2018-09-20 RX ADMIN — SODIUM CHLORIDE 1000 ML: 900 INJECTION, SOLUTION INTRAVENOUS at 15:46

## 2018-09-20 RX ADMIN — POTASSIUM CHLORIDE 40 MEQ: 20 TABLET, EXTENDED RELEASE ORAL at 17:00

## 2018-09-20 RX ADMIN — MAGNESIUM SULFATE HEPTAHYDRATE 2 G: 40 INJECTION, SOLUTION INTRAVENOUS at 17:02

## 2018-09-20 NOTE — ED PROVIDER NOTES
[facility] EMERGENCY DEPT 
 
 
3:16 PM 
 
Date: 9/20/2018 Patient Name: Daryle Better History of Presenting Illness Chief Complaint Patient presents with  Dehydration History Provided By: Patient Chief Complaint: Dizziness Duration: \"this morning\" Hours Timing:  Intermittent Location: Generalized Quality: N/A Severity: Moderate Modifying Factors: N/A Associated Symptoms: Nausea 80 y.o. female with noted past medical history who presents to the emergency department with intermittent, moderate dizziness onset x \"this morning\" with associated nausea. Pt states it was as if the floor was rising up to her and it would last for about a couple minutes when it occurred. She believes it may be from dehydration. Pt at bedside does not feel dizzy. Denies falling. No other complaints. Nursing notes regarding the HPI and triage nursing notes were reviewed. Prior medical records were reviewed. Current Facility-Administered Medications Medication Dose Route Frequency Provider Last Rate Last Dose  magnesium sulfate 2 g/50 ml IVPB (premix or compounded)  2 g IntraVENous Taiwo Parnell MD      
 potassium chloride (K-DUR, KLOR-CON) SR tablet 40 mEq  40 mEq Oral NOW Molly Estrada MD      
 
Current Outpatient Prescriptions Medication Sig Dispense Refill  acetaminophen-codeine (TYLENOL #3) 300-30 mg per tablet Take 1 Tab by mouth every six (6) hours as needed for Pain. Max Daily Amount: 4 Tabs. 12 Tab 0  cloNIDine HCl (CATAPRES) 0.1 mg tablet Take 0.1 mg by mouth two (2) times a day.  metoprolol tartrate (LOPRESSOR) 100 mg IR tablet Take 100 mg by mouth daily.  ciprofloxacin HCl (CILOXAN) 0.3 % ophthalmic solution Administer 2 Drops to left eye three (3) times daily.  acetaminophen (TYLENOL PO) 300 mg by Buccal route two (2) times a day.  Indications: pain    
 guaifenesin/dextromethorphan (GUAIFENESIN DM PO) Take 10 mL by mouth two (2) times daily as needed (cough). makes nauseated not taking  polyethylene glycol (MIRALAX) 17 gram packet Take 1 Packet by mouth nightly. 12 Packet 0  
 docusate sodium (COLACE) 100 mg capsule Take 1 Cap by mouth two (2) times a day for 90 days. 60 Cap 2  
 atenolol (TENORMIN) 50 mg tablet Take 1 Tab by mouth every twelve (12) hours. (Patient not taking: Reported on 9/4/2018) 60 Tab 0  
 acetaminophen (TYLENOL) 325 mg tablet Take 2 Tabs by mouth every six (6) hours as needed. 60 Tab 0  
 pantoprazole (PROTONIX) 40 mg tablet Take 1 Tab by mouth Daily (before breakfast). 30 Tab 0  predniSONE (DELTASONE) 10 mg tablet Take 0.5 Tabs by mouth daily. 14 Tab 1  
 albuterol-ipratropium (DUO-NEB) 2.5 mg-0.5 mg/3 ml nebulizer solution 3 mL by Nebulization route three (3) times daily as needed for Wheezing. 1 Package 1  
 losartan-hydrochlorothiazide (HYZAAR) 100-25 mg per tablet Take 1 Tab by mouth daily.  simvastatin (ZOCOR) 40 mg tablet Take  by mouth nightly.  aspirin 81 mg tablet Take 81 mg by mouth.  ergocalciferol (VITAMIN D2) 50,000 unit capsule Take 50,000 Units by mouth every seven (7) days. Past History Past Medical History: 
Past Medical History:  
Diagnosis Date  Arthritis  CAD (coronary artery disease) 2001  
 3 coronary stents  Cardiac complication  Hypertension  Migraine 3/1/2015  Polymyalgia rheumatica ([address] 2/28/2015 Past Surgical History: 
Past Surgical History:  
Procedure Laterality Date  CARDIAC SURG PROCEDURE UNLIST    
 3 stents Family History: 
Family History Problem Relation Age of Onset  Heart Disease Mother  Heart Disease Father Social History: 
Social History Substance Use Topics  Smoking status: Former Smoker Quit date: 1/15/1980  Smokeless tobacco: Never Used  Alcohol use No  
 
 
Allergies: 
No Known Allergies Patient's primary care provider (as noted in [facility]):  Maru Cooney MD 
 
Review of Systems Constitutional: Negative for diaphoresis. HENT: Negative for congestion. Eyes: Negative for discharge. Respiratory: Negative for stridor. Cardiovascular: Negative for palpitations. Gastrointestinal: Positive for nausea. Negative for diarrhea. Genitourinary: Negative for flank pain. Musculoskeletal: Negative for back pain. Neurological: Positive for dizziness. Negative for weakness. Psychiatric/Behavioral: Negative for hallucinations. All other systems reviewed and are negative. Visit Vitals  /65  Pulse 86  Temp 97.7 °F (36.5 °C)  Resp 17  SpO2 100% PHYSICAL EXAM: 
 
CONSTITUTIONAL:  Alert, in no apparent distress;  well developed;  well nourished. HEAD:  Normocephalic, atraumatic. EYES:  EOMI. Non-icteric sclera. Normal conjunctiva. ENTM:  Nose:  no rhinorrhea. Throat:  no erythema or exudate, mucous membranes moist. 
NECK:  No JVD. Supple RESPIRATORY:  Chest clear, equal breath sounds, good air movement. CARDIOVASCULAR:  Regular rate and rhythm. No murmurs, rubs, or gallops. GI:  Normal bowel sounds, abdomen soft and non-tender. No rebound or guarding. BACK:  Non-tender. UPPER EXT:  Normal inspection. LOWER EXT:  No edema, no calf tenderness. Distal pulses intact. NEURO:  Moves all four extremities. Normal motor exam and sensation in all four extremities. Normal CN II-XII exam.  Normal bilateral finger-to-nose exam.   
 
SKIN:  No rashes;  Normal for age. PSYCH:  Alert and normal affect.  
 
DIFFERENTIAL DIAGNOSES/ MEDICAL DECISION MAKING:  
Dehydration, hyperglycemia-induced weakness, electrolyte and/or endocrine imbalance, CVA, intracranial hemorrhage, sepsis, cardiac arrhythmia, central versus peripheral vertigo, illicit drug intoxication, alcohol intoxication, prescribed drug toxicity, pregnancy in females patients, anxiety disorder, versus other etiologies or a combination of the above. ED COURSE:   
 
Diagnostic Study Results Abnormal lab results from this emergency department encounter: 
Labs Reviewed CBC WITH AUTOMATED DIFF - Abnormal; Notable for the following:   
    Result Value RBC 3.51 (*) HGB 9.9 (*) HCT 30.0 (*) All other components within normal limits METABOLIC PANEL, BASIC - Abnormal; Notable for the following: Potassium 3.3 (*) BUN 25 (*)   
 BUN/Creatinine ratio 26 (*)   
 GFR est non-AA 53 (*) All other components within normal limits MAGNESIUM - Abnormal; Notable for the following:   
 Magnesium 0.8 (*) All other components within normal limits CARDIAC PANEL,(CK, CKMB & TROPONIN) Lab values for this patient within approximately the last 12 hours: 
Recent Results (from the past 12 hour(s)) CBC WITH AUTOMATED DIFF Collection Time: 09/20/18  3:00 PM  
Result Value Ref Range WBC 7.6 4.6 - 13.2 K/uL  
 RBC 3.51 (L) 4.20 - 5.30 M/uL HGB 9.9 (L) 12.0 - 16.0 g/dL HCT 30.0 (L) 35.0 - 45.0 % MCV 85.5 74.0 - 97.0 FL  
 MCH 28.2 24.0 - 34.0 PG  
 MCHC 33.0 31.0 - 37.0 g/dL  
 RDW 14.4 11.6 - 14.5 % PLATELET 319 080 - 190 K/uL MPV 9.6 9.2 - 11.8 FL  
 NEUTROPHILS 61 40 - 73 % LYMPHOCYTES 27 21 - 52 % MONOCYTES 10 3 - 10 % EOSINOPHILS 2 0 - 5 % BASOPHILS 0 0 - 2 %  
 ABS. NEUTROPHILS 4.6 1.8 - 8.0 K/UL  
 ABS. LYMPHOCYTES 2.1 0.9 - 3.6 K/UL  
 ABS. MONOCYTES 0.8 0.05 - 1.2 K/UL  
 ABS. EOSINOPHILS 0.1 0.0 - 0.4 K/UL  
 ABS. BASOPHILS 0.0 0.0 - 0.1 K/UL  
 DF AUTOMATED METABOLIC PANEL, BASIC Collection Time: 09/20/18  3:00 PM  
Result Value Ref Range Sodium 142 136 - 145 mmol/L Potassium 3.3 (L) 3.5 - 5.5 mmol/L Chloride 103 100 - 108 mmol/L  
 CO2 31 21 - 32 mmol/L Anion gap 8 3.0 - 18 mmol/L Glucose 90 74 - 99 mg/dL BUN 25 (H) 7.0 - 18 MG/DL  Creatinine 0.98 0.6 - 1.3 MG/DL  
 BUN/Creatinine ratio 26 (H) 12 - 20 GFR est AA >60 >60 ml/min/1.73m2 GFR est non-AA 53 (L) >60 ml/min/1.73m2 Calcium 9.1 8.5 - 10.1 MG/DL MAGNESIUM Collection Time: 09/20/18  3:00 PM  
Result Value Ref Range Magnesium 0.8 (LL) 1.6 - 2.6 mg/dL CARDIAC PANEL,(CK, CKMB & TROPONIN) Collection Time: 09/20/18  3:00 PM  
Result Value Ref Range CK 47 26 - 192 U/L  
 CK - MB 1.1 <3.6 ng/ml CK-MB Index 2.3 0.0 - 4.0 % Troponin-I, Qt. 0.02 0.0 - 0.045 NG/ML  
EKG, 12 LEAD, INITIAL Collection Time: 09/20/18  3:33 PM  
Result Value Ref Range Ventricular Rate 85 BPM  
 Atrial Rate 85 BPM  
 P-R Interval 224 ms QRS Duration 92 ms Q-T Interval 376 ms QTC Calculation (Bezet) 447 ms Calculated P Axis 62 degrees Calculated T Axis 8 degrees Diagnosis Sinus rhythm with 1st degree AV block Minimal voltage criteria for LVH, may be normal variant Borderline ECG When compared with ECG of 31-JUL-2018 20:02, 
T wave inversion now evident in Inferior leads Radiologist and cardiologist interpretations if available at time of this note: No results found. Medication(s) ordered for patient during this emergency visit encounter: 
Medications  
magnesium sulfate 2 g/50 ml IVPB (premix or compounded) (not administered) potassium chloride (K-DUR, KLOR-CON) SR tablet 40 mEq (not administered)  
sodium chloride 0.9 % bolus infusion 1,000 mL (1,000 mL IntraVENous New Bag 9/20/18 1546) Medical Decision Making I am the first provider for this patient. I reviewed the vital signs, available nursing notes, past medical history, past surgical history, family history and social history. Vital Signs:  Reviewed the patient's vital signs. Initial EKG interpretation by attending emergency physician:   
Time: 15:33 Rate: 85 bpm 
Rhythm: Sinus rhythm Interpretation: No STEMI While unlikely, will assess the posterior fossa for hemorrhage with CT scan and will order routine labs seeking occult infection, metabolic derangement or evidence of anemia, acute worsening renal impairment, ACS/AMI (doubt), etc. My initial bedside impression is that this workup will likely be unremarkable and that the patient will ultimately be able to be discharged home, with instructions for further outpatient follow up and reevaluation by the patient's physician team. 
 
IMPRESSION AND MEDICAL DECISION MAKING: 
Based upon the patient's presentation with noted HPI and PE, along with the work up done in the emergency department, I believe that the patient is having dizziness of uncertain etiology. I am comfortable with discharge of the patient home with the noted prescribed medications, and outpatient follow up with the patients primary care physician. DIAGNOSIS: 
1. Dizziness SPECIFIC PATIENT INSTRUCTIONS FROM THE PHYSICIAN WHO TREATED YOU IN THE ER TODAY: 
1. Return if any concerns or worsening of condition(s). 2. FOLLOW UP APPOINTMENT:  Your primary doctor in 1-2 days. Patient is improved, resting quietly and comfortably. The patient will be discharged home. The patient was reassured that these symptoms do not appear to represent a serious or life threatening condition at this time. Warning signs of worsening condition were discussed and understood by the patient. Based on patient's age, coexisting illness, exam, and the results of this ED evaluation, the decision to treat as an outpatient was made. Based on the information available at time of discharge, acute pathology requiring immediate intervention was deemed relative unlikely. While it is impossible to completely exclude the possibility of underlying serious disease or worsening of condition, I feel the relative likelihood is extremely low. I discussed this uncertainty with the patient, who understood ED evaluation and treatment and felt comfortable with the outpatient treatment plan. All questions regarding care, test results, and follow up were answered. The patient is stable and appropriate to discharge. They understand that they should return to the emergency department for any new or worsening symptoms. I stressed the importance of follow up for repeat assessment and possibly further evaluation/treatment. Coding Diagnoses Clinical Impression: 1. Dizziness 2. Hypomagnesemia 3. Hypokalemia Disposition Disposition:  Home. WILNER Chan Board Certified Emergency Physician Provider Attestation: If a scribe was utilized in generation of this patient record, I personally performed the services described in the documentation, reviewed the documentation, as recorded by the scribe in my presence, and it accurately records the patient's history of presenting illness, review of systems, patient physical examination, and procedures performed by me as the attending physician. WILNER Chan Board Certified Emergency Physician 
9/20/2018. 
3:16 PM 
 
Scribe Attestation Brandenritika Sheikhgo acting as a scribe for and in the presence of Tierra Pena MD     
September 20, 2018 at 3:19 PM 
    
Provider Attestation:     
I personally performed the services described in the documentation, reviewed the documentation, as recorded by the scribe in my presence, and it accurately and completely records my words and actions.  September 20, 2018 at 3:19 PM - Tierra Pena MD

## 2018-09-20 NOTE — DISCHARGE INSTRUCTIONS
SPECIFIC PATIENT INSTRUCTIONS FROM THE PHYSICIAN WHO TREATED YOU IN THE ER TODAY:  1. Return if any concerns or worsening of condition(s). 2. FOLLOW UP APPOINTMENT:  Your primary doctor in 1-2 days. Dizziness: Care Instructions  Your Care Instructions  Dizziness is the feeling of unsteadiness or fuzziness in your head. It is different than having vertigo, which is a feeling that the room is spinning or that you are moving or falling. It is also different from lightheadedness, which is the feeling that you are about to faint. It can be hard to know what causes dizziness. Some people feel dizzy when they have migraine headaches. Sometimes bouts of flu can make you feel dizzy. Some medical conditions, such as heart problems or high blood pressure, can make you feel dizzy. Many medicines can cause dizziness, including medicines for high blood pressure, pain, or anxiety. If a medicine causes your symptoms, your doctor may recommend that you stop or change the medicine. If it is a problem with your heart, you may need medicine to help your heart work better. If there is no clear reason for your symptoms, your doctor may suggest watching and waiting for a while to see if the dizziness goes away on its own. Follow-up care is a key part of your treatment and safety. Be sure to make and go to all appointments, and call your doctor if you are having problems. It's also a good idea to know your test results and keep a list of the medicines you take. How can you care for yourself at home? · If your doctor recommends or prescribes medicine, take it exactly as directed. Call your doctor if you think you are having a problem with your medicine. · Do not drive while you feel dizzy. · Try to prevent falls. Steps you can take include:  ¨ Using nonskid mats, adding grab bars near the tub, and using night-lights. ¨ Clearing your home so that walkways are free of anything you might trip on.   ¨ Letting family and friends know that you have been feeling dizzy. This will help them know how to help you. When should you call for help? Call 911 anytime you think you may need emergency care. For example, call if:    · You passed out (lost consciousness).     · You have dizziness along with symptoms of a heart attack. These may include:  ¨ Chest pain or pressure, or a strange feeling in the chest.  ¨ Sweating. ¨ Shortness of breath. ¨ Nausea or vomiting. ¨ Pain, pressure, or a strange feeling in the back, neck, jaw, or upper belly or in one or both shoulders or arms. ¨ Lightheadedness or sudden weakness. ¨ A fast or irregular heartbeat.     · You have symptoms of a stroke. These may include:  ¨ Sudden numbness, tingling, weakness, or loss of movement in your face, arm, or leg, especially on only one side of your body. ¨ Sudden vision changes. ¨ Sudden trouble speaking. ¨ Sudden confusion or trouble understanding simple statements. ¨ Sudden problems with walking or balance. ¨ A sudden, severe headache that is different from past headaches.    Call your doctor now or seek immediate medical care if:    · You feel dizzy and have a fever, headache, or ringing in your ears.     · You have new or increased nausea and vomiting.     · Your dizziness does not go away or comes back.    Watch closely for changes in your health, and be sure to contact your doctor if:    · You do not get better as expected. Where can you learn more? Go to http://abena-tameka.info/. Enter G319 in the search box to learn more about \"Dizziness: Care Instructions. \"  Current as of: November 20, 2017  Content Version: 11.7  © 6591-5523 [employer]. Care instructions adapted under license by [employer] (which disclaims liability or warranty for this information).  If you have questions about a medical condition or this instruction, always ask your healthcare professional. Itzel Lawrence any warranty or liability for your use of this information. Hypokalemia: Care Instructions  Your Care Instructions    Hypokalemia (say \"uk-bo-bsb-SHEA-edin-uh\") is a low level of potassium. The heart, muscles, kidneys, and nervous system all need potassium to work well. This problem has many different causes. Kidney problems, diet, and medicines like diuretics and laxatives can cause it. So can vomiting or diarrhea. In some cases, cancer is the cause. Your doctor may do tests to find the cause of your low potassium levels. You may need medicines to bring your potassium levels back to normal. You may also need regular blood tests to check your potassium. If you have very low potassium, you may need intravenous (IV) medicines. You also may need tests to check the electrical activity of your heart. Heart problems caused by low potassium levels can be very serious. Follow-up care is a key part of your treatment and safety. Be sure to make and go to all appointments, and call your doctor if you are having problems. It's also a good idea to know your test results and keep a list of the medicines you take. How can you care for yourself at home? · If your doctor recommends it, eat foods that have a lot of potassium. These include fresh fruits, juices, and vegetables. They also include nuts, beans, and milk. · Be safe with medicines. If your doctor prescribes medicines or potassium supplements, take them exactly as directed. Call your doctor if you have any problems with your medicines. · Get your potassium levels tested as often as your doctor tells you. When should you call for help? Call 911 anytime you think you may need emergency care. For example, call if:    · You feel like your heart is missing beats.  Heart problems caused by low potassium can cause death.     · You passed out (lost consciousness).     · You have a seizure.    Call your doctor now or seek immediate medical care if:    · You feel weak or unusually tired.     · You have severe arm or leg cramps.     · You have tingling or numbness.     · You feel sick to your stomach, or you vomit.     · You have belly cramps.     · You feel bloated or constipated.     · You have to urinate a lot.     · You feel very thirsty most of the time.     · You are dizzy or lightheaded, or you feel like you may faint.     · You feel depressed, or you lose touch with reality.    Watch closely for changes in your health, and be sure to contact your doctor if:    · You do not get better as expected. Where can you learn more? Go to http://abena-tameka.info/. Enter G358 in the search box to learn more about \"Hypokalemia: Care Instructions. \"  Current as of: May 12, 2017  Content Version: 11.7  © 5445-5434 [facility]. Care instructions adapted under license by "[employer] (which disclaims liability or warranty for this information). If you have questions about a medical condition or this instruction, always ask your healthcare professional. [address] any warranty or liability for your use of this information. [employer] Activation    Thank you for requesting access to [employer]. Please follow the instructions below to securely access and download your online medical record. [employer] allows you to send messages to your doctor, view your test results, renew your prescriptions, schedule appointments, and more. How Do I Sign Up? 1. In your internet browser, go to [email or web address]. 2. Click on the First Time User? Click Here link in the Sign In box. You will see the New Member Sign Up page. 3. Enter your [employer] Access Code exactly as it appears below. You will not need to use this code after youve completed the sign-up process. If you do not sign up before the expiration date, you must request a new code.     [employer] Access Code: [email or web address]  Expires: 10/29/2018  7:53 PM (This is the date your ioSafe access code will )    4. Enter the last four digits of your Social Security Number (xxxx) and Date of Birth (mm/dd/yyyy) as indicated and click Submit. You will be taken to the next sign-up page. 5. Create a DoubleRecallt ID. This will be your ioSafe login ID and cannot be changed, so think of one that is secure and easy to remember. 6. Create a ioSafe password. You can change your password at any time. 7. Enter your Password Reset Question and Answer. This can be used at a later time if you forget your password. 8. Enter your e-mail address. You will receive e-mail notification when new information is available in [address] 9. Click Sign Up. You can now view and download portions of your medical record. 10. Click the Download Summary menu link to download a portable copy of your medical information. Additional Information    If you have questions, please visit the Frequently Asked Questions section of the ioSafe website at [email or web address] Remember, ioSafe is NOT to be used for urgent needs. For medical emergencies, dial 911.

## 2018-09-20 NOTE — ED NOTES
Dr Wang Patel aware of documented BP's . Dr Wang Patel reports discharge pt/inform pt to take BP meds once arrive home. Informed pt to take BP medications once arrive home. Pt verbalized understanding

## 2018-09-21 ENCOUNTER — HOME CARE VISIT (OUTPATIENT)
Dept: SCHEDULING | Facility: HOME HEALTH | Age: 83
End: 2018-09-21
Payer: MEDICARE

## 2018-09-21 VITALS
DIASTOLIC BLOOD PRESSURE: 54 MMHG | SYSTOLIC BLOOD PRESSURE: 116 MMHG | TEMPERATURE: 98.8 F | HEART RATE: 92 BPM | OXYGEN SATURATION: 99 % | RESPIRATION RATE: 24 BRPM

## 2018-09-21 PROCEDURE — 3331090002 HH PPS REVENUE DEBIT

## 2018-09-21 PROCEDURE — G0157 HHC PT ASSISTANT EA 15: HCPCS

## 2018-09-21 PROCEDURE — 3331090001 HH PPS REVENUE CREDIT

## 2018-09-22 LAB
ATRIAL RATE: 85 BPM
CALCULATED P AXIS, ECG09: 62 DEGREES
CALCULATED T AXIS, ECG11: 8 DEGREES
DIAGNOSIS, 93000: NORMAL
P-R INTERVAL, ECG05: 224 MS
Q-T INTERVAL, ECG07: 376 MS
QRS DURATION, ECG06: 92 MS
QTC CALCULATION (BEZET), ECG08: 447 MS
VENTRICULAR RATE, ECG03: 85 BPM

## 2018-09-22 PROCEDURE — 3331090001 HH PPS REVENUE CREDIT

## 2018-09-22 PROCEDURE — 3331090002 HH PPS REVENUE DEBIT

## 2018-09-23 PROCEDURE — 3331090001 HH PPS REVENUE CREDIT

## 2018-09-23 PROCEDURE — 3331090002 HH PPS REVENUE DEBIT

## 2018-09-24 PROCEDURE — 3331090002 HH PPS REVENUE DEBIT

## 2018-09-24 PROCEDURE — 3331090001 HH PPS REVENUE CREDIT

## 2018-09-25 ENCOUNTER — HOME CARE VISIT (OUTPATIENT)
Dept: HOME HEALTH SERVICES | Facility: HOME HEALTH | Age: 83
End: 2018-09-25
Payer: MEDICARE

## 2018-09-25 PROCEDURE — 3331090002 HH PPS REVENUE DEBIT

## 2018-09-25 PROCEDURE — 3331090001 HH PPS REVENUE CREDIT

## 2018-09-26 PROCEDURE — 3331090002 HH PPS REVENUE DEBIT

## 2018-09-26 PROCEDURE — 3331090001 HH PPS REVENUE CREDIT

## 2018-09-27 ENCOUNTER — HOME CARE VISIT (OUTPATIENT)
Dept: SCHEDULING | Facility: HOME HEALTH | Age: 83
End: 2018-09-27
Payer: MEDICARE

## 2018-09-27 VITALS
RESPIRATION RATE: 16 BRPM | SYSTOLIC BLOOD PRESSURE: 130 MMHG | DIASTOLIC BLOOD PRESSURE: 70 MMHG | TEMPERATURE: 97.9 F | HEART RATE: 102 BPM | OXYGEN SATURATION: 98 %

## 2018-09-27 PROCEDURE — 3331090001 HH PPS REVENUE CREDIT

## 2018-09-27 PROCEDURE — G0162 HHC RN E&M PLAN SVS, 15 MIN: HCPCS

## 2018-09-27 PROCEDURE — 3331090002 HH PPS REVENUE DEBIT

## 2018-09-28 PROCEDURE — 3331090002 HH PPS REVENUE DEBIT

## 2018-09-28 PROCEDURE — 3331090001 HH PPS REVENUE CREDIT

## 2018-09-29 PROCEDURE — 3331090002 HH PPS REVENUE DEBIT

## 2018-09-29 PROCEDURE — 3331090001 HH PPS REVENUE CREDIT

## 2018-09-30 PROCEDURE — 3331090001 HH PPS REVENUE CREDIT

## 2018-09-30 PROCEDURE — 3331090002 HH PPS REVENUE DEBIT

## 2018-10-01 PROCEDURE — 3331090002 HH PPS REVENUE DEBIT

## 2018-10-01 PROCEDURE — 3331090001 HH PPS REVENUE CREDIT

## 2018-12-15 ENCOUNTER — APPOINTMENT (OUTPATIENT)
Dept: GENERAL RADIOLOGY | Age: 83
End: 2018-12-15
Attending: PHYSICIAN ASSISTANT
Payer: MEDICARE

## 2018-12-15 ENCOUNTER — HOSPITAL ENCOUNTER (EMERGENCY)
Age: 83
Discharge: HOME | End: 2018-12-15
Attending: EMERGENCY MEDICINE | Admitting: EMERGENCY MEDICINE
Payer: MEDICARE

## 2018-12-15 ENCOUNTER — APPOINTMENT (OUTPATIENT)
Dept: CT IMAGING | Age: 83
End: 2018-12-15
Attending: PHYSICIAN ASSISTANT
Payer: MEDICARE

## 2018-12-15 VITALS
WEIGHT: 110 LBS | SYSTOLIC BLOOD PRESSURE: 137 MMHG | RESPIRATION RATE: 18 BRPM | HEART RATE: 100 BPM | TEMPERATURE: 97.3 F | BODY MASS INDEX: 18.78 KG/M2 | OXYGEN SATURATION: 100 % | HEIGHT: 64 IN | DIASTOLIC BLOOD PRESSURE: 104 MMHG
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
## 2018-12-15 DIAGNOSIS — S89.92XA INJURY OF LEFT KNEE, INITIAL ENCOUNTER: ICD-10-CM

## 2018-12-15 LAB
ANION GAP SERPL CALC-SCNC: 8 MMOL/L (ref 3–18)
BASOPHILS # BLD: 0 K/UL (ref 0–0.1)
BASOPHILS NFR BLD: 0 % (ref 0–2)
BUN SERPL-MCNC: 34 MG/DL (ref 7–18)
BUN/CREAT SERPL: 28 (ref 12–20)
CALCIUM SERPL-MCNC: 10.1 MG/DL (ref 8.5–10.1)
CHLORIDE SERPL-SCNC: 103 MMOL/L (ref 100–108)
CK MB CFR SERPL CALC: 2.1 % (ref 0–4)
CK MB SERPL-MCNC: 4.3 NG/ML (ref 5–25)
CK SERPL-CCNC: 204 U/L (ref 26–192)
CO2 SERPL-SCNC: 29 MMOL/L (ref 21–32)
CREAT SERPL-MCNC: 1.21 MG/DL (ref 0.6–1.3)
DIFFERENTIAL METHOD BLD: ABNORMAL
EOSINOPHIL # BLD: 0 K/UL (ref 0–0.4)
EOSINOPHIL NFR BLD: 0 % (ref 0–5)
ERYTHROCYTE [DISTWIDTH] IN BLOOD BY AUTOMATED COUNT: 14.1 % (ref 11.6–14.5)
GLUCOSE SERPL-MCNC: 129 MG/DL (ref 74–99)
HCT VFR BLD AUTO: 33.3 % (ref 35–45)
HGB BLD-MCNC: 10.7 G/DL (ref 12–16)
LYMPHOCYTES # BLD: 0.7 K/UL (ref 0.9–3.6)
LYMPHOCYTES NFR BLD: 7 % (ref 21–52)
MCH RBC QN AUTO: 28.2 PG (ref 24–34)
MCHC RBC AUTO-ENTMCNC: 32.1 G/DL (ref 31–37)
MCV RBC AUTO: 87.6 FL (ref 74–97)
MONOCYTES # BLD: 1.2 K/UL (ref 0.05–1.2)
MONOCYTES NFR BLD: 11 % (ref 3–10)
NEUTS SEG # BLD: 9.3 K/UL (ref 1.8–8)
NEUTS SEG NFR BLD: 82 % (ref 40–73)
PLATELET # BLD AUTO: 229 K/UL (ref 135–420)
PMV BLD AUTO: 10.7 FL (ref 9.2–11.8)
POTASSIUM SERPL-SCNC: 3.7 MMOL/L (ref 3.5–5.5)
RBC # BLD AUTO: 3.8 M/UL (ref 4.2–5.3)
SODIUM SERPL-SCNC: 140 MMOL/L (ref 136–145)
TROPONIN I SERPL-MCNC: 0.02 NG/ML (ref 0–0.04)
WBC # BLD AUTO: 11.3 K/UL (ref 4.6–13.2)

## 2018-12-15 PROCEDURE — 73564 X-RAY EXAM KNEE 4 OR MORE: CPT

## 2018-12-15 PROCEDURE — 82553 CREATINE MB FRACTION: CPT

## 2018-12-15 PROCEDURE — 99284 EMERGENCY DEPT VISIT MOD MDM: CPT

## 2018-12-15 PROCEDURE — 80048 BASIC METABOLIC PNL TOTAL CA: CPT

## 2018-12-15 PROCEDURE — 85025 COMPLETE CBC W/AUTO DIFF WBC: CPT

## 2018-12-15 PROCEDURE — 74011250637 HC RX REV CODE- 250/637: Performed by: PHYSICIAN ASSISTANT

## 2018-12-15 PROCEDURE — 70450 CT HEAD/BRAIN W/O DYE: CPT

## 2018-12-15 PROCEDURE — 73502 X-RAY EXAM HIP UNI 2-3 VIEWS: CPT

## 2018-12-15 PROCEDURE — 93005 ELECTROCARDIOGRAM TRACING: CPT

## 2018-12-15 RX ORDER — ACETAMINOPHEN 500 MG
500 TABLET ORAL
Status: COMPLETED | OUTPATIENT
Start: 2018-12-15 | End: 2018-12-15

## 2018-12-15 RX ADMIN — ACETAMINOPHEN 500 MG: 500 TABLET, FILM COATED ORAL at 21:48

## 2018-12-16 LAB
ATRIAL RATE: 101 BPM
CALCULATED P AXIS, ECG09: 86 DEGREES
CALCULATED R AXIS, ECG10: 37 DEGREES
CALCULATED T AXIS, ECG11: 33 DEGREES
DIAGNOSIS, 93000: NORMAL
P-R INTERVAL, ECG05: 206 MS
Q-T INTERVAL, ECG07: 338 MS
QRS DURATION, ECG06: 82 MS
QTC CALCULATION (BEZET), ECG08: 438 MS
VENTRICULAR RATE, ECG03: 101 BPM

## 2018-12-16 NOTE — ED TRIAGE NOTES
Arrives by EMS for fall from a standing position. \"I think my head hit the pereira. My left knee always hurts. I have really bad arthritis but my left hip is hurting too. \"

## 2018-12-16 NOTE — ED NOTES
I have reviewed discharge instructions with the patient and caregiver. The patient and caregiver verbalized understanding. Patient in stable condition at discharge. Patient armband removed and given to patient to take home. Patient was informed of the privacy risks if armband lost or stolen. Patient signed paper discharge instructions.
fair minus

## 2018-12-16 NOTE — DISCHARGE INSTRUCTIONS

## 2018-12-16 NOTE — ED PROVIDER NOTES
EMERGENCY DEPARTMENT HISTORY AND PHYSICAL EXAM    Date: 12/15/2018  Patient Name: Dyana Zavala    History of Presenting Illness     Chief Complaint   Patient presents with    Fall         History Provided By: Patient and EMS    Chief Complaint: fall  Duration: 30 Minutes  Timing:  Acute  Location: left knee  Quality: Aching  Severity: Mild  Modifying Factors: direct pressure, weight makes pain worse  Associated Symptoms: left leg pain      HPI: Dyana Zavala is a 80 y.o. female with a PMH of HTN, arthritis, CAD, migraine, polymyalgia rheumatica who presents to the ER via EMS s/p mechanical fall. Patient states she was walking in her kitchen, when she slipped on the wet floor and fell forward. Patient may have hit her head on the cabinet as she went down. She denied any LOC and does not take any blood thinners. Patient is complaining of pain and swelling to her left knee. She is unable to weight bear right now. She is also having left leg pain. She has no other symptoms or complaints. PCP: Philbert Homans, MD    Current Outpatient Medications   Medication Sig Dispense Refill    acetaminophen-codeine (TYLENOL #3) 300-30 mg per tablet Take 1 Tab by mouth every six (6) hours as needed for Pain. Max Daily Amount: 4 Tabs. 12 Tab 0    cloNIDine HCl (CATAPRES) 0.1 mg tablet Take 0.1 mg by mouth two (2) times a day.  metoprolol tartrate (LOPRESSOR) 100 mg IR tablet Take 100 mg by mouth daily.  ciprofloxacin HCl (CILOXAN) 0.3 % ophthalmic solution Administer 2 Drops to left eye three (3) times daily.  acetaminophen (TYLENOL PO) 300 mg by Buccal route two (2) times a day. Indications: pain      guaifenesin/dextromethorphan (GUAIFENESIN DM PO) Take 10 mL by mouth two (2) times daily as needed (cough). makes nauseated not taking      polyethylene glycol (MIRALAX) 17 gram packet Take 1 Packet by mouth nightly.  12 Packet 0    atenolol (TENORMIN) 50 mg tablet Take 1 Tab by mouth every twelve (12) hours. (Patient not taking: Reported on 2018) 60 Tab 0    acetaminophen (TYLENOL) 325 mg tablet Take 2 Tabs by mouth every six (6) hours as needed. 60 Tab 0    pantoprazole (PROTONIX) 40 mg tablet Take 1 Tab by mouth Daily (before breakfast). 30 Tab 0    predniSONE (DELTASONE) 10 mg tablet Take 0.5 Tabs by mouth daily. 14 Tab 1    albuterol-ipratropium (DUO-NEB) 2.5 mg-0.5 mg/3 ml nebulizer solution 3 mL by Nebulization route three (3) times daily as needed for Wheezing. 1 Package 1    losartan-hydrochlorothiazide (HYZAAR) 100-25 mg per tablet Take 1 Tab by mouth daily.  simvastatin (ZOCOR) 40 mg tablet Take  by mouth nightly.  aspirin 81 mg tablet Take 81 mg by mouth.  ergocalciferol (VITAMIN D2) 50,000 unit capsule Take 50,000 Units by mouth every seven (7) days. Past History     Past Medical History:  Past Medical History:   Diagnosis Date    Arthritis     CAD (coronary artery disease)     3 coronary stents    Cardiac complication     Hypertension     Migraine 3/1/2015    Polymyalgia rheumatica ([address] 2015       Past Surgical History:  Past Surgical History:   Procedure Laterality Date    CARDIAC SURG PROCEDURE UNLIST      3 stents       Family History:  Family History   Problem Relation Age of Onset    Heart Disease Mother     Heart Disease Father        Social History:  Social History     Tobacco Use    Smoking status: Former Smoker     Last attempt to quit: 1/15/1980     Years since quittin.9    Smokeless tobacco: Never Used   Substance Use Topics    Alcohol use: No    Drug use: Not on file       Allergies:  No Known Allergies      Review of Systems   Review of Systems   Constitutional: Negative for chills, fatigue and fever. HENT: Negative. Negative for sore throat. Eyes: Negative. Respiratory: Negative for cough and shortness of breath. Cardiovascular: Negative for chest pain and palpitations.    Gastrointestinal: Negative for abdominal pain, nausea and vomiting. Genitourinary: Negative for dysuria. Musculoskeletal: Positive for arthralgias and joint swelling. Left knee pain, swelling  Left leg pain     Skin: Negative. Neurological: Negative for dizziness, weakness, light-headedness and headaches. Psychiatric/Behavioral: Negative. All other systems reviewed and are negative. Physical Exam     Vitals:    12/15/18 2005   BP: 147/69   Pulse: 100   Resp: 18   Temp: 97.3 °F (36.3 °C)   SpO2: 98%   Weight: 49.9 kg (110 lb)   Height: 5' 4\" (1.626 m)     Physical Exam   Constitutional: She is oriented to person, place, and time. She appears well-developed and well-nourished. No distress. HENT:   Head: Normocephalic and atraumatic. Mouth/Throat: Oropharynx is clear and moist.   Eyes: Conjunctivae are normal. No scleral icterus. Neck: Neck supple. No JVD present. No spinous process tenderness present. No tracheal deviation present. Cardiovascular: Normal rate, regular rhythm and normal heart sounds. No murmur heard. Pulmonary/Chest: Effort normal and breath sounds normal. No respiratory distress. She has no wheezes. She has no rales. She exhibits no tenderness. Abdominal: Soft. Bowel sounds are normal. She exhibits no distension. There is no tenderness. There is no rebound and no guarding. Musculoskeletal:        Right knee: Normal.        Left knee: She exhibits decreased range of motion, swelling, abnormal patellar mobility and bony tenderness. Pt w/ LROM to left knee and leg due to pain at knee; moderate anterior swelling noted with pain to patellar manipulation on exam; strong dp and pt pulses BL. No pain to palpation to BL hips   Neurological: She is alert and oriented to person, place, and time. She has normal strength. Gait normal. GCS eye subscore is 4. GCS verbal subscore is 5. GCS motor subscore is 6. Skin: Skin is warm and dry. She is not diaphoretic.    Psychiatric: She has a normal mood and affect. Nursing note and vitals reviewed. Diagnostic Study Results     Labs -     Recent Results (from the past 12 hour(s))   EKG, 12 LEAD, INITIAL    Collection Time: 12/15/18  8:31 PM   Result Value Ref Range    Ventricular Rate 101 BPM    Atrial Rate 101 BPM    P-R Interval 206 ms    QRS Duration 82 ms    Q-T Interval 338 ms    QTC Calculation (Bezet) 438 ms    Calculated P Axis 86 degrees    Calculated R Axis 37 degrees    Calculated T Axis 33 degrees    Diagnosis       Sinus tachycardia  Otherwise normal ECG  When compared with ECG of 20-SEP-2018 15:33,  No significant change was found     CBC WITH AUTOMATED DIFF    Collection Time: 12/15/18  8:46 PM   Result Value Ref Range    WBC 11.3 4.6 - 13.2 K/uL    RBC 3.80 (L) 4.20 - 5.30 M/uL    HGB 10.7 (L) 12.0 - 16.0 g/dL    HCT 33.3 (L) 35.0 - 45.0 %    MCV 87.6 74.0 - 97.0 FL    MCH 28.2 24.0 - 34.0 PG    MCHC 32.1 31.0 - 37.0 g/dL    RDW 14.1 11.6 - 14.5 %    PLATELET 121 712 - 003 K/uL    MPV 10.7 9.2 - 11.8 FL    NEUTROPHILS 82 (H) 40 - 73 %    LYMPHOCYTES 7 (L) 21 - 52 %    MONOCYTES 11 (H) 3 - 10 %    EOSINOPHILS 0 0 - 5 %    BASOPHILS 0 0 - 2 %    ABS. NEUTROPHILS 9.3 (H) 1.8 - 8.0 K/UL    ABS. LYMPHOCYTES 0.7 (L) 0.9 - 3.6 K/UL    ABS. MONOCYTES 1.2 0.05 - 1.2 K/UL    ABS. EOSINOPHILS 0.0 0.0 - 0.4 K/UL    ABS.  BASOPHILS 0.0 0.0 - 0.1 K/UL    DF AUTOMATED     METABOLIC PANEL, BASIC    Collection Time: 12/15/18  8:46 PM   Result Value Ref Range    Sodium 140 136 - 145 mmol/L    Potassium 3.7 3.5 - 5.5 mmol/L    Chloride 103 100 - 108 mmol/L    CO2 29 21 - 32 mmol/L    Anion gap 8 3.0 - 18 mmol/L    Glucose 129 (H) 74 - 99 mg/dL    BUN 34 (H) 7.0 - 18 MG/DL    Creatinine 1.21 0.6 - 1.3 MG/DL    BUN/Creatinine ratio 28 (H) 12 - 20      GFR est AA 50 (L) >60 ml/min/1.73m2    GFR est non-AA 42 (L) >60 ml/min/1.73m2    Calcium 10.1 8.5 - 10.1 MG/DL   CARDIAC PANEL,(CK, CKMB & TROPONIN)    Collection Time: 12/15/18  8:46 PM   Result Value Ref Range  (H) 26 - 192 U/L    CK - MB 4.3 (H) <3.6 ng/ml    CK-MB Index 2.1 0.0 - 4.0 %    Troponin-I, Qt. 0.02 0.0 - 0.045 NG/ML       Radiologic Studies -   CT HEAD WO CONT    (Results Pending)   XR KNEE LT MIN 4 V    (Results Pending)   XR HIP LT W OR WO PELV 2-3 VWS    (Results Pending)     CT Results  (Last 48 hours)    None        CXR Results  (Last 48 hours)    None            Medical Decision Making   I am the first provider for this patient. I reviewed the vital signs, available nursing notes, past medical history, past surgical history, family history and social history. Vital Signs-Reviewed the patient's vital signs. Records Reviewed: Nursing Notes, Old Medical Records, Previous Radiology Studies and Previous Laboratory Studies     8:28 PM  81 y/o female brought in by EMS s/p mechanical fall. States she was walking in her kitchen and slipped on wet floor. Was not using her walker like she should. Unsure if she hit her head on the kitchen cabinet when she went down; pain to left knee with swelling. No LOC and not on any blood thinners. Will plan on labs, ekg, imaging at this time. Romero Cotton PA-C     10:27 PM  Labs reviewed and all results discussed with pt and family member at bedside. Ct head negative. No acute process noted on hip and knee xray. Discussed imaging with dr. Claudia Rouse. Advised pt to alternate heat and ice as needed for pain and swelling of knee. Given return precautions. Family member at bedside will be able to tx home. Advised to f/u with PCP as needed. All questions answered and patient in agreement with plan of care. Will plan for discharge. Romero Cotton PA-C    Disposition:  Discharged    DISCHARGE NOTE:       Care plan outlined and precautions discussed. Patient has no new complaints, changes, or physical findings. Results of labs, imaging were reviewed with the patient. All medications were reviewed with the patient; will d/c home with n/a.  All of pt's questions and concerns were addressed. Patient was instructed and agrees to follow up with pcp, as well as to return to the ED upon further deterioration. Patient is ready to go home. Follow-up Information     Follow up With Specialties Details Why [facility] EMERGENCY DEPT Emergency Medicine  If symptoms worsen [address]    Carine Ojeda MD Geriatric Medicine Call in 3 days As needed for ER follow up for fall [id number] [facility]   [facility] Internal Medicine PD  [address] [facility] [address]  [phone]            Current Discharge Medication List      CONTINUE these medications which have NOT CHANGED    Details   acetaminophen-codeine (TYLENOL #3) 300-30 mg per tablet Take 1 Tab by mouth every six (6) hours as needed for Pain. Max Daily Amount: 4 Tabs. Qty: 12 Tab, Refills: 0    Associated Diagnoses: Foot pain, right      cloNIDine HCl (CATAPRES) 0.1 mg tablet Take 0.1 mg by mouth two (2) times a day. metoprolol tartrate (LOPRESSOR) 100 mg IR tablet Take 100 mg by mouth daily. ciprofloxacin HCl (CILOXAN) 0.3 % ophthalmic solution Administer 2 Drops to left eye three (3) times daily. !! acetaminophen (TYLENOL PO) 300 mg by Buccal route two (2) times a day. Indications: pain      guaifenesin/dextromethorphan (GUAIFENESIN DM PO) Take 10 mL by mouth two (2) times daily as needed (cough). makes nauseated not taking      polyethylene glycol (MIRALAX) 17 gram packet Take 1 Packet by mouth nightly. Qty: 12 Packet, Refills: 0      atenolol (TENORMIN) 50 mg tablet Take 1 Tab by mouth every twelve (12) hours. Qty: 60 Tab, Refills: 0      !! acetaminophen (TYLENOL) 325 mg tablet Take 2 Tabs by mouth every six (6) hours as needed. Qty: 60 Tab, Refills: 0      pantoprazole (PROTONIX) 40 mg tablet Take 1 Tab by mouth Daily (before breakfast). Qty: 30 Tab, Refills: 0      predniSONE (DELTASONE) 10 mg tablet Take 0.5 Tabs by mouth daily.   Qty: 14 Tab, Refills: 1    Comments: 10 mg BID X 3days,then 10 mg daily X 3 days  Then 5 mg daily till further orders. albuterol-ipratropium (DUO-NEB) 2.5 mg-0.5 mg/3 ml nebulizer solution 3 mL by Nebulization route three (3) times daily as needed for Wheezing. Qty: 1 Package, Refills: 1      losartan-hydrochlorothiazide (HYZAAR) 100-25 mg per tablet Take 1 Tab by mouth daily. simvastatin (ZOCOR) 40 mg tablet Take  by mouth nightly. aspirin 81 mg tablet Take 81 mg by mouth.        ergocalciferol (VITAMIN D2) 50,000 unit capsule Take 50,000 Units by mouth every seven (7) days. !! - Potential duplicate medications found. Please discuss with provider. Provider Notes (Medical Decision Making):     Procedures:  Procedures        Diagnosis     Clinical Impression:   1. Fall, initial encounter    2.  Injury of left knee, initial encounter

## 2019-01-27 ENCOUNTER — HOSPITAL ENCOUNTER (INPATIENT)
Age: 84
LOS: 3 days | Discharge: NURSING, REHAB OR LONG-TERM CARE | DRG: 553 | End: 2019-01-30
Attending: EMERGENCY MEDICINE | Admitting: INTERNAL MEDICINE
Payer: MEDICARE

## 2019-01-27 ENCOUNTER — APPOINTMENT (OUTPATIENT)
Dept: GENERAL RADIOLOGY | Age: 84
DRG: 553 | End: 2019-01-27
Attending: EMERGENCY MEDICINE
Payer: MEDICARE

## 2019-01-27 ENCOUNTER — APPOINTMENT (OUTPATIENT)
Dept: CT IMAGING | Age: 84
DRG: 553 | End: 2019-01-27
Attending: EMERGENCY MEDICINE
Payer: MEDICARE

## 2019-01-27 DIAGNOSIS — L03.113 CELLULITIS OF RIGHT HAND: Primary | ICD-10-CM

## 2019-01-27 DIAGNOSIS — R41.0 DELIRIUM: ICD-10-CM

## 2019-01-27 LAB
ALBUMIN SERPL-MCNC: 2.6 G/DL (ref 3.4–5)
ALBUMIN/GLOB SERPL: 0.7 {RATIO} (ref 0.8–1.7)
ALP SERPL-CCNC: 114 U/L (ref 45–117)
ALT SERPL-CCNC: 23 U/L (ref 13–56)
AMORPH CRY URNS QL MICRO: ABNORMAL
ANION GAP SERPL CALC-SCNC: 8 MMOL/L (ref 3–18)
APPEARANCE UR: CLEAR
AST SERPL-CCNC: 40 U/L (ref 15–37)
BACTERIA URNS QL MICRO: NEGATIVE /HPF
BASOPHILS # BLD: 0 K/UL (ref 0–0.1)
BASOPHILS NFR BLD: 0 % (ref 0–2)
BILIRUB SERPL-MCNC: 0.6 MG/DL (ref 0.2–1)
BILIRUB UR QL: ABNORMAL
BUN SERPL-MCNC: 24 MG/DL (ref 7–18)
BUN/CREAT SERPL: 32 (ref 12–20)
CALCIUM SERPL-MCNC: 9.7 MG/DL (ref 8.5–10.1)
CHLORIDE SERPL-SCNC: 101 MMOL/L (ref 100–108)
CO2 SERPL-SCNC: 35 MMOL/L (ref 21–32)
COLOR UR: ABNORMAL
CREAT SERPL-MCNC: 0.76 MG/DL (ref 0.6–1.3)
DIFFERENTIAL METHOD BLD: ABNORMAL
EOSINOPHIL # BLD: 0.1 K/UL (ref 0–0.4)
EOSINOPHIL NFR BLD: 1 % (ref 0–5)
EPITH CASTS URNS QL MICRO: ABNORMAL /LPF (ref 0–5)
ERYTHROCYTE [DISTWIDTH] IN BLOOD BY AUTOMATED COUNT: 15 % (ref 11.6–14.5)
GLOBULIN SER CALC-MCNC: 3.8 G/DL (ref 2–4)
GLUCOSE SERPL-MCNC: 101 MG/DL (ref 74–99)
GLUCOSE UR STRIP.AUTO-MCNC: NEGATIVE MG/DL
HCT VFR BLD AUTO: 34.3 % (ref 35–45)
HGB BLD-MCNC: 10.8 G/DL (ref 12–16)
HGB UR QL STRIP: NEGATIVE
KETONES UR QL STRIP.AUTO: NEGATIVE MG/DL
LACTATE BLD-SCNC: 1.14 MMOL/L (ref 0.4–2)
LEUKOCYTE ESTERASE UR QL STRIP.AUTO: NEGATIVE
LYMPHOCYTES # BLD: 1.2 K/UL (ref 0.9–3.6)
LYMPHOCYTES NFR BLD: 13 % (ref 21–52)
MCH RBC QN AUTO: 27 PG (ref 24–34)
MCHC RBC AUTO-ENTMCNC: 31.5 G/DL (ref 31–37)
MCV RBC AUTO: 85.8 FL (ref 74–97)
MONOCYTES # BLD: 0.6 K/UL (ref 0.05–1.2)
MONOCYTES NFR BLD: 7 % (ref 3–10)
NEUTS SEG # BLD: 7 K/UL (ref 1.8–8)
NEUTS SEG NFR BLD: 79 % (ref 40–73)
NITRITE UR QL STRIP.AUTO: NEGATIVE
PH UR STRIP: 5.5 [PH] (ref 5–8)
PLATELET # BLD AUTO: 473 K/UL (ref 135–420)
PMV BLD AUTO: 10 FL (ref 9.2–11.8)
POTASSIUM SERPL-SCNC: 3.1 MMOL/L (ref 3.5–5.5)
PROT SERPL-MCNC: 6.4 G/DL (ref 6.4–8.2)
PROT UR STRIP-MCNC: 30 MG/DL
RBC # BLD AUTO: 4 M/UL (ref 4.2–5.3)
RBC #/AREA URNS HPF: ABNORMAL /HPF (ref 0–5)
SODIUM SERPL-SCNC: 144 MMOL/L (ref 136–145)
SP GR UR REFRACTOMETRY: 1.02 (ref 1–1.03)
UROBILINOGEN UR QL STRIP.AUTO: 1 EU/DL (ref 0.2–1)
WBC # BLD AUTO: 8.9 K/UL (ref 4.6–13.2)
WBC URNS QL MICRO: ABNORMAL /HPF (ref 0–4)

## 2019-01-27 PROCEDURE — 99285 EMERGENCY DEPT VISIT HI MDM: CPT

## 2019-01-27 PROCEDURE — 71045 X-RAY EXAM CHEST 1 VIEW: CPT

## 2019-01-27 PROCEDURE — 74011250636 HC RX REV CODE- 250/636: Performed by: EMERGENCY MEDICINE

## 2019-01-27 PROCEDURE — 85025 COMPLETE CBC W/AUTO DIFF WBC: CPT

## 2019-01-27 PROCEDURE — 77030038269 HC DRN EXT URIN PURWCK BARD -A

## 2019-01-27 PROCEDURE — 73080 X-RAY EXAM OF ELBOW: CPT

## 2019-01-27 PROCEDURE — 81001 URINALYSIS AUTO W/SCOPE: CPT

## 2019-01-27 PROCEDURE — 93005 ELECTROCARDIOGRAM TRACING: CPT

## 2019-01-27 PROCEDURE — 73110 X-RAY EXAM OF WRIST: CPT

## 2019-01-27 PROCEDURE — 51701 INSERT BLADDER CATHETER: CPT

## 2019-01-27 PROCEDURE — 74011000258 HC RX REV CODE- 258: Performed by: EMERGENCY MEDICINE

## 2019-01-27 PROCEDURE — 96365 THER/PROPH/DIAG IV INF INIT: CPT

## 2019-01-27 PROCEDURE — 96366 THER/PROPH/DIAG IV INF ADDON: CPT

## 2019-01-27 PROCEDURE — 83605 ASSAY OF LACTIC ACID: CPT

## 2019-01-27 PROCEDURE — 74011250636 HC RX REV CODE- 250/636: Performed by: INTERNAL MEDICINE

## 2019-01-27 PROCEDURE — 70450 CT HEAD/BRAIN W/O DYE: CPT

## 2019-01-27 PROCEDURE — 74011250637 HC RX REV CODE- 250/637: Performed by: EMERGENCY MEDICINE

## 2019-01-27 PROCEDURE — 74011250637 HC RX REV CODE- 250/637: Performed by: INTERNAL MEDICINE

## 2019-01-27 PROCEDURE — 87040 BLOOD CULTURE FOR BACTERIA: CPT

## 2019-01-27 PROCEDURE — 65270000029 HC RM PRIVATE

## 2019-01-27 PROCEDURE — 80053 COMPREHEN METABOLIC PANEL: CPT

## 2019-01-27 PROCEDURE — 77030005563 HC CATH URETH INT MMGH -A

## 2019-01-27 PROCEDURE — 73130 X-RAY EXAM OF HAND: CPT

## 2019-01-27 RX ORDER — METOPROLOL TARTRATE 50 MG/1
100 TABLET ORAL ONCE
Status: COMPLETED | OUTPATIENT
Start: 2019-01-27 | End: 2019-01-27

## 2019-01-27 RX ORDER — HEPARIN SODIUM 5000 [USP'U]/ML
5000 INJECTION, SOLUTION INTRAVENOUS; SUBCUTANEOUS EVERY 8 HOURS
Status: DISCONTINUED | OUTPATIENT
Start: 2019-01-27 | End: 2019-01-30 | Stop reason: HOSPADM

## 2019-01-27 RX ORDER — LORAZEPAM 0.5 MG/1
0.5 TABLET ORAL
Status: DISCONTINUED | OUTPATIENT
Start: 2019-01-27 | End: 2019-01-30 | Stop reason: HOSPADM

## 2019-01-27 RX ORDER — DEXTROSE AND POTASSIUM CHLORIDE 5; .15 G/100ML; G/100ML
SOLUTION INTRAVENOUS CONTINUOUS
Status: DISCONTINUED | OUTPATIENT
Start: 2019-01-27 | End: 2019-01-29

## 2019-01-27 RX ORDER — GUAIFENESIN 100 MG/5ML
81 LIQUID (ML) ORAL DAILY
Status: DISCONTINUED | OUTPATIENT
Start: 2019-01-28 | End: 2019-01-30 | Stop reason: HOSPADM

## 2019-01-27 RX ORDER — PREDNISONE 20 MG/1
20 TABLET ORAL
Status: DISCONTINUED | OUTPATIENT
Start: 2019-01-28 | End: 2019-01-30

## 2019-01-27 RX ORDER — METOPROLOL SUCCINATE 50 MG/1
100 TABLET, EXTENDED RELEASE ORAL DAILY
Status: DISCONTINUED | OUTPATIENT
Start: 2019-01-28 | End: 2019-01-30 | Stop reason: HOSPADM

## 2019-01-27 RX ORDER — METOPROLOL SUCCINATE 50 MG/1
100 TABLET, EXTENDED RELEASE ORAL DAILY
Status: DISCONTINUED | OUTPATIENT
Start: 2019-01-27 | End: 2019-01-27 | Stop reason: SDUPTHER

## 2019-01-27 RX ORDER — HYDRALAZINE HYDROCHLORIDE 20 MG/ML
20 INJECTION INTRAMUSCULAR; INTRAVENOUS
Status: DISCONTINUED | OUTPATIENT
Start: 2019-01-27 | End: 2019-01-30 | Stop reason: HOSPADM

## 2019-01-27 RX ORDER — ACETAMINOPHEN 325 MG/1
650 TABLET ORAL
Status: DISCONTINUED | OUTPATIENT
Start: 2019-01-27 | End: 2019-01-30 | Stop reason: HOSPADM

## 2019-01-27 RX ORDER — MAG HYDROX/ALUMINUM HYD/SIMETH 200-200-20
30 SUSPENSION, ORAL (FINAL DOSE FORM) ORAL
Status: DISCONTINUED | OUTPATIENT
Start: 2019-01-27 | End: 2019-01-30 | Stop reason: HOSPADM

## 2019-01-27 RX ORDER — SODIUM CHLORIDE 0.9 % (FLUSH) 0.9 %
5-10 SYRINGE (ML) INJECTION AS NEEDED
Status: DISCONTINUED | OUTPATIENT
Start: 2019-01-27 | End: 2019-01-30 | Stop reason: HOSPADM

## 2019-01-27 RX ORDER — ATORVASTATIN CALCIUM 20 MG/1
20 TABLET, FILM COATED ORAL
Status: DISCONTINUED | OUTPATIENT
Start: 2019-01-27 | End: 2019-01-30 | Stop reason: HOSPADM

## 2019-01-27 RX ORDER — PANTOPRAZOLE SODIUM 40 MG/1
40 TABLET, DELAYED RELEASE ORAL DAILY
Status: DISCONTINUED | OUTPATIENT
Start: 2019-01-28 | End: 2019-01-30 | Stop reason: HOSPADM

## 2019-01-27 RX ORDER — ONDANSETRON 2 MG/ML
2 INJECTION INTRAMUSCULAR; INTRAVENOUS
Status: DISCONTINUED | OUTPATIENT
Start: 2019-01-27 | End: 2019-01-30 | Stop reason: HOSPADM

## 2019-01-27 RX ORDER — AMLODIPINE BESYLATE 5 MG/1
10 TABLET ORAL DAILY
Status: DISCONTINUED | OUTPATIENT
Start: 2019-01-27 | End: 2019-01-30 | Stop reason: HOSPADM

## 2019-01-27 RX ADMIN — CEFTRIAXONE 1 G: 1 INJECTION, POWDER, FOR SOLUTION INTRAMUSCULAR; INTRAVENOUS at 09:55

## 2019-01-27 RX ADMIN — METHYLPREDNISOLONE SODIUM SUCCINATE 60 MG: 125 INJECTION, POWDER, FOR SOLUTION INTRAMUSCULAR; INTRAVENOUS at 23:43

## 2019-01-27 RX ADMIN — SODIUM CHLORIDE 305 ML: 900 INJECTION, SOLUTION INTRAVENOUS at 10:27

## 2019-01-27 RX ADMIN — SODIUM CHLORIDE 1000 ML: 900 INJECTION, SOLUTION INTRAVENOUS at 09:47

## 2019-01-27 RX ADMIN — HEPARIN SODIUM 5000 UNITS: 5000 INJECTION INTRAVENOUS; SUBCUTANEOUS at 23:42

## 2019-01-27 RX ADMIN — METOPROLOL TARTRATE 100 MG: 50 TABLET ORAL at 12:40

## 2019-01-27 RX ADMIN — HEPARIN SODIUM 5000 UNITS: 5000 INJECTION INTRAVENOUS; SUBCUTANEOUS at 15:50

## 2019-01-27 RX ADMIN — AMLODIPINE BESYLATE 10 MG: 5 TABLET ORAL at 15:36

## 2019-01-27 RX ADMIN — DEXTROSE MONOHYDRATE AND POTASSIUM CHLORIDE: 5; .149 INJECTION, SOLUTION INTRAVENOUS at 15:51

## 2019-01-27 RX ADMIN — SODIUM CHLORIDE 1000 MG: 900 INJECTION, SOLUTION INTRAVENOUS at 10:24

## 2019-01-27 NOTE — CONSULTS
Telepsychiatry Consult  Tele-psychiatry consult is done with the help of onsite staff. Case discussed with ER attending prior to consult. Patient location: [facility])  Physician location: [place]    Patient Name: Hayley Garcia  Date: 2019  Time: 1:28 PM   : 1925    Reason for consult:  AMS; medical decision making capacity; ability to live by self    History of Present Illness: Hayley Garcia is a 80 y.o.  woman from [facility] without known psych hx who presents via EMS due to AMS and hand pain. Reportedly she has been falling at home. In my interview, pt is able to give her name, , and the month (not year). She says she is deaf/hard of hearing. At one point she is not sure if she is in the [place] or [facility]. She is asked about the leaders of both countries and is not able to answer. She denies wanting to hurt herself or anyone else. She says she wants to go home. SI/HI/Self harm/Violence: denies; report of agitation at home    Sources of information: patient, EMR, hospital staff:    Psychiatric History/Treatment History: none reported     Drug/Alcohol History: no report of alcohol or drugs    Medical History:   Past Medical History:   Diagnosis Date    Arthritis     CAD (coronary artery disease)     3 coronary stents    Cardiac complication     Hypertension     Migraine 3/1/2015    Polymyalgia rhe[address]a ([facility] [address] 2015     Medications & Freq:   Prior to Admission medications    Medication Sig Start Date End Date Taking? Authorizing Provider   acetaminophen-codeine (TYLENOL #3) 300-30 mg per tablet Take 1 Tab by mouth every six (6) hours as needed for Pain. Max Daily Amount: 4 Tabs. 9/10/18   Christopher Musa NP   cloNIDine HCl (CATAPRES) 0.1 mg tablet Take 0.1 mg by mouth two (2) times a day. Provider, Historical   metoprolol tartrate (LOPRESSOR) 100 mg IR tablet Take 100 mg by mouth daily.     Provider, Historical   ciprofloxacin HCl (CILOXAN) 0.3 % ophthalmic solution Administer 2 Drops to left eye three (3) times daily. Provider, Historical   acetaminophen (TYLENOL PO) 300 mg by Buccal route two (2) times a day. Indications: pain    Provider, Historical   guaifenesin/dextromethorphan (GUAIFENESIN DM PO) Take 10 mL by mouth two (2) times daily as needed (cough). makes nauseated not taking    Provider, Historical   polyethylene glycol (MIRALAX) 17 gram packet Take 1 Packet by mouth nightly. 8/21/18   Jose Kirkland MD   atenolol (TENORMIN) 50 mg tablet Take 1 Tab by mouth every twelve (12) hours. Patient not taking: Reported on 9/4/2018 3/6/15   Jose Kirkland MD   acetaminophen (TYLENOL) 325 mg tablet Take 2 Tabs by mouth every six (6) hours as needed. 3/6/15   Jose Kirkland MD   pantoprazole (PROTONIX) 40 mg tablet Take 1 Tab by mouth Daily (before breakfast). 3/6/15   Jose Kirkland MD   predniSONE (DELTASONE) 10 mg tablet Take 0.5 Tabs by mouth daily. 2/26/13   Jose Kirkland MD   albuterol-ipratropium (DUO-NEB) 2.5 mg-0.5 mg/3 ml nebulizer solution 3 mL by Nebulization route three (3) times daily as needed for Wheezing. 2/20/13   Jose Kirkland MD   losartan-hydrochlorothiazide (HYZAAR) 100-25 mg per tablet Take 1 Tab by mouth daily. Ariel Ibarra MD   simvastatin (ZOCOR) 40 mg tablet Take  by mouth nightly. Ariel Ibarra MD   aspirin 81 mg tablet Take 81 mg by mouth. Ariel Ibarra MD   ergocalciferol (VITAMIN D2) 50,000 unit capsule Take 50,000 Units by mouth every seven (7) days. Ariel Ibarra MD     Allergies: No Known Allergies     Family Psych History/History of suicide: none reported  Family History   Problem Relation Age of Onset    Heart Disease Mother     Heart Disease Father      Social History:    Employment: not working   Living situation: living by self with care-giver who visits; son is in [facility] [address]: living by self?    Strengths: unclear    Mental Status Exam:   Appearance and attire: white woman who appears stated age; fair hygiene and grooming  Attitude and behavior: superficially cooperative  Speech: talkative at times; loud volume (deafness?)  Affect and mood: anxious to tearful affect  Association and thought processes: non-linear at times  Thought content: no SI, HI  Perception: not responding to internal stimuli Delusions: no trace paranoia  Sensorium and orientation: alert and oriented to self and partially to date (month and not year)  Memory and Intellectual functioning: average  Insight and judgment: dangerously poor    Impression/Risk Assessment:   Gabby Reeves is a 80 y.o. woman with unclear psychiatric hx who presents as above for AMS and right hand pain. She is being admitted for medical treatment. In my interview, pt is delirious and is asking to go home. She has no understanding of the benefits/risks of going home AMA and therefore has no capacity to refuse medical care. Her ability to live independently is beyond the scope of this current evaluation. Principal Diagnosis: Delirium    Other Diagnoses:  Patient Active Problem List   Diagnosis Code    Polymyalgia rheumatica ([facility] [address] M35.3    Altered mental status R41.82    CAD (coronary artery disease) I25.10    Hypomagnesemia E83.42    Syncope R55    Hypokalemia E87.6    HTN (hypertension) I10    Knee pain, left M25.562    Weakness R53.1    Delirium R41.0    Cellulitis of right hand L03. 113       Treatment Recommendations:  1. Disposition: inpatient medical; 1-1 for safety  2. Psychiatric medications:   haldol 0.5mg qid for delirum barring medical contraindications  Avoid benzo's which may increase confusion  3. Frequent Re-orientation  Defer orders to primary team  4.  Reconsult as necessary    Electronically signed by Mario Emerson M.D.

## 2019-01-27 NOTE — PROGRESS NOTES
Assumed care of patient. Patient arrived on the unit via stretcher from ED with ED tech. Patient alert and oriented x1, denies pain and discomfort. Patient resting quietly. Frequent use items within reach. Bed locked in low position. Call bell within reach and patient verbalized understanding of use for assistance and needs. Patient does not want to lock her valuables up in the hospital safe. Patient is unable to provide answers for the admission documentation. 1545:  Patient's private home care partner Kaylee Gomez arrived at the hospital to check in on the patient but was not comfortable answering admission documentation.

## 2019-01-27 NOTE — PROGRESS NOTES
Reason for Admission:  AMS/ Cellulitis right hand RRAT Score:  14 Do you (patient/family) have any concerns for transition/discharge? Pt is confused and not able to be interviewed. Psych consult for capacity ordered. Plan for utilizing home health:  Unlikely, needs SNF to LTC Likelihood of readmission? Mod  
         
Transition of Care Plan:    SNF to LTC MPOA: Lakshmi Gallo in [address] [phone] (this writer was unable to call this number from ED) MPOA document on chart. This son has Leukemia and undergoing treatment. Patient has 4 sons total, the other 3 sons haven't seen their mother in many years. Pt's care-giver(wasn't live-in until last Thursday): Alexey Cadena [phone] Pt is confused. Unable to contact son in [facility] at this time after multiple attempts. Call placed to [facility]) to obtain information. She reports the following: She began assisting patient in October 2017 and at that time was simply  helping get her groceries. .. Ms Palma Carbajal did notice a change in her the first of last year. She did assist with hiring caregivers for her at the request of the patient's Son/MPOA and the patient would make them leave as she was a very private person and didn't like people in her home. Son/MPOA visited this past summer and toured facilities. He also has access to patient's funds. Per Ms Palma Carbajal patient was interested in [facility] because she could afford their rates and was working with Pinky Abrams there. She has been very independent, doing her own cooking, cleaning, baking, paying bills up until 3 weeks ago. Last check she wrote was last Monday or Tuesday.  
 
She has had several falls, was found on the floor twice, and refused to go to the ED because the last time in the hospital, the pt felt the MD left her too long in the hospital.As of late pt has been sleeping 19-22 hours day. Has become paranoid of the caregiver. Since being  found on floor twice, the caregiver began staying with her last Thursday and reporting to the son in [facility] what was going on and that she was refusing to go to ED. Today EMS was called Per this caregiver patient has 3 accounts and ~ $[id number]. Psych consult requested for capacity. If patient is found to lack capacity--Will work with MPOA on [facility] plan which appears to be SNF to LTC. If pt found to have capacity--and if patient goes home, please contact APS to evaluate home situation. Placed in [place] but will not match until CM can speak to son/MPOA in [facility]. Care Management Interventions PCP Verified by CM: Yes(Dr Chen ( last appt in the summer)) Mode of Transport at Discharge: (Aura Riley [phone]) Transition of Care Consult (CM Consult): SNF Current Support Network: Own Home, Lives Alone(Had care-giver since 2017 (started to stay with her this past Thursday)) Confirm Follow Up Transport: (Caregiver- Florencio Severino) Plan discussed with Pt/Family/Caregiver: Yes Discharge Location Discharge Placement: Skilled nursing facility Marcelina Winters RN Outcomes Manager 
[phone]

## 2019-01-27 NOTE — ED TRIAGE NOTES
0830: EMS reports in the last couple of weeks pt has had multiple falls; C/o R hand pain and R hand swelling; combative x last couple of days per CG; calm and polite for EMS

## 2019-01-27 NOTE — ED PROVIDER NOTES
EMERGENCY DEPARTMENT HISTORY AND PHYSICAL EXAM 
 
8:46 AM 
 
 
Date: 1/27/2019 Patient Name: Guero Bob History of Presenting Illness Chief Complaint Patient presents with  
 Hand Pain  
  R hand pain History Provided By: Patient Chief Complaint: mental status change Duration:  unknown Timing:  Acute Location: n/a Quality: n/a Severity: Moderate Modifying Factors: n/a Associated Symptoms: erythema, swelling, right hand pain Additional History (Context): Guero Bob is a 80 y.o. female with hypertension who presents via EMS with acute, moderate mental status change limiting the HPI and ROS. Pt is alert, however, and c/o right hand pain which radiates to the right elbow and is associated with erythema and swelling. She denies fall. No other concerns or Sx at this time. PCP: Pina Bradford MD 
 
Current Facility-Administered Medications Medication Dose Route Frequency Provider Last Rate Last Dose  sodium chloride (NS) flush 5-10 mL  5-10 mL IntraVENous PRN Ketty Nunez DO      
 amLODIPine (NORVASC) tablet 10 mg  10 mg Oral DAILY Maryam Farmer MD   10 mg at 01/28/19 2630  aspirin chewable tablet 81 mg  81 mg Oral DAILY Maryam Farmer MD   81 mg at 01/28/19 0946  
 pantoprazole (PROTONIX) tablet 40 mg  40 mg Oral DAILY Maryam Farmer MD   40 mg at 01/28/19 6144  dextrose 5% with KCl 20 mEq/L infusion   IntraVENous CONTINUOUS Pina Bradford MD 50 mL/hr at 01/28/19 9469  acetaminophen (TYLENOL) tablet 650 mg  650 mg Oral Q4H PRN Maryam Farmer MD      
 ondansetron (ZOFRAN) injection 2 mg  2 mg IntraVENous Q4H PRN Maryam Farmer MD      
 alum-mag hydroxide-simeth (MYLANTA) oral suspension 30 mL  30 mL Oral Q4H PRN Maryam Farmer MD      
 LORazepam (ATIVAN) tablet 0.5 mg  0.5 mg Oral Q6H PRN Sebastián Farmer MD   0.5 mg at 01/28/19 0020  
 heparin (porcine) injection 5,000 Units  5,000 Units SubCUTAneous Q8H Isha Monahan MD   5,000 Units at 19 9511  
 atorvastatin (LIPITOR) tablet 20 mg  20 mg Oral QHS Neil Farmer MD      
 metoprolol succinate (TOPROL-XL) XL tablet 100 mg  100 mg Oral DAILY Neil Farmer MD   100 mg at 19 5799  hydrALAZINE (APRESOLINE) 20 mg/mL injection 20 mg  20 mg IntraVENous Q6H PRN Neil Farmer MD      
 predniSONE (DELTASONE) tablet 20 mg  20 mg Oral DAILY WITH BREAKFAST Neil Farmer MD   20 mg at 19 6621 Past History Past Medical History: 
Past Medical History:  
Diagnosis Date  Arthritis  CAD (coronary artery disease)   
 3 coronary stents  Cardiac complication  Hypertension  Migraine 3/1/2015  Polymyalgia rheumatica ([address] 2015 Past Surgical History: 
Past Surgical History:  
Procedure Laterality Date  CARDIAC SURG PROCEDURE UNLIST    
 3 stents Family History: 
Family History Problem Relation Age of Onset  Heart Disease Mother  Heart Disease Father Social History: 
Social History Tobacco Use  Smoking status: Former Smoker Last attempt to quit: 1/15/1980 Years since quittin.0  Smokeless tobacco: Never Used Substance Use Topics  Alcohol use: No  
 Drug use: Not on file Allergies: 
No Known Allergies Review of Systems Review of Systems Unable to perform ROS: Mental status change Physical Exam  
 
Visit Vitals /90 (BP 1 Location: Left arm, BP Patient Position: At rest) Pulse (!) 102 Temp 97.5 °F (36.4 °C) Resp 16 Ht 5' 4\" (1.626 m) Wt 43.5 kg (96 lb) SpO2 100% BMI 16.48 kg/m² Physical Exam  
Constitutional: She appears well-developed and well-nourished. HENT:  
Head: Normocephalic and atraumatic. Mouth/Throat: Oropharynx is clear and moist.  
Eyes: Pupils are equal, round, and reactive to light. No scleral icterus. Neck: Neck supple. No tracheal deviation present. Cardiovascular: Regular rhythm. Tachycardia present. No murmur heard. Pulmonary/Chest: Effort normal and breath sounds normal. No respiratory distress. Abdominal: Soft. There is no tenderness. Musculoskeletal: Normal range of motion. She exhibits no edema or deformity. Right shoulder: She exhibits tenderness (to palpation, 1st and 2nd MCP joints and right elbow) and bony tenderness (right wrist ). No pain with axial compression of thumb Neurological: She is alert. No cranial nerve deficit. No gross neuro deficit, oriented to person and place. Skin: Skin is warm and dry. No rash noted. She is not diaphoretic. There is erythema (dorsal surface right hand and wrist ). Psychiatric: Intermittently mildly confused but anwsers most questions appropriately. Nursing note and vitals reviewed. Diagnostic Study Results Labs - Labs Reviewed URINALYSIS W/ RFLX MICROSCOPIC - Abnormal; Notable for the following components:  
    Result Value Protein 30 (*) Bilirubin SMALL (*) All other components within normal limits METABOLIC PANEL, COMPREHENSIVE - Abnormal; Notable for the following components:  
 Potassium 3.1 (*)   
 CO2 35 (*) Glucose 101 (*) BUN 24 (*)   
 BUN/Creatinine ratio 32 (*) AST (SGOT) 40 (*) Albumin 2.6 (*) A-G Ratio 0.7 (*) All other components within normal limits CBC WITH AUTOMATED DIFF - Abnormal; Notable for the following components: RBC 4.00 (*) HGB 10.8 (*) HCT 34.3 (*)   
 RDW 15.0 (*) PLATELET 174 (*) NEUTROPHILS 79 (*) LYMPHOCYTES 13 (*) All other components within normal limits URINE MICROSCOPIC ONLY - Abnormal; Notable for the following components:  
 Amorphous Crystals 2+ (*) All other components within normal limits METABOLIC PANEL, BASIC - Abnormal; Notable for the following components:  
 Sodium 131 (*) Potassium 3.3 (*) Chloride 92 (*) Glucose 199 (*) All other components within normal limits C REACTIVE PROTEIN, QT - Abnormal; Notable for the following components: C-Reactive protein 14.4 (*) All other components within normal limits CULTURE, BLOOD CULTURE, BLOOD RHEUMATOID FACTOR, QL  
CYCLIC CITRUL PEPTIDE AB, IGG  
SED RATE (ESR) POC LACTIC ACID Radiologic Studies -  
XR HAND RT MIN 3 V Final Result IMPRESSION:  
  
No acute osseous abnormality. XR WRIST RT AP/LAT/OBL MIN 3V Final Result IMPRESSION:  
  
No acute abnormality. XR ELBOW RT MIN 3 V Final Result IMPRESSION:  
  
No acute abnormality. XR CHEST PORT Final Result IMPRESSION:  
  
No active cardiopulmonary disease. CT HEAD WO CONT Final Result IMPRESSION:  
  
1. No CT evidence of an acute intracranial abnormality - in particular, no  
acute cortical infarct, hemorrhage, or mass effect. 2.  Moderate cerebral atrophy/volume loss and periventricular white matter  
changes, most commonly seen with chronic microvascular disease. Medical Decision Making I am the first provider for this patient. I reviewed the vital signs, available nursing notes, past medical history, past surgical history, family history and social history. Vital Signs-Reviewed the patient's vital signs. Pulse Oximetry Analysis -  97% on room air WNL Cardiac Monitor: 
Rate: 105 bpm 
Rhythm:  Sinus Tachycardia EKG: 
Time: 9:45 AM 
Rate: 98 bpm 
Rhythm: NSR Interpretation: Nml axis, non-specific intraventricular conduction delay, non-specific ST-T wave changes, no acute ST elevation Records Reviewed: Nursing Notes and Old Medical Records (Time of Review: 8:46 AM) MDM, Progress Notes, Reevaluation, and Consults: The patient was given: 
Medications  
sodium chloride (NS) flush 5-10 mL (not administered) amLODIPine (NORVASC) tablet 10 mg (10 mg Oral Given 1/28/19 0946) aspirin chewable tablet 81 mg (81 mg Oral Given 1/28/19 0946) pantoprazole (PROTONIX) tablet 40 mg (40 mg Oral Given 1/28/19 0946) dextrose 5% with KCl 20 mEq/L infusion ( IntraVENous Rate Change 1/28/19 0947)  
acetaminophen (TYLENOL) tablet 650 mg (not administered)  
ondansetron (ZOFRAN) injection 2 mg (not administered) alum-mag hydroxide-simeth (MYLANTA) oral suspension 30 mL (not administered) LORazepam (ATIVAN) tablet 0.5 mg (0.5 mg Oral Given 1/28/19 0020) heparin (porcine) injection 5,000 Units (5,000 Units SubCUTAneous Given 1/28/19 0623)  
atorvastatin (LIPITOR) tablet 20 mg (20 mg Oral Refused 1/27/19 2343) metoprolol succinate (TOPROL-XL) XL tablet 100 mg (100 mg Oral Given 1/28/19 0946) hydrALAZINE (APRESOLINE) 20 mg/mL injection 20 mg (not administered) predniSONE (DELTASONE) tablet 20 mg (20 mg Oral Given 1/28/19 0946) vancomycin (VANCOCIN) 1,000 mg in 0.9% sodium chloride (MBP/ADV) 250 mL adv (0 mg IntraVENous IV Completed 1/27/19 1224) sodium chloride 0.9 % bolus infusion 1,000 mL (0 mL IntraVENous IV Completed 1/27/19 1026) Followed by  
sodium chloride 0.9 % bolus infusion 305 mL (0 mL IntraVENous IV Completed 1/27/19 1245) metoprolol tartrate (LOPRESSOR) tablet 100 mg (100 mg Oral Given 1/27/19 1240) methylPREDNISolone (PF) (Solu-MEDROL) injection 60 mg (60 mg IntraVENous Given 1/27/19 2343) 9:21 AM - I suspect that this patient has an active infection. PROVIDER SEPSIS PHYSICAL EXAM EVAL Vital signs reviewed (see nursing documentation for further details): Vitals:  
 01/27/19 2005 01/28/19 0000 01/28/19 0400 01/28/19 2485 BP: (!) 153/92 (!) 164/98 (!) 146/96 139/90 Pulse: 90 96 87 (!) 102 Resp: 16 16 14 16 Temp: 98.7 °F (37.1 °C) 98 °F (36.7 °C) 95.7 °F (35.4 °C) 97.5 °F (36.4 °C) SpO2: 96% 93% 98% 100% Cardiac exam:Tachycardic Pulmonary exam:Normal 
 
Peripheral pulses:Normal 
 
Capillary refill:Normal 
 
Skin exam:pink Exam performed by Romayne Bunkers, DO 
 
ED Course as of Jan 28 1213 Elie Larson Jan 27, 2019  
[age]F presents from home with left hand and elbow pain and reported fall by EMS 1 week ago. Pt is unable to provide any history. She has cellulitis of her dorsal right hand with TTP primarily overlying her 1st and 2nd MCP joints. He elbow is diffusely tender without deformity erythema or warmth. All other joints palpated and ranged and had no focal tenderness or deformity. Will obtain dedicated imaging of R hand, wrist and elbow. Pt has hx of falls, no anti-coagulation and is only oriented to self and location with unknown baseline and reported history of intermittent agitation at home so will obtain CT head imaging to eval for intracranial abnormality. On chart review pt has been mildly tachycardic on last 3 ED visits, she is on home metoprolol, unsure if she took this medication today so I will suspect sepsis in the meantime due to source of cellulitis in the hand and tachycardia on presentation. Fluids and broad spectrum abx are ordered in the meantime while labs are pending. Pt is afebrile and hemodynamically stable. [KG] 1054 Normal lactic acid and WBC, no longer concerned for sepsis. [KG] 1110 I spoke with caretaker Marcelina Cobb - she says in the last 7-8 days there has been a significant change. Bear Altamirano helped with groceries and bills checking on her a few times a week prior to this.  
6 days ago she found the patient on bathroom on floor. Pt did not want her to go the ER because she doesn't want to be in the hospital for a prolonged period of time. Pt has 4 sons in [facility], one son Bishop Parent wanted Shamika Mak to be her executer and POA, that son has leukemia and her children and not in good health. That son isn't healthy enough to come to [facility].  Ms. Kimberley Dunn went to check on her 4 days ago and when she came over the patient was on the floor and Ms. Kimberley Dunn stayed with her since that time. Pt has been sleeping 19-20 hours a day. Pt has no official DNR, but has made statement she wants to die at home and not in the hospital. Pt is more and more confused and newly agitated per care taker, she states \"it's like a switch flipped on her\". Pt stated she thought she had a stroke but didn't want to go to the hospital. Ms. Mary Brandt has been in contact with Umer Atkinson, Dr. Mika Stearns' nurse. [KG] [address] [facility] ED Course User Index [KG] Chris Arriaga DO  
 
 
12:06 PM Spoke with Dr. Gracie Olvera about the pt and admission for right hand cellulitis and AMS, change from baseline reported by caretaker. Discussed getting APS involved with . Critical Care Time: The services I provided to this patient were to treat and/or prevent clinically significant deterioration that could result in the failure of one or more body systems and/or organ systems due to change in mental status and concern for possible systemic infection. Services included the following: 
-reviewing nursing notes and old charts 
-vital sign assessments 
-direct patient care 
-medication orders and management 
-interpreting and reviewing diagnostic studies/labs 
-re-evaluations 
-documentation time Aggregate critical care time was 40 minutes, which includes only time during which I was engaged in work directly related to the patient's care as described above, whether I was at bedside or elsewhere in the Emergency Department. It did not include time spent performing other reported procedures or the services of residents, students, nurses, or advance practice providers. Chris Arriaga DO 
 
9:22 AM 
 
 
For Hospitalized Patients: 
 
1. Hospitalization Decision Time: The decision to hospitalize the patient was made by Dr. Florencia Scott at 1:45 PM on 1/27/2019 Diagnosis Clinical Impression: Cellulitis of the right hand, altered mental status. Disposition: admit Follow-up Information None Medication List  
  
ASK your doctor about these medications * TYLENOL PO 
  
* acetaminophen 325 mg tablet Commonly known as:  TYLENOL Take 2 Tabs by mouth every six (6) hours as needed. acetaminophen-codeine 300-30 mg per tablet Commonly known as:  TYLENOL #3 Take 1 Tab by mouth every six (6) hours as needed for Pain. Max Daily Amount: 4 Tabs. albuterol-ipratropium 2.5 mg-0.5 mg/3 ml Nebu Commonly known as:  DUO-NEB 
3 mL by Nebulization route three (3) times daily as needed for Wheezing. aspirin 81 mg tablet 
  
atenolol 50 mg tablet Commonly known as:  TENORMIN Take 1 Tab by mouth every twelve (12) hours. ciprofloxacin HCl 0.3 % ophthalmic solution Commonly known as:  CILOXAN 
  
cloNIDine HCl 0.1 mg tablet Commonly known as:  CATAPRES 
  
GUAIFENESIN DM PO 
  
losartan-hydroCHLOROthiazide 100-25 mg per tablet Commonly known as:  HYZAAR 
  
metoprolol tartrate 100 mg IR tablet Commonly known as:  LOPRESSOR 
  
pantoprazole 40 mg tablet Commonly known as:  PROTONIX Take 1 Tab by mouth Daily (before breakfast). polyethylene glycol 17 gram packet Commonly known as:  Samuel Bimler Take 1 Packet by mouth nightly. predniSONE 10 mg tablet Commonly known as:  Graydon [place] Take 0.5 Tabs by mouth daily. simvastatin 40 mg tablet Commonly known as:  ZOCOR 
  
VITAMIN D2 50,000 unit capsule Generic drug:  ergocalciferol * This list has 2 medication(s) that are the same as other medications prescribed for you. Read the directions carefully, and ask your doctor or other care provider to review them with you. Scribe Attestation [facility] acting as a scribe for and in the presence of Renzo Cain DO     
January 27, 2019 at 9:01 AM 
    
Provider Attestation:     
I personally performed the services described in the documentation, reviewed the documentation, as recorded by the scribe in my presence, and it accurately and completely records my words and actions. January 27, 2019 at 9:01 AM - Rakesh TOLBERT DO   
 
 
_______________________________

## 2019-01-27 NOTE — PROGRESS NOTES
Problem: Discharge Planning Goal: *Discharge to safe environment Outcome: Progressing Towards Goal 
SNF

## 2019-01-27 NOTE — PROGRESS NOTES
Pharmacy Dosing Services: Vancomycin Indication: SSTI Day of therapy: 1 Other Antimicrobials (Include dose, start day & day of therapy): 
Ceftriaxone 1 gm every 24 hours (started ) Loading dose (date given): 1000 mg on  Current Maintenance dose: new start Goal Vancomycin Level: 15-20 
(Trough 15-20 for most infections, 20 for meningitis/osteomyelitis, pre-HD level ~25) Vancomycin Level (if drawn): new start Significant Cultures:   
 - blood - pending Renal function stable? (unstable defined as SCr increase of 0.5 mg/dL or > 50% increase from baseline, whichever is greater) (Y/N): Y  
 
CAPD, Hemodialysis or Renal Replacement Therapy (Y/N): N Recent Labs  
  19 
0930 CREA 0.76  
BUN 24* WBC 8.9 Temp (24hrs), Av °F (36.7 °C), Min:97.6 °F (36.4 °C), Max:98.3 °F (36.8 °C) Creatinine Clearance (Creatinine Clearance (ml/min)): 32 mL/min Regimen assessment: vancomycin 1000 mg loading once, follow by 750 mg every 24 hours Maintenance dose: new start Next scheduled level: early trough  at 1030 Pharmacy will follow daily and adjust medications as appropriate for renal function and/or serum levels. Thank you, DARA Jones

## 2019-01-27 NOTE — ED NOTES
Son Olga Lidia Albrecht - -[phone], son lives in [facility]

## 2019-01-28 LAB
ANION GAP SERPL CALC-SCNC: 10 MMOL/L (ref 3–18)
ATRIAL RATE: 98 BPM
BUN SERPL-MCNC: 12 MG/DL (ref 7–18)
BUN/CREAT SERPL: 18 (ref 12–20)
CALCIUM SERPL-MCNC: 8.8 MG/DL (ref 8.5–10.1)
CALCULATED P AXIS, ECG09: 84 DEGREES
CALCULATED R AXIS, ECG10: 24 DEGREES
CALCULATED T AXIS, ECG11: 42 DEGREES
CHLORIDE SERPL-SCNC: 92 MMOL/L (ref 100–108)
CO2 SERPL-SCNC: 29 MMOL/L (ref 21–32)
CREAT SERPL-MCNC: 0.67 MG/DL (ref 0.6–1.3)
CRP SERPL-MCNC: 14.4 MG/DL (ref 0–0.3)
DIAGNOSIS, 93000: NORMAL
ERYTHROCYTE [SEDIMENTATION RATE] IN BLOOD: 81 MM/HR (ref 0–30)
GLUCOSE SERPL-MCNC: 199 MG/DL (ref 74–99)
P-R INTERVAL, ECG05: 174 MS
POTASSIUM SERPL-SCNC: 3.3 MMOL/L (ref 3.5–5.5)
Q-T INTERVAL, ECG07: 374 MS
QRS DURATION, ECG06: 102 MS
QTC CALCULATION (BEZET), ECG08: 477 MS
SODIUM SERPL-SCNC: 131 MMOL/L (ref 136–145)
VENTRICULAR RATE, ECG03: 98 BPM

## 2019-01-28 PROCEDURE — 74011636637 HC RX REV CODE- 636/637: Performed by: INTERNAL MEDICINE

## 2019-01-28 PROCEDURE — 74011250636 HC RX REV CODE- 250/636: Performed by: INTERNAL MEDICINE

## 2019-01-28 PROCEDURE — 80048 BASIC METABOLIC PNL TOTAL CA: CPT

## 2019-01-28 PROCEDURE — 86431 RHEUMATOID FACTOR QUANT: CPT

## 2019-01-28 PROCEDURE — 85652 RBC SED RATE AUTOMATED: CPT

## 2019-01-28 PROCEDURE — 74011250637 HC RX REV CODE- 250/637: Performed by: INTERNAL MEDICINE

## 2019-01-28 PROCEDURE — 77030037878 HC DRSG MEPILEX >48IN BORD MOLN -B

## 2019-01-28 PROCEDURE — 86200 CCP ANTIBODY: CPT

## 2019-01-28 PROCEDURE — 36415 COLL VENOUS BLD VENIPUNCTURE: CPT

## 2019-01-28 PROCEDURE — 65270000029 HC RM PRIVATE

## 2019-01-28 PROCEDURE — 86140 C-REACTIVE PROTEIN: CPT

## 2019-01-28 RX ADMIN — DEXTROSE MONOHYDRATE AND POTASSIUM CHLORIDE: 5; .149 INJECTION, SOLUTION INTRAVENOUS at 16:35

## 2019-01-28 RX ADMIN — PANTOPRAZOLE SODIUM 40 MG: 40 TABLET, DELAYED RELEASE ORAL at 09:46

## 2019-01-28 RX ADMIN — AMLODIPINE BESYLATE 10 MG: 5 TABLET ORAL at 09:46

## 2019-01-28 RX ADMIN — ASPIRIN 81 MG 81 MG: 81 TABLET ORAL at 09:46

## 2019-01-28 RX ADMIN — METOPROLOL SUCCINATE 100 MG: 50 TABLET, EXTENDED RELEASE ORAL at 09:46

## 2019-01-28 RX ADMIN — DEXTROSE MONOHYDRATE AND POTASSIUM CHLORIDE: 5; .149 INJECTION, SOLUTION INTRAVENOUS at 03:07

## 2019-01-28 RX ADMIN — HEPARIN SODIUM 5000 UNITS: 5000 INJECTION INTRAVENOUS; SUBCUTANEOUS at 22:00

## 2019-01-28 RX ADMIN — HEPARIN SODIUM 5000 UNITS: 5000 INJECTION INTRAVENOUS; SUBCUTANEOUS at 06:23

## 2019-01-28 RX ADMIN — ATORVASTATIN CALCIUM 20 MG: 20 TABLET, FILM COATED ORAL at 22:00

## 2019-01-28 RX ADMIN — PREDNISONE 20 MG: 20 TABLET ORAL at 09:46

## 2019-01-28 RX ADMIN — LORAZEPAM 0.5 MG: 0.5 TABLET ORAL at 00:20

## 2019-01-28 RX ADMIN — HEPARIN SODIUM 5000 UNITS: 5000 INJECTION INTRAVENOUS; SUBCUTANEOUS at 14:46

## 2019-01-28 NOTE — PROGRESS NOTES
conducted an initial consultation and Spiritual Assessment for Marlo Ortiz, who is a 80 y.o.,female. Patients Primary Language is: [place]. According to the patients EMR Zoroastrian Affiliation is: [place]. The reason the Patient came to the hospital is:  
Patient Active Problem List  
 Diagnosis Date Noted  Delirium 01/27/2019  Cellulitis of right hand 01/27/2019  Syncope 08/01/2018  Hypokalemia 08/01/2018  
 HTN (hypertension) 08/01/2018  Knee pain, left 08/01/2018  Weakness 08/01/2018  Hypomagnesemia 07/31/2018  Altered mental status 03/02/2015  CAD (coronary artery disease) 03/02/2015  Polymyalgia rheumatica ([facility] [address] 02/28/2015 The  provided the following Interventions: 
 attempted to Initiate a relationship of care and support with patient in room 2105 this morning. Found patient resting peacefully at this time no family present and therefore unable to communicate. Larissa Rapp Offered prayer and assurance of continued prayers on patients behalf. Assessment: 
Patient does not have any Scientology/cultural needs that will affect patients preferences in health care. There are no further spiritual or Scientology issues which require Spiritual Care Services interventions at this time. Plan: 
Chaplains will continue to follow and will provide pastoral care on an as needed/requested basis Larissa Keen 3 Board Certified [employer] Spiritual Care  
[phone]

## 2019-01-28 NOTE — PROGRESS NOTES
Nutrition initial assessment/Plan of care RECOMMENDATIONS:  
1. FL Diet; advance when medically indicated and per Pt tolerance 2. Ensure Enlive TID (strawberry) 3. Monitor labs, weight and PO intake 4. RD to follow GOALS:  
1. PO intake meets >75% of protein/calorie needs by 1/31 2. Weight Maintenance/gradual gain (1-2 lb/week) by 2/4 ASSESSMENT:  
Wt status is classified as underweight per BMI of 16.5. Noted per documented weight records Pt was 126 lb in (08/2018) equating to a total loss of 30 lb or 23.8% x 6 months with 14 lb or 12.7% of that over the past 6 weeks. Poor PO intake per vitals (10-20%). Labs noted. Nutrition recommendations listed. RD to follow. Nutrition Diagnoses:  
Unintentional weight loss related to inadequate energy intake PTA as evidenced by a total loss of 30 lb or 23.8% x 6 months with 14 lb or 12.7% of that over the past 6 weeks per documented weight records. Inadequate PO intake related to AMS/decreased appetite as evidenced by <25% of full liquid tray consumed. Meets Criteria for Acute Malnutrition  
[x] Severe Malnutrition, as evidenced by: 
 [x] Moderate muscle wasting, loss of subcutaneous fat 
 [] Nutritional intake of <50% of recommended intake for >5 days [x] Weight loss of >1-2% in 1 week, >5% in 1 month, >7.5% in 3 months, or >10% in 6 months 
 [] Moderate-severe edema Nutrition Risk:  [x] High  [] Moderate []  Low SUBJECTIVE/OBJECTIVE:  
 Pt admitted for AMS and cellulitis of R hand. Familiar with Pt from previous admission. Noted per documented weight records Pt was 126 lb in (08/2018) equating to a total loss of 30 lb or 23.8% x 6 months with 14 lb or 12.7% of that over the past 6 weeks. Pt seen in room resting later in the day. Stated she just hasn't been hungry. Weight taken with bed scale during visit; 99 lb. Pt with poor PO intake of full liquid diet.  Will order Ensure Enlive to try to get additional calories/protein. Encourage intake and will follow. Information Obtained from:  
 [x] Chart Review [x] Patient 
 [] Family/Caregiver [x] Nurse/Physician/CNA [] Interdisciplinary Meeting/Rounds Diet: Full Liquid Diet Medications: [x] Reviewed Allergies: [x] Reviewed Patient Active Problem List  
Diagnosis Code  Polymyalgia rheumatica (HCC) M35.3  Altered mental status R41.82  
 CAD (coronary artery disease) I25.10  Hypomagnesemia E83.42  Syncope R55  Hypokalemia E87.6  
 HTN (hypertension) I10  
 Knee pain, left M25.562  Weakness R53.1  Delirium R41.0  Cellulitis of right hand L03.113 Past Medical History:  
Diagnosis Date  Arthritis  CAD (coronary artery disease) 2001  
 3 coronary stents  Cardiac complication  Hypertension  Migraine 3/1/2015  Polymyalgia rheumatica ([facility] [address] 2/28/2015 Labs:   
Lab Results Component Value Date/Time Sodium 144 01/27/2019 09:30 AM  
 Potassium 3.1 (L) 01/27/2019 09:30 AM  
 Chloride 101 01/27/2019 09:30 AM  
 CO2 35 (H) 01/27/2019 09:30 AM  
 Anion gap 8 01/27/2019 09:30 AM  
 Glucose 101 (H) 01/27/2019 09:30 AM  
 BUN 24 (H) 01/27/2019 09:30 AM  
 Creatinine 0.76 01/27/2019 09:30 AM  
 Calcium 9.7 01/27/2019 09:30 AM  
 Magnesium 0.8 (LL) 09/20/2018 03:00 PM  
 Albumin 2.6 (L) 01/27/2019 09:30 AM  
 
Anthropometrics:   
Last 3 Recorded Weights in this Encounter 01/27/19 9489 Weight: 43.5 kg (96 lb) Ht Readings from Last 1 Encounters:  
12/15/18 5' 4\" (1.626 m) Weight Metrics 1/27/2019 12/15/2018 9/10/2018 8/26/2018 8/4/2018 8/4/2018 3/5/2015 Weight 96 lb 110 lb 119 lb 116 lb 123 lb 9.6 oz 126 lb 153 lb BMI 16.48 kg/m2 18.88 kg/m2 19.8 kg/m2 19.91 kg/m2 20.57 kg/m2 20.97 kg/m2 25.46 kg/m2 Patient Vitals for the past 100 hrs: 
 % Diet Eaten 01/28/19 1232 10 % 01/27/19 1830 20 % IBW: 120 lb %IBW: 80% UBW: 126 lb x 6 months ago [x] Weight Loss [] Weight Gain [] Weight Stable Estimated Nutrition Needs: [x] MSJ  [] Other: 
Calories: 8186-0332 kcal Based on:   [x] Actual BW   
Protein:   60-65 g Based on:   [x] Actual BW Fluid:       6539-7923 ml Based on:   [x] Actual BW  
 
 [x] No Cultural, Presybeterian or ethnic dietary need identified. [] Cultural, Presybeterian and ethnic food preferences identified and addressed Wt Status:  [] Normal (18.6 - 24.9) [x] Underweight (<18.5) [] Overweight (25 - 29.9) [] Mild Obesity (30 - 34.9)  [] Moderate Obesity (35 - 39.9) [] Morbid Obesity (40+) Nutrition Problems Identified:  
[x] Suboptimal PO intake  
[] Food Allergies [] Difficulty chewing/swallowing/poor dentition 
[] Constipation/Diarrhea  
[] Nausea/Vomiting  
[] None 
[x] Other: Weight loss Plan:  
[x] Therapeutic Diet 
[]  Obtained/adjusted food preferences/tolerances and/or snacks options [x]  Supplements added  
[] Occupational therapy following for feeding techniques []  HS snack added  
[]  Modify diet texture  
[]  Modify diet for food allergies []  Educate patient  
[]  Assist with menu selection  
[x]  Monitor PO intake on meal rounds  
[x]  Continue inpatient monitoring and intervention  
[]  Participated in discharge planning/Interdisciplinary rounds/Team meetings  
[]  Other:  
 
Education Needs: 
 [x] Not appropriate for teaching at this time  
 [] Identified and addressed Nutrition Monitoring and Evaluation: 
[x] Continue ongoing monitoring and intervention 
[] Stacey Moore

## 2019-01-28 NOTE — PROGRESS NOTES
Patient received in bed awaken for am assessment. Patient not answering assessment questions and unable to verbalize if in pain; Behavior indicators negative. No distress noted. Frequently use items within reach. Bed locked in low position. Call bell within reach.

## 2019-01-28 NOTE — PROGRESS NOTES
Physical Exam  
Skin:  
 
  
  Dual skin assessment conducted with PERICO Motley. No pressure ulcers noted. Only skin breakdown is the cellulitis to the right hand.

## 2019-01-28 NOTE — PROGRESS NOTES
Problem: Falls - Risk of 
Goal: *Absence of Falls Document Negrita Nath Fall Risk and appropriate interventions in the flowsheet. Outcome: Progressing Towards Goal 
Fall Risk Interventions: 
Mobility Interventions: Bed/chair exit alarm Mentation Interventions: Adequate sleep, hydration, pain control, Bed/chair exit alarm, Door open when patient unattended Medication Interventions: Bed/chair exit alarm Elimination Interventions: Bed/chair exit alarm, Toileting schedule/hourly rounds Problem: Pressure Injury - Risk of 
Goal: *Prevention of pressure injury Document Josiah Scale and appropriate interventions in the flowsheet. Outcome: Progressing Towards Goal 
Pressure Injury Interventions: 
Sensory Interventions: Assess changes in LOC Moisture Interventions: Absorbent underpads Activity Interventions: Pressure redistribution bed/mattress(bed type) Mobility Interventions: Pressure redistribution bed/mattress (bed type) Nutrition Interventions: Document food/fluid/supplement intake Friction and Shear Interventions: HOB 30 degrees or less

## 2019-01-28 NOTE — PROGRESS NOTES
Beside and verbal  Shift report given to [employer], RN(oncoming nurse) by Alvino Hernandez RN, (off going nurse). Report include the following information, SBAR, KARDEX, MAR, Recent results, and intake and output. Dual skin assessment performed. And nurse was informed of patients hearing aids in a bag in her closet.

## 2019-01-28 NOTE — ROUTINE PROCESS
Bedside and Verbal shift change report given to [address] (oncoming nurse) by Terry Cordoba RN 
 (offgoing nurse). Report included the following information SBAR, Kardex, MAR and Recent Results.

## 2019-01-28 NOTE — H&P
History & Physical 
Patient: Vanesa Hirsch MRN: [id number]  CSN: [id number] YOB: 1925  Age: 80 y.o. Sex: female DOA: 1/27/2019 Hospital Problems  Date Reviewed: 3/6/2015 Codes Class Noted POA Delirium ICD-10-CM: R41.0 ICD-9-CM: 780.09  1/27/2019 Unknown Cellulitis of right hand ICD-10-CM: L03.113 ICD-9-CM: 682.4  1/27/2019 Unknown Altered mental status ICD-10-CM: R41.82 
ICD-9-CM: 780.97  3/2/2015 Unknown No right hand cellulitis this gout! HPI:  
 
Elderly female with active medical problems as outline below presents with generalyzed weakness, inability to ambulate Worsening confusion and right hand pain ACTIVE MEDICAL PROBLEMS/PMH/PSH+ Syncopal episode, undetermined etiology. Fall Generalized deconditioning. Hypomagnesemia, hypokalemia -- corrected. Severe osteoarthritis, left knee Hypertension. Coronary artery disease status post coronary stenting. Hypercholesterolemia. Osteoarthritis. Polymyalgia rheumatica. Mild cognitive impairment. Chronic kidney disease, stage II. Gastroesophageal reflux disease. Osteoporosis. COPD. GOUT Hypertensive urgecy MEDICATIONS:   
1. Tylenol 650 q.6 p.r.n.   
2.  Albuterol and Atrovent nebulized treatment 3 times daily as needed. 3.  Aspirin 81 mg a day. 4.  Atenolol 50 mg every 12 hours. 5.  Colace twice daily. 6.  Hyzaar 100/25 one a day. 7.  Protonix 40 mg a day. 8.  MiraLax powder 17 grams a day. 9.  Prednisone 5 mg a day. 10.  Zocor 40 mg daily. 11.  Vitamin D2 50,000 units weekly. 12.  Calcium twice daily. Patient Active Problem List  
Diagnosis Code  Polymyalgia rheumatica (HCC) M35.3  Altered mental status R41.82  
 CAD (coronary artery disease) I25.10  Hypomagnesemia E83.42  Syncope R55  Hypokalemia E87.6  
 HTN (hypertension) I10  
 Knee pain, left M25.562  Weakness R53.1  Delirium R41.0  Cellulitis of right hand L03.113 PMH: 
Past Medical History:  
Diagnosis Date  Arthritis  CAD (coronary artery disease) 2001  
 3 coronary stents  Cardiac complication  Hypertension  Migraine 3/1/2015  Polymyalgia rheumatica ([address] 2/28/2015 PSH: 
Past Surgical History:  
Procedure Laterality Date  CARDIAC SURG PROCEDURE UNLIST    
 3 stents MEDICATIONS Medications Prior to Admission Medication Sig  
 acetaminophen-codeine (TYLENOL #3) 300-30 mg per tablet Take 1 Tab by mouth every six (6) hours as needed for Pain. Max Daily Amount: 4 Tabs.  cloNIDine HCl (CATAPRES) 0.1 mg tablet Take 0.1 mg by mouth two (2) times a day.  metoprolol tartrate (LOPRESSOR) 100 mg IR tablet Take 100 mg by mouth daily.  ciprofloxacin HCl (CILOXAN) 0.3 % ophthalmic solution Administer 2 Drops to left eye three (3) times daily.  acetaminophen (TYLENOL PO) 300 mg by Buccal route two (2) times a day. Indications: pain  
 guaifenesin/dextromethorphan (GUAIFENESIN DM PO) Take 10 mL by mouth two (2) times daily as needed (cough). makes nauseated not taking  polyethylene glycol (MIRALAX) 17 gram packet Take 1 Packet by mouth nightly.  atenolol (TENORMIN) 50 mg tablet Take 1 Tab by mouth every twelve (12) hours. (Patient not taking: Reported on 9/4/2018)  acetaminophen (TYLENOL) 325 mg tablet Take 2 Tabs by mouth every six (6) hours as needed.  pantoprazole (PROTONIX) 40 mg tablet Take 1 Tab by mouth Daily (before breakfast).  predniSONE (DELTASONE) 10 mg tablet Take 0.5 Tabs by mouth daily.  albuterol-ipratropium (DUO-NEB) 2.5 mg-0.5 mg/3 ml nebulizer solution 3 mL by Nebulization route three (3) times daily as needed for Wheezing.  losartan-hydrochlorothiazide (HYZAAR) 100-25 mg per tablet Take 1 Tab by mouth daily.  simvastatin (ZOCOR) 40 mg tablet Take  by mouth nightly.  aspirin 81 mg tablet Take 81 mg by mouth.  ergocalciferol (VITAMIN D2) 50,000 unit capsule Take 50,000 Units by mouth every seven (7) days. ALLERGIES: NKA Family History Problem Relation Age of Onset  Heart Disease Mother  Heart Disease Father Social History Socioeconomic History  Marital status:  Spouse name: Not on file  Number of children: Not on file  Years of education: Not on file  Highest education level: Not on file Social Needs  Financial resource strain: Not on file  Food insecurity - worry: Not on file  Food insecurity - inability: Not on file  Transportation needs - medical: Not on file  Transportation needs - non-medical: Not on file Occupational History  Not on file Tobacco Use  Smoking status: Former Smoker Last attempt to quit: 1/15/1980 Years since quittin.0  Smokeless tobacco: Never Used Substance and Sexual Activity  Alcohol use: No  
 Drug use: Not on file  Sexual activity: Not on file Other Topics Concern  Not on file Social History Narrative  Not on file  
 
  
FAMILY HISTORY Father: Mother: 
Brothers: 
Sisters : 
Children 
  
SOCIAL HISTORY 
SOCIAL HISTORY: . Children live in [place], I believe,  nonsmoker, nondrinker. 
  
FAMILY HISTORY:  The patient is unable to provide. Marital Status: , lives alone Education Completed: Occupation: 
Tobacco use:no Alcohol use: no 
Drug use: no 
STI: no 
Living will: ? POA: ? 
 
 
ROS: unable to obtain Physical Exam:  
  
Visit Vitals BP (!) 183/93 (BP 1 Location: Left arm, BP Patient Position: At rest) Pulse 99 Temp 97.6 °F (36.4 °C) Resp 16 Wt 43.5 kg (96 lb) SpO2 94% BMI 16.48 kg/m² GENERAL: Feeble NAD verbal  confused HEENT:   
Face:Symmetrical.  
CONJUNCTIVA: Pink LENSES: Artificial OU SCLERA: Anicteric   
ORAL MUCOSA: Dry 
TONGUE: Dry 
TEETH: upper dentures . Multiple extractions GUMS: No bleeding or swelling HARD SOFT/ PALATE/TONSILS: No Swelling. No ulcers. No Masses OROPHARYNX: WNL NECK: No JVD. No masses. No enlarged-tender lymph nodes.  Trachea in mid line.  THYROID: Size normal. No thyroid nodules   
CAROTID ARTERIES: Pulsation 2+ bilaterally. No bruits LUNGS: CTA. BS Normal Bilaterally HEART: RRR   Normal S1 S2. No Significant murmur. No S3 S4 ABDOMEN: Soft. Normal BS. No tenderness. No HSM /SMG. No palpable enlarged aorta. No bruits. LE: No edema. PULSES: Radial 2+; Femoral 2+; Posterior Tibialis nonpalpable bilaterally SKIN:No rash. No ecchymosis. No petechiae MUSCULOSKELETAL:     
NEUROLOGIC:    
Confused Muscle Strength, Bulk & Tone RUE: strength, bulk & tone: WNL 
LUE: strength, bulk & tone: WNL 
RLE: strength, bulk & tone: WNL 
LLE: strength, bulk & tone: WNL 
  
Reflexes:   
RUE: biceps 2+; triceps 2+; brachioradialis 2+   
LUE: biceps 2+; triceps 2+; brachioradialis 2+                   
RLE:patellar 2+; ankle 2 +; Babinski: plantarflexion LLE: patellar 2+; ankle 2+; Babinski: plantarflexion 
  
PSYCHIATRIC   
            
Oriented to Person   and  Place   not Time              
Mood: Not Depressed. Not  Anxious                                     
Affect:appropiate     
 
Labs Reviewed: 
 
Recent Results (from the past 24 hour(s)) CULTURE, BLOOD Collection Time: 01/27/19  9:30 AM  
Result Value Ref Range Special Requests: PERIPHERAL Culture result: PENDING   
CULTURE, BLOOD Collection Time: 01/27/19  9:30 AM  
Result Value Ref Range Special Requests: PERIPHERAL Culture result: PENDING   
METABOLIC PANEL, COMPREHENSIVE Collection Time: 01/27/19  9:30 AM  
Result Value Ref Range Sodium 144 136 - 145 mmol/L Potassium 3.1 (L) 3.5 - 5.5 mmol/L Chloride 101 100 - 108 mmol/L  
 CO2 35 (H) 21 - 32 mmol/L Anion gap 8 3.0 - 18 mmol/L Glucose 101 (H) 74 - 99 mg/dL BUN 24 (H) 7.0 - 18 MG/DL  Creatinine 0.76 0.6 - 1.3 MG/DL  
 BUN/Creatinine ratio 32 (H) 12 - 20 GFR est AA >60 >60 ml/min/1.73m2 GFR est non-AA >60 >60 ml/min/1.73m2 Calcium 9.7 8.5 - 10.1 MG/DL Bilirubin, total 0.6 0.2 - 1.0 MG/DL  
 ALT (SGPT) 23 13 - 56 U/L  
 AST (SGOT) 40 (H) 15 - 37 U/L Alk. phosphatase 114 45 - 117 U/L Protein, total 6.4 6.4 - 8.2 g/dL Albumin 2.6 (L) 3.4 - 5.0 g/dL Globulin 3.8 2.0 - 4.0 g/dL A-G Ratio 0.7 (L) 0.8 - 1.7    
CBC WITH AUTOMATED DIFF Collection Time: 01/27/19  9:30 AM  
Result Value Ref Range WBC 8.9 4.6 - 13.2 K/uL  
 RBC 4.00 (L) 4.20 - 5.30 M/uL  
 HGB 10.8 (L) 12.0 - 16.0 g/dL HCT 34.3 (L) 35.0 - 45.0 % MCV 85.8 74.0 - 97.0 FL  
 MCH 27.0 24.0 - 34.0 PG  
 MCHC 31.5 31.0 - 37.0 g/dL  
 RDW 15.0 (H) 11.6 - 14.5 % PLATELET 187 (H) 787 - 420 K/uL MPV 10.0 9.2 - 11.8 FL  
 NEUTROPHILS 79 (H) 40 - 73 % LYMPHOCYTES 13 (L) 21 - 52 % MONOCYTES 7 3 - 10 % EOSINOPHILS 1 0 - 5 % BASOPHILS 0 0 - 2 %  
 ABS. NEUTROPHILS 7.0 1.8 - 8.0 K/UL  
 ABS. LYMPHOCYTES 1.2 0.9 - 3.6 K/UL  
 ABS. MONOCYTES 0.6 0.05 - 1.2 K/UL  
 ABS. EOSINOPHILS 0.1 0.0 - 0.4 K/UL  
 ABS. BASOPHILS 0.0 0.0 - 0.1 K/UL  
 DF AUTOMATED    
POC LACTIC ACID Collection Time: 01/27/19  9:36 AM  
Result Value Ref Range Lactic Acid (POC) 1.14 0.40 - 2.00 mmol/L  
URINALYSIS W/ RFLX MICROSCOPIC Collection Time: 01/27/19 10:15 AM  
Result Value Ref Range Color DARK YELLOW Appearance CLEAR Specific gravity 1.025 1.005 - 1.030    
 pH (UA) 5.5 5.0 - 8.0 Protein 30 (A) NEG mg/dL Glucose NEGATIVE  NEG mg/dL Ketone NEGATIVE  NEG mg/dL Bilirubin SMALL (A) NEG Blood NEGATIVE  NEG Urobilinogen 1.0 0.2 - 1.0 EU/dL Nitrites NEGATIVE  NEG Leukocyte Esterase NEGATIVE  NEG    
URINE MICROSCOPIC ONLY Collection Time: 01/27/19 10:15 AM  
Result Value Ref Range WBC 0 to 1 0 - 4 /hpf  
 RBC 0 to 1 0 - 5 /hpf Epithelial cells 1+ 0 - 5 /lpf  Bacteria NEGATIVE  NEG /hpf  
 Amorphous Crystals 2+ (A) NEG  
 
 
ASSESSMENT 
AMS Uncontrolled HTN Right wrist swollen and  Tenderness suspect acute gout PLAN Orders written D/C antiobiotics Solumedrol then prednisone Edith De León MD 
1/27/2019, 7:40 PM

## 2019-01-28 NOTE — PROGRESS NOTES
Problem: Pressure Injury - Risk of 
Goal: *Prevention of pressure injury Document Josiah Scale and appropriate interventions in the flowsheet. Outcome: Progressing Towards Goal 
Pressure Injury Interventions: 
Sensory Interventions: Assess changes in LOC Moisture Interventions: Absorbent underpads Activity Interventions: Pressure redistribution bed/mattress(bed type) Mobility Interventions: Pressure redistribution bed/mattress (bed type) Nutrition Interventions: Document food/fluid/supplement intake Friction and Shear Interventions: HOB 30 degrees or less

## 2019-01-28 NOTE — PROGRESS NOTES
Assume care of patient from PERICO medley pt awake in bed, A&Ox1  ,no distress noted and denies pain. Frequently used items and call bell within reach. Pt verbalized understanding to use call bell for any needs or assistance. Bed lock in lowest position. Bed alarm on and door left open.

## 2019-01-29 PROBLEM — G31.84 MCI (MILD COGNITIVE IMPAIRMENT): Status: ACTIVE | Noted: 2019-01-29

## 2019-01-29 PROBLEM — E87.6 HYPOKALEMIA: Status: RESOLVED | Noted: 2018-08-01 | Resolved: 2019-01-29

## 2019-01-29 PROBLEM — M25.562 KNEE PAIN, LEFT: Status: RESOLVED | Noted: 2018-08-01 | Resolved: 2019-01-29

## 2019-01-29 PROBLEM — R53.1 WEAKNESS: Status: RESOLVED | Noted: 2018-08-01 | Resolved: 2019-01-29

## 2019-01-29 PROBLEM — I10 HTN (HYPERTENSION): Status: RESOLVED | Noted: 2018-08-01 | Resolved: 2019-01-29

## 2019-01-29 PROBLEM — M35.3 PMR (POLYMYALGIA RHEUMATICA) (HCC): Status: ACTIVE | Noted: 2019-01-29

## 2019-01-29 PROBLEM — R55 SYNCOPE: Status: RESOLVED | Noted: 2018-08-01 | Resolved: 2019-01-29

## 2019-01-29 PROBLEM — R41.0 DELIRIUM: Status: RESOLVED | Noted: 2019-01-27 | Resolved: 2019-01-29

## 2019-01-29 PROBLEM — I10 HTN (HYPERTENSION): Status: ACTIVE | Noted: 2019-01-29

## 2019-01-29 PROBLEM — E83.42 HYPOMAGNESEMIA: Status: RESOLVED | Noted: 2018-07-31 | Resolved: 2019-01-29

## 2019-01-29 PROBLEM — M19.049 HAND ARTHRITIS: Status: ACTIVE | Noted: 2019-01-29

## 2019-01-29 PROBLEM — I25.10 CAD (CORONARY ARTERY DISEASE): Status: ACTIVE | Noted: 2019-01-29

## 2019-01-29 PROBLEM — L03.113 CELLULITIS OF RIGHT HAND: Status: RESOLVED | Noted: 2019-01-27 | Resolved: 2019-01-29

## 2019-01-29 PROBLEM — E87.6 HYPOKALEMIA: Status: ACTIVE | Noted: 2019-01-29

## 2019-01-29 LAB
ANION GAP SERPL CALC-SCNC: 11 MMOL/L (ref 3–18)
BUN SERPL-MCNC: 16 MG/DL (ref 7–18)
BUN/CREAT SERPL: 24 (ref 12–20)
CALCIUM SERPL-MCNC: 8.8 MG/DL (ref 8.5–10.1)
CCP IGA+IGG SERPL IA-ACNC: 5 UNITS (ref 0–19)
CHLORIDE SERPL-SCNC: 91 MMOL/L (ref 100–108)
CO2 SERPL-SCNC: 30 MMOL/L (ref 21–32)
CREAT SERPL-MCNC: 0.66 MG/DL (ref 0.6–1.3)
FOLATE SERPL-MCNC: 4.4 NG/ML (ref 3.1–17.5)
GLUCOSE SERPL-MCNC: 122 MG/DL (ref 74–99)
POTASSIUM SERPL-SCNC: 3.3 MMOL/L (ref 3.5–5.5)
RHEUMATOID FACT SERPL-ACNC: 17.7 IU/ML (ref 0–13.9)
SODIUM SERPL-SCNC: 132 MMOL/L (ref 136–145)
TSH SERPL DL<=0.05 MIU/L-ACNC: 0.68 UIU/ML (ref 0.36–3.74)
VIT B12 SERPL-MCNC: 527 PG/ML (ref 211–911)

## 2019-01-29 PROCEDURE — 36415 COLL VENOUS BLD VENIPUNCTURE: CPT

## 2019-01-29 PROCEDURE — 82607 VITAMIN B-12: CPT

## 2019-01-29 PROCEDURE — 84443 ASSAY THYROID STIM HORMONE: CPT

## 2019-01-29 PROCEDURE — 65270000029 HC RM PRIVATE

## 2019-01-29 PROCEDURE — 74011250637 HC RX REV CODE- 250/637: Performed by: INTERNAL MEDICINE

## 2019-01-29 PROCEDURE — 74011250636 HC RX REV CODE- 250/636: Performed by: INTERNAL MEDICINE

## 2019-01-29 PROCEDURE — 74011636637 HC RX REV CODE- 636/637: Performed by: INTERNAL MEDICINE

## 2019-01-29 PROCEDURE — 97530 THERAPEUTIC ACTIVITIES: CPT

## 2019-01-29 PROCEDURE — 80048 BASIC METABOLIC PNL TOTAL CA: CPT

## 2019-01-29 PROCEDURE — 97162 PT EVAL MOD COMPLEX 30 MIN: CPT

## 2019-01-29 RX ORDER — POTASSIUM CHLORIDE 1.5 G/1.77G
40 POWDER, FOR SOLUTION ORAL
Status: COMPLETED | OUTPATIENT
Start: 2019-01-29 | End: 2019-01-29

## 2019-01-29 RX ADMIN — HEPARIN SODIUM 5000 UNITS: 5000 INJECTION INTRAVENOUS; SUBCUTANEOUS at 22:18

## 2019-01-29 RX ADMIN — ATORVASTATIN CALCIUM 20 MG: 20 TABLET, FILM COATED ORAL at 21:38

## 2019-01-29 RX ADMIN — HEPARIN SODIUM 5000 UNITS: 5000 INJECTION INTRAVENOUS; SUBCUTANEOUS at 15:46

## 2019-01-29 RX ADMIN — HEPARIN SODIUM 5000 UNITS: 5000 INJECTION INTRAVENOUS; SUBCUTANEOUS at 09:18

## 2019-01-29 RX ADMIN — PREDNISONE 20 MG: 20 TABLET ORAL at 09:19

## 2019-01-29 RX ADMIN — ASPIRIN 81 MG 81 MG: 81 TABLET ORAL at 09:19

## 2019-01-29 RX ADMIN — METOPROLOL SUCCINATE 100 MG: 50 TABLET, EXTENDED RELEASE ORAL at 09:18

## 2019-01-29 RX ADMIN — POTASSIUM CHLORIDE 40 MEQ: 1.5 POWDER, FOR SOLUTION ORAL at 15:44

## 2019-01-29 RX ADMIN — AMLODIPINE BESYLATE 10 MG: 5 TABLET ORAL at 09:28

## 2019-01-29 RX ADMIN — PANTOPRAZOLE SODIUM 40 MG: 40 TABLET, DELAYED RELEASE ORAL at 09:00

## 2019-01-29 NOTE — PROGRESS NOTES
Chart reviewed. Spoke with pt's son,  Leida Du, in [facility], made him aware that pt will need to go toS for rehab, he is agreeable, made him aware that Dr. Demetra Soliz likes his patients to go to SNF where he goes, he is agreeable. Posted in e-discharge. May Monroy RN,ext [phone].

## 2019-01-29 NOTE — PROGRESS NOTES
Problem: Pressure Injury - Risk of 
Goal: *Prevention of pressure injury Document Josiah Scale and appropriate interventions in the flowsheet. Outcome: Progressing Towards Goal 
Pressure Injury Interventions: 
Sensory Interventions: Assess changes in LOC Moisture Interventions: Absorbent underpads Activity Interventions: Pressure redistribution bed/mattress(bed type) Mobility Interventions: HOB 30 degrees or less Nutrition Interventions: Document food/fluid/supplement intake Friction and Shear Interventions: HOB 30 degrees or less

## 2019-01-29 NOTE — PROGRESS NOTES
Assumed care of patient , patient in bed resting . Call light in reach. Bed in lowest position. Patient continue to be a fall risk, bed alarm on patient. 2330: Patient requesting food. 1678: Bedside / verbal shift change report given to May Tobias   (oncoming nurse) by Anamaria Henderson RN (offgoing nurse). Report included the following information SBAR, Kardex, Intake/Output, MAR and Recent Results.

## 2019-01-29 NOTE — PROGRESS NOTES
General Internal Medicine/Geriatrics Patient: Darian Interiano MRN: [id number]  CSN: [id number] YOB: 1925  Age: 80 y.o. Sex: female DOA: 1/27/2019 LOS:  LOS: 2 days Subjective:  
 
 
Admitted with altered MS 
Rt. Hand arthritis Significant recent decline Essentially bed bound Has helper\"s\" at home but can't remember name. No family in area Son in [place] Review of Systems: 
Eyes: negative Ears, Nose, Mouth, Throat, and Face: negative Respiratory: negative for dyspnea on exertion Cardiovascular: negative for chest pain Gastrointestinal: negative for nausea, vomiting and diarrhea Genitourinary:negative for dysuria and hematuria Integument/Breast: negative Hematologic/Lymphatic: negative for bleeding Musculoskeletal:negative for arthralgias Neurological: negative for weakness Behavioral/Psychiatric: negative for behavior problems Endocrine: negative for temperature intolerance Allergic/Immunologic: negative for hay fever Objective:  
 
Physical Exam: 
Patient Vitals for the past 24 hrs: 
 Temp Pulse Resp BP SpO2  
01/28/19 2224 97.5 °F (36.4 °C) 92 16 104/68 98 % 01/28/19 1342 97.7 °F (36.5 °C) 88 15 133/86 99 % 01/28/19 0943 97.5 °F (36.4 °C) (!) 102 16 139/90 100 % 01/28/19 0400 95.7 °F (35.4 °C) 87 14 (!) 146/96 98 % AF, 
VSS HEENT: wnl NECK:  No venous distention or adenopathy HEART: RRR, no rubs or gallops LUNGS: Clear to auscultation ABDOMEN: soft with active BS. No tenderness, no distention, no organomegaly EXT: Rt. Hand MCP and PIP arthritis SKIN: Normal turgor, no breaks NEURO: Awake, alert, non focal 
OX2 Intake and Output: 
Current Shift:  No intake/output data recorded. Last three shifts:  01/27 0701 - 01/28 1900 In: 2775.8 [P.O.:340; I.V.:2435.8] Out: - Recent Results (from the past 24 hour(s)) METABOLIC PANEL, BASIC Collection Time: 01/28/19 10:36 AM  
Result Value Ref Range Sodium 131 (L) 136 - 145 mmol/L Potassium 3.3 (L) 3.5 - 5.5 mmol/L Chloride 92 (L) 100 - 108 mmol/L  
 CO2 29 21 - 32 mmol/L Anion gap 10 3.0 - 18 mmol/L Glucose 199 (H) 74 - 99 mg/dL BUN 12 7.0 - 18 MG/DL Creatinine 0.67 0.6 - 1.3 MG/DL  
 BUN/Creatinine ratio 18 12 - 20 GFR est AA >60 >60 ml/min/1.73m2 GFR est non-AA >60 >60 ml/min/1.73m2 Calcium 8.8 8.5 - 10.1 MG/DL  
SED RATE (ESR) Collection Time: 01/28/19 10:36 AM  
Result Value Ref Range Sed rate, automated 81 (H) 0 - 30 mm/hr C REACTIVE PROTEIN, QT Collection Time: 01/28/19 10:36 AM  
Result Value Ref Range C-Reactive protein 14.4 (H) 0 - 0.3 mg/dL No results found. Current Facility-Administered Medications Medication Dose Route Frequency  sodium chloride (NS) flush 5-10 mL  5-10 mL IntraVENous PRN  
 amLODIPine (NORVASC) tablet 10 mg  10 mg Oral DAILY  aspirin chewable tablet 81 mg  81 mg Oral DAILY  pantoprazole (PROTONIX) tablet 40 mg  40 mg Oral DAILY  dextrose 5% with KCl 20 mEq/L infusion   IntraVENous CONTINUOUS  
 acetaminophen (TYLENOL) tablet 650 mg  650 mg Oral Q4H PRN  
 ondansetron (ZOFRAN) injection 2 mg  2 mg IntraVENous Q4H PRN  
 alum-mag hydroxide-simeth (MYLANTA) oral suspension 30 mL  30 mL Oral Q4H PRN  
 LORazepam (ATIVAN) tablet 0.5 mg  0.5 mg Oral Q6H PRN  
 heparin (porcine) injection 5,000 Units  5,000 Units SubCUTAneous Q8H  
 atorvastatin (LIPITOR) tablet 20 mg  20 mg Oral QHS  metoprolol succinate (TOPROL-XL) XL tablet 100 mg  100 mg Oral DAILY  hydrALAZINE (APRESOLINE) 20 mg/mL injection 20 mg  20 mg IntraVENous Q6H PRN  predniSONE (DELTASONE) tablet 20 mg  20 mg Oral DAILY WITH BREAKFAST Lab Results Component Value Date/Time Glucose 199 (H) 01/28/2019 10:36 AM  
 Glucose 101 (H) 01/27/2019 09:30 AM  
 Glucose 129 (H) 12/15/2018 08:46 PM  
 Glucose 90 09/20/2018 03:00 PM  
 Glucose 90 08/26/2018 01:40 PM  
  
 
Assessment Active Problems: 
  Altered mental status (3/2/2015) Hand arthritis (1/29/2019) MCI (mild cognitive impairment) (1/29/2019) Hypokalemia (1/29/2019) HTN (hypertension) (1/29/2019) CAD (coronary artery disease) (1/29/2019) PMR (polymyalgia rheumatica) (HCC) (1/29/2019) Plan Xray with Chondrocalcinosis R/O RA Sed rate and CRP up Prednisone IV fluids, replace K 
TSH, B12 etc.. Brain imaging if not done PT/OT 
? SNF May need LTC Cyndi Jeffers MD 
1/29/2019, 12:06 AM

## 2019-01-29 NOTE — PROGRESS NOTES
Therapy Screening: 
Services are indicated. Evaluate and Treat Order is requested. An InBasket screening referral was triggered for PT/OT based on results obtained during the nursing admission assessment. The patients chart was reviewed and the patient is appropriate for a skilled therapy evaluation. Please order a consult for PT/OT if you are in agreement and would like an evaluation to be completed. Thank you.  
 
Roseann Cee, SLP

## 2019-01-29 NOTE — PROGRESS NOTES
General Internal Medicine/Geriatrics Patient: Lorenzo Giles MRN: [id number]  CSN: [id number] YOB: 1925  Age: 80 y.o. Sex: female DOA: 1/27/2019 LOS:  LOS: 2 days Subjective: More alert More coherent Hand better 
weak Review of Systems: 
Eyes: negative Ears, Nose, Mouth, Throat, and Face: negative Respiratory: negative for dyspnea on exertion Cardiovascular: negative for chest pain Gastrointestinal: negative for nausea, vomiting and diarrhea Genitourinary:negative for dysuria and hematuria Integument/Breast: negative Hematologic/Lymphatic: negative for bleeding Musculoskeletal:negative for arthralgias Neurological: negative for weakness Behavioral/Psychiatric: negative for behavior problems Endocrine: negative for temperature intolerance Allergic/Immunologic: negative for hay fever Objective:  
 
Physical Exam: 
Patient Vitals for the past 24 hrs: 
 Temp Pulse Resp BP SpO2  
01/29/19 1329 98.1 °F (36.7 °C) 82 16 121/67 97 % 01/29/19 0607 97.6 °F (36.4 °C) 85 16 115/65 97 % 01/29/19 0205 97.8 °F (36.6 °C) 86 16 103/69 98 % 01/28/19 2224 97.5 °F (36.4 °C) 92 16 104/68 98 % 01/28/19 2201     96 % AF, 
VSS HEENT: wnl NECK:  No venous distention or adenopathy HEART: RRR, no rubs or gallops LUNGS: Clear to auscultation ABDOMEN: soft with active BS. No tenderness, no distention, no organomegaly EXT: Rt. Hand MCP and PIP arthritis much better SKIN: Normal turgor, no breaks NEURO: Awake, alert, non focal 
OX2 Intake and Output: 
Current Shift:  01/29 0701 - 01/29 1900 In: -  
Out: 543 [FYNTY:226] Last three shifts:  01/27 1901 - 01/29 0700 In: 805.8 [P.O.:240; I.V.:565.8] Out: - Recent Results (from the past 24 hour(s)) TSH 3RD GENERATION Collection Time: 01/29/19  1:01 AM  
Result Value Ref Range TSH 0.68 0.36 - 3.74 uIU/mL VITAMIN B12 & FOLATE  Collection Time: 01/29/19  1:01 AM  
 Result Value Ref Range Vitamin B12 527 211 - 911 pg/mL Folate 4.4 3.10 - 17.50 ng/mL METABOLIC PANEL, BASIC Collection Time: 01/29/19  1:01 AM  
Result Value Ref Range Sodium 132 (L) 136 - 145 mmol/L Potassium 3.3 (L) 3.5 - 5.5 mmol/L Chloride 91 (L) 100 - 108 mmol/L  
 CO2 30 21 - 32 mmol/L Anion gap 11 3.0 - 18 mmol/L Glucose 122 (H) 74 - 99 mg/dL BUN 16 7.0 - 18 MG/DL Creatinine 0.66 0.6 - 1.3 MG/DL  
 BUN/Creatinine ratio 24 (H) 12 - 20 GFR est AA >60 >60 ml/min/1.73m2 GFR est non-AA >60 >60 ml/min/1.73m2 Calcium 8.8 8.5 - 10.1 MG/DL No results found. Current Facility-Administered Medications Medication Dose Route Frequency  potassium chloride (KAON 10%) 20 mEq/15 mL oral liquid 40 mEq  40 mEq Oral NOW  sodium chloride (NS) flush 5-10 mL  5-10 mL IntraVENous PRN  
 amLODIPine (NORVASC) tablet 10 mg  10 mg Oral DAILY  aspirin chewable tablet 81 mg  81 mg Oral DAILY  pantoprazole (PROTONIX) tablet 40 mg  40 mg Oral DAILY  acetaminophen (TYLENOL) tablet 650 mg  650 mg Oral Q4H PRN  
 ondansetron (ZOFRAN) injection 2 mg  2 mg IntraVENous Q4H PRN  
 alum-mag hydroxide-simeth (MYLANTA) oral suspension 30 mL  30 mL Oral Q4H PRN  
 LORazepam (ATIVAN) tablet 0.5 mg  0.5 mg Oral Q6H PRN  
 heparin (porcine) injection 5,000 Units  5,000 Units SubCUTAneous Q8H  
 atorvastatin (LIPITOR) tablet 20 mg  20 mg Oral QHS  metoprolol succinate (TOPROL-XL) XL tablet 100 mg  100 mg Oral DAILY  hydrALAZINE (APRESOLINE) 20 mg/mL injection 20 mg  20 mg IntraVENous Q6H PRN  predniSONE (DELTASONE) tablet 20 mg  20 mg Oral DAILY WITH BREAKFAST Lab Results Component Value Date/Time Glucose 122 (H) 01/29/2019 01:01 AM  
 Glucose 199 (H) 01/28/2019 10:36 AM  
 Glucose 101 (H) 01/27/2019 09:30 AM  
 Glucose 129 (H) 12/15/2018 08:46 PM  
 Glucose 90 09/20/2018 03:00 PM  
  
 
Assessment Active Problems: 
  Altered mental status (3/2/2015) Hand arthritis (1/29/2019) MCI (mild cognitive impairment) (1/29/2019) Hypokalemia (1/29/2019) HTN (hypertension) (1/29/2019) CAD (coronary artery disease) (1/29/2019) PMR (polymyalgia rheumatica) (HCC) (1/29/2019) Plan Xray with Chondrocalcinosis RF high, CCp pending Sed rate and CRP up Prednisone DC IV fluids, replace K 
TSH, B12 OK Brain imaging OK 
PT/OT 
? SNF May need LTC Russ Stroud MD 
1/29/2019,

## 2019-01-29 NOTE — ROUTINE PROCESS
Bedside and Verbal shift change report given to [place], RN (oncoming nurse) by Kishor Savage RN, BSN (offgoing nurse). Report given with SBAR, Kardex, Intake/Output, MAR and Recent Results.

## 2019-01-29 NOTE — PROGRESS NOTES
Problem: Mobility Impaired (Adult and Pediatric) Goal: *Acute Goals and Plan of Care (Insert Text) Physical Therapy Goals Initiated 1/29/2019 and to be accomplished within 7 days. 1.  Patient will complete all bed mobility with supervision/set-up in order to prepare for EOB/OOB activity. 2.  Patient will perform sit <> stand with minimal assistance/contact guard assist in order to prepare for OOB/gait activity. 3.  Patient will perform bed to chair transfers with minimal assistance/contact guard assist in order to promote mobility and encourage seated activity to progress towards their prior level of function. 4.  Patient will ambulate 25 feet with minimal assistance/contact guard assist using LRAD in order to prepare for safe negotiation of their environment. Outcome: Progressing Towards Goal 
PHYSICAL THERAPY: Initial Assessment INPATIENT: Medicare: Hospital Day: 3 Patient: Erika Barrett (63 y.o. female)    Date: 1/29/2019 Primary Diagnosis: Cellulitis of right hand Altered mental status Delirium  
,    
Precautions:   
 
PLOF: Unknown ASSESSMENT : 
Patient supine in bed, oriented to self only and confused. Unable to collect social history as patient is a poor historian. Requesting to use bathroom. MIN a x 1 for supine > sit. Patient demo's good seated balance. MIN a x 1 for stand pivot transfers to [facility]; patient requires verbal cuing for safety. Decreased safety awareness with activity; attempting to slide forwards on the [facility]. Patient unable to make bowel movement. MIN A x 1 to assist back to bed from [facility]. Fair standing balance with transfers. MOD A x 1 to return to bed. Left supine in bed with all needs within reach. Bed alarm activated. No c/o pain with activity. RN notified. Recommend d/c to SNF Patient presents with deficits in: 
Bed Mobility, Transfers, Gait and Strength Patient will benefit from skilled intervention to address the above impairments. Patients rehabilitation potential is considered to be Fair Factors which may influence rehabilitation potential include:  
[]         None noted 
[x]         Mental ability/status [x]         Medical condition 
[x]         Home/family situation and support systems 
[x]         Safety awareness 
[]         Pain tolerance/management 
[]         Other: PLAN : 
Recommendations and Planned Interventions: 
[x]           Bed Mobility Training             [x]    Neuromuscular Re-Education 
[x]           Transfer Training                   []    Orthotic/Prosthetic Training 
[x]           Gait Training                          []    Modalities [x]           Therapeutic Exercises          []    Edema Management/Control 
[x]           Therapeutic Activities            [x]    Patient and Family Training/Education 
[]           Other (comment): EDUCATION:  
Education:  Patient was educated on the following topics: Purpose of PT, PT POC, bed mobility, transfers. Safety with mobility. Barriers to Learning/Limitations: yes;  altered mental status (i.e.Sedation, Confusion) Compensate with: visual, verbal, tactile, kinesthetic cues/model Recommendations for the next treatment session: Gait with RW Frequency/Duration: Patient will be followed by physical therapy 3 - 5  times a week to address goals. Discharge Recommendations: [facility] Further Equipment Recommendations for Discharge: rolling walker Factors which may impact discharge planning: N/A  
 
SUBJECTIVE:  
Patient stated I'm constipated you know.  OBJECTIVE DATA SUMMARY:  
 
Past Medical History:  
Diagnosis Date  Arthritis  CAD (coronary artery disease) 2001  
 3 coronary stents  Cardiac complication  Hypertension  Migraine 3/1/2015  Polymyalgia rheumatica ([facility] [address] 2/28/2015 Past Surgical History:  
Procedure Laterality Date  CARDIAC SURG PROCEDURE UNLIST    
 3 stents Eval Complexity: History: MEDIUM  Complexity : 1-2 comorbidities / personal factors will impact the outcome/ POC Exam:MEDIUM Complexity : 3 Standardized tests and measures addressing body structure, function, activity limitation and / or participation in recreation  Presentation: MEDIUM Complexity : Evolving with changing characteristics  Clinical Decision Making:Medium Complexity MIN A x 1 for bed mobility, transfers Overall Complexity:MEDIUM Prior Level of Function/Home Situation:  
Home Situation Home Environment: Private residence One/Two Story Residence: One story Living Alone: Yes Support Systems: Family member(s), Friends \ neighbors Patient Expects to be Discharged to[de-identified] Private residence Current DME Used/Available at Home: WalkerCritical Behavior: 
Neurologic State: Alert;Confused Orientation Level: Oriented to person Cognition: Follows commands;Decreased attention/concentration Psychosocial 
Patient Behaviors: Calm;Confused Purposeful Interaction: Yes Tone : NormalSensation: NT 
Range Of Motion: B LE [facility]OM [facility] Functional Mobility: 
 
 
Functional Status Indep (I) Mod I Super-vision Min A Mod A Max A Total A Assist x2 Verbal cues Additional time Not tested Comments Rolling []  []  [] []    []    []  []  [] [] [] [x] Supine to sit []  []  [] [x]  []  []  []  [] [] [] [] Sit to supine []  []  [] [x]  []  []  []  [] [] [] [] Sit to stand []  []  [] [x]  []  []  []  [] [] [] []   
Stand to sit []  []  [] [x]  []  []  []  [] [] [] [] Bed to chair transfers []  []  [] [x]  []  []  []  [] [] [] [] Balance Good Judythe Lars Poor Unable Not tested Comments Sitting static [x]  []  []  []  [] Sitting dynamic [x]  []  []  []  []   
Standing static []  [x]  []  []  []   
Standing dynamic []  [x]  []  []  []   
 
Pain: 
None Vital Signs Temp: 98.1 °F (36.7 °C) Pulse (Heart Rate): 82    
BP: 121/67 Resp Rate: 16    
O2 Sat (%): 97 % Activity Tolerance:  
Fair Please refer to the flowsheet for vital signs taken during this treatment. After treatment:  
[]         Patient left in no apparent distress sitting up in chair 
[x]         Patient left in no apparent distress in bed 
[x]         Call bell left within reach [x]         Nursing notified 
[]         Caregiver present 
[]         Bed alarm activated COMMUNICATION/EDUCATION:  
[x]         Fall prevention education was provided and the patient/caregiver indicated understanding. []         Patient/family have participated as able in goal setting and plan of care. []         Patient/family agree to work toward stated goals and plan of care. []         Patient understands intent and goals of therapy, but is neutral about his/her participation. []         Patient is unable to participate in goal setting and plan of care. Recommendations for nursing: 
Written on communication board: up with assist x 1 Thank you for this referral. 
Etienne Coker 25 Minutes

## 2019-01-29 NOTE — PROGRESS NOTES
2626  Assume care of patient from [place], RN pt awake in bed, A&Ox1  ,no distress noted and denies pain. Frequently used items and call bell within reach. Pt verbalized understanding to use call bell for any needs or assistance. Bed lock in lowest position.

## 2019-01-29 NOTE — PROGRESS NOTES
In Patient's chart assisting with obtaining information concerning Flu vaccine. 3446- Patient awake was asked if she wanted a Flu Vaccine and refused said \"no thank you. \" This nurse asked Patient if she understood the question and she repeated \"you asked me if I wanted the flu shot. \" 
 
[id number]- Patient awake. In chart to obtain information for Admission required Documentation for History Database. 2843- Patient doesn't remember home medications, gave permission for this nurse to call Rohini Verma (Caregiver), who was called and gave Patient identifiers prior to conversation said that she would be to the hospital later this evening to assist with filling out Med Rec. [id number] (Caregiver) called and informed this nurse that she had thought about what I had asked her concerning Patient's home medications and that the Patient has been handling her own medications.

## 2019-01-29 NOTE — CDMP QUERY
The medical record reflects the following: 
 
Risk: 81 yo  female admitted cellulitis of right arm Clinical Indicators:- per Dietician note 1/28 pt status is classified as underweight per BMI of 16.5 
-meets criteria for acute severe malnutrition 
-moderate muscle wasting 
-weight loss >1-2 % 1 week, >5% in 1 week, >7.5% in 3 months Treatment: . FL Diet; advance when medically indicated and per Pt tolerance 2. Ensure Enlive TID (strawberry) 3. Monitor labs, weight and PO intake 4. RD to follow Please clarify if this patient is being treated/managed for: 
 
=>acute severe protein calorie malnutrition and underweight 
=>Other Explanation of clinical findings =>Unable to Determine (no explanation of clinical findings) Please clarify and document your clinical opinion in the progress notes and discharge summary including the definitive and/or presumptive diagnosis, (suspected or probable), related to the above clinical findings. Please include clinical findings supporting your diagnosis. If you DECLINE this query or would like to communicate with [facility], please utilize the \"[facility] message box\" at the TOP of the Progress Note on the right. Thank you, 
Zak Sierra RN [facility]   [phone]

## 2019-01-30 VITALS
OXYGEN SATURATION: 95 % | HEIGHT: 64 IN | HEART RATE: 80 BPM | RESPIRATION RATE: 16 BRPM | DIASTOLIC BLOOD PRESSURE: 60 MMHG | SYSTOLIC BLOOD PRESSURE: 100 MMHG | BODY MASS INDEX: 16.39 KG/M2 | TEMPERATURE: 97.4 F | WEIGHT: 96 LBS

## 2019-01-30 PROBLEM — E46 MALNUTRITION (HCC): Status: ACTIVE | Noted: 2019-01-30

## 2019-01-30 LAB
ANION GAP SERPL CALC-SCNC: 10 MMOL/L (ref 3–18)
BUN SERPL-MCNC: 22 MG/DL (ref 7–18)
BUN/CREAT SERPL: 31 (ref 12–20)
CALCIUM SERPL-MCNC: 8.8 MG/DL (ref 8.5–10.1)
CHLORIDE SERPL-SCNC: 96 MMOL/L (ref 100–108)
CO2 SERPL-SCNC: 29 MMOL/L (ref 21–32)
CREAT SERPL-MCNC: 0.72 MG/DL (ref 0.6–1.3)
GLUCOSE SERPL-MCNC: 83 MG/DL (ref 74–99)
POTASSIUM SERPL-SCNC: 3.8 MMOL/L (ref 3.5–5.5)
SODIUM SERPL-SCNC: 135 MMOL/L (ref 136–145)

## 2019-01-30 PROCEDURE — 74011250636 HC RX REV CODE- 250/636: Performed by: INTERNAL MEDICINE

## 2019-01-30 PROCEDURE — 36415 COLL VENOUS BLD VENIPUNCTURE: CPT

## 2019-01-30 PROCEDURE — 97166 OT EVAL MOD COMPLEX 45 MIN: CPT

## 2019-01-30 PROCEDURE — 74011250637 HC RX REV CODE- 250/637: Performed by: INTERNAL MEDICINE

## 2019-01-30 PROCEDURE — 74011636637 HC RX REV CODE- 636/637: Performed by: INTERNAL MEDICINE

## 2019-01-30 PROCEDURE — 80048 BASIC METABOLIC PNL TOTAL CA: CPT

## 2019-01-30 PROCEDURE — 97535 SELF CARE MNGMENT TRAINING: CPT

## 2019-01-30 RX ORDER — QUETIAPINE FUMARATE 25 MG/1
25 TABLET, FILM COATED ORAL
Qty: 30 TAB | Refills: 0 | Status: SHIPPED
Start: 2019-01-30

## 2019-01-30 RX ORDER — MEGESTROL ACETATE 40 MG/ML
200 SUSPENSION ORAL DAILY
Qty: 300 ML | Refills: 0 | Status: SHIPPED
Start: 2019-01-31

## 2019-01-30 RX ORDER — AMLODIPINE BESYLATE 10 MG/1
10 TABLET ORAL DAILY
Qty: 30 TAB | Refills: 0 | Status: SHIPPED
Start: 2019-01-31

## 2019-01-30 RX ORDER — PREDNISONE 10 MG/1
10 TABLET ORAL
Status: DISCONTINUED | OUTPATIENT
Start: 2019-01-31 | End: 2019-01-30 | Stop reason: HOSPADM

## 2019-01-30 RX ORDER — METOPROLOL SUCCINATE 100 MG/1
100 TABLET, EXTENDED RELEASE ORAL DAILY
Qty: 30 TAB | Refills: 0 | Status: SHIPPED
Start: 2019-01-31

## 2019-01-30 RX ORDER — PREDNISONE 10 MG/1
10 TABLET ORAL
Qty: 30 TAB | Refills: 0 | Status: SHIPPED | OUTPATIENT
Start: 2019-01-31

## 2019-01-30 RX ORDER — ATORVASTATIN CALCIUM 20 MG/1
20 TABLET, FILM COATED ORAL
Qty: 30 TAB | Refills: 0 | Status: SHIPPED
Start: 2019-01-30

## 2019-01-30 RX ORDER — MEGESTROL ACETATE 40 MG/ML
200 SUSPENSION ORAL DAILY
Status: DISCONTINUED | OUTPATIENT
Start: 2019-01-31 | End: 2019-01-30 | Stop reason: HOSPADM

## 2019-01-30 RX ORDER — QUETIAPINE FUMARATE 25 MG/1
25 TABLET, FILM COATED ORAL
Status: DISCONTINUED | OUTPATIENT
Start: 2019-01-30 | End: 2019-01-30 | Stop reason: HOSPADM

## 2019-01-30 RX ADMIN — HEPARIN SODIUM 5000 UNITS: 5000 INJECTION INTRAVENOUS; SUBCUTANEOUS at 15:16

## 2019-01-30 RX ADMIN — HEPARIN SODIUM 5000 UNITS: 5000 INJECTION INTRAVENOUS; SUBCUTANEOUS at 06:18

## 2019-01-30 NOTE — PROGRESS NOTES
Transportation at Discharge: 1/30/19 Transport Company/Representative: Catia Guerrier / [employer] Phone number: [phone] Method of Transport:  [employer] / Valeria Everett Estimated pick-up time: 4:00P Destination:  [address] [place] [address] Insurance Info: Medicare / Michael Huff [facility] Authorization: No auth required Requesting Outcomes Manager:  Girish Foley [place], Care- ext [phone]

## 2019-01-30 NOTE — PROGRESS NOTES
2005  Bedside report given by Binh Anderson RN. Pt in bed awake alert to self,  confused and [place]. Pt denies pain . Assesment completed , dual skin assesment done, mepilex applied to sacral area for prevention. Bed alarm on, bed low and in locked position. Will continue with the care Pt asked for Kelsey the care giver, this nurse called Eduar Miller who spoke to pt but said she will be coming to see her in am.. 0032- Bed alarm on, pt getting out of bed without help. Pt brought on a bed to the nurses station for safety. 0300- Pt assisted to bathroom voided, given a bath and then back to bed at the nurses station. 0600- Pt awake at the nurses station doing some activity - book coloring. No concerns voiced. 0730- Bedside and Verbal shift change report given to Vernell Matt RN (oncoming nurse) by Raj Guerrero RN (offgoing nurse). Report included the following information SBAR, Kardex, Intake/Output, MAR and Recent Results. Pt still in bed at the nurses station for safety.

## 2019-01-30 NOTE — PROGRESS NOTES
Problem: Self Care Deficits Care Plan (Adult) Goal: *Acute Goals and Plan of Care (Insert Text) Occupational Therapy Goals Initiated 1/30/2019 within 7 day(s). 1.  Patient will perform grooming tasks while standing with minimal assistance. 2.  Patient will perform lower body dressing with minimal assistance utilizing adaptive strategies, prn. 
3.  Patient will perform functional task in standing for 8 minutes with supervision for safety and < 3 rest breaks to increase activity tolerance for ADLs. 4.  Patient will perform toilet transfers with stand by assistance and minimal verbal cues. 5.  Patient will perform all aspects of toileting with supervision/stand by assistance. 6.  Patient will participate in upper extremity therapeutic exercise/activities with supervision/set-up for 8 minutes to increase BUE strength for functional transfers and ADLs. 7.  Patient will utilize energy conservation techniques during functional activities with minimal verbal cues. Outcome: [address] Occupational THERAPY: Initial Assessment INPATIENT: Medicare: Hospital Day: 4 Patient: Lorenzo Giles (45 y.o. female)    Date: 1/30/2019 Primary Diagnosis: Cellulitis of right hand Altered mental status Delirium Precautions: Falls PLOF: Pt required assistance with ADLs & functional mobility PTA. ASSESSMENT:  
Based on the objective data described below, the patient presents with impairments with regard to bed mobility, activity tolerance, BUE strength and independence in functional transfers and ADLs. Pt supine on arrival, wakes to voice, A&O x1. Follows simple commands. Min/mod A for supine-->sit secondary to decreased attention & command following. Fair sitting balance during oral care & facial hygiene (see functional levels below). Min/mod A to stand & side step towards [facility]; fair-/poor standing balance. Pt returned supine, bed alarm activated, needs within reach. Recommend SNF upon d/c. Recommendations for the next treatment session: ADLs at [facility] Ms. Myers will benefit from skilled intervention to address the above impairments. Her rehabilitation potential is considered to be Good. EDUCATION Education:  Patient was educated on the following topics: role of OT/POC Barriers to Learning/Limitations: yes;  cognitive Compensate with: visual, verbal, tactile, kinesthetic cues/model PLAN OF CARE:  
Problems:  Decreased ROM, Decreased strength affecting function, Decreased ADL/functioning of activities, Decreased transfer abilities and Decreased activity tolerance Recommendations and Planned Interventions: 
[x]                  Self Care Training                   [x]         Therapeutic Activities [x]                  Functional Mobility Training    []          Cognitive Retraining 
[x]                  Therapeutic Exercises            [x]          Endurance Activities [x]                  Balance Training                     []          Neuromuscular Re-ed []                  Visual/Perceptual Training      [x]       Home Safety Training 
[x]                  Patient Education                    [x]          Family Training/Education []                  Other (comment): Frequency/Duration: Patient will be followed by occupational therapy 3-5 times a week to address goals. Discharge Recommendations: SNF Further Equipment Recommendations for Discharge: bedside commode SUBJECTIVE:  
Patient stated: \"I'd like to sit up. \" OBJECTIVE/TREATMENT:  
 
Past Medical History:  
Diagnosis Date  Arthritis  CAD (coronary artery disease) 2001  
 3 coronary stents  Cardiac complication  Hypertension  Migraine 3/1/2015  Polymyalgia rheumatica ([facility] [address] 2/28/2015 Past Surgical History:  
Procedure Laterality Date  CARDIAC SURG PROCEDURE UNLIST    
 3 stents Eval Complexity: History: MEDIUM Complexity : Expanded review of history including physical, cognitive and psychosocial  history ; Examination: MEDIUM Complexity : 3-5 performance deficits relating to physical, cognitive , or psychosocial skils that result in activity limitations and / or participation restrictions; Decision Making:MEDIUM Complexity : Patient may present with comorbidities that affect occupational performnce. Miniml to moderate modification of tasks or assistance (eg, physical or verbal ) with assesment(s) is necessary to enable patient to complete evaluation Prior Level of Function/Home Situation: Ambulatory and capable of self-care but unable to carry out any work activities. Up and about more than 50% of waking hours. Lives alone Lives with Caregivers Agustíneda Yanick Cognitive/Behavioral Status:  
Neurologic State: alert Orientation: only aware of  person Cognition:   appropriate decision making and following commands  follows multi-step simple commands/direction Safety/Judgement: Decreased awareness of need for safety ROM: Minimally limited (BUEs approx 3/4 shoulder flex) MMT: 3/5 (BUEs) Coordination: BUEs (generally decreased) Hand dominance :Right Skin: Intact (BUEs) Edema: none noted (BUEs) Sensation: unable to formally assess secondary to confusion Vision/Perceptual: Unable to formally assess secondary to confusion Functional Status Indep Mod I  
Sup. / 
Set- Up SBA CGA Min Assist  
Mod Assist  
Max assist  
Total Assist  
Assist x2 Additional Time NT Comments Rolling []  []  []  []  []    [x]    []    []  []  []  []  [] Supine to sit []  []  []  []  []  [x]  [x]  []  []  []  []  [] Sit to supine []  []  []  []  []  []  [x]  []  []  []  []  []  BLE management Sit to stand []  []  []  []  []  [x]  [x]  []  []  []  []  [] Toilet Transfer []  []  []  []  []  []  []  []  []  []  []  [x] Feeding []  []  []  [x]  []  []  []  []  []  []  []  []    
Grooming []  []  []  []  []  [x]  []  []  []  []  []  [] Bathing  []  []  []  []  []  []  [x]  []  []  []  []  []    
UB Dressing  []  []  []  []  []  []  [x]  []  []  []  []  []    
LB Dressing  []  []  []  []  []  []  [x]  []  []  []  []  [] Toileting []  []  []  []  []  []  [x]  []  []  []  []  [] Balance Good Willadean Ham Poor Unable Comments Sitting static []  [x]  []  [] Sitting dynamic []  [x]  []  []    
Standing static []  [x]  []  []    
Standing dynamic []  []  [x]  [] ADL Intervention:  
Min/mod A for oral care task with fair sitting balance and min/mod vc's for attention to task. Supervision/set-up for facial hygiene. Mod A for balance at times as pt attempted to return supine without notifying therapist. 
 
Pain:  
Pre treatment: 0/10 Post treatment: 0/10 Scale: numeric Activity tolerance:  fair COMMUNICATION/EDUCATION: Pt educated on role of OT and POC; needs reinforcement. [x]         Fall prevention education was provided and the patient/caregiver indicated understanding. [x]         Patient/family have participated as able in goal setting and plan of care. [x]         Patient/family agree to work toward stated goals and plan of care. []         Patient understands intent and goals of therapy, but is neutral about his/her participation The patients plan of care was also discussed with: Physical Therapist, Certified Occupational Therapy Assistant and Registered Nurse. · After treatment position/precautions:  
o Supine in bed 
o Bed alarm/tab alert on 
o Call light within reach 
o RN notified Recommendations for nursing: up with assist x1 Thank you for this referral. 
Alvaro Stein MS OTR/L Time Calculation: 18 mins

## 2019-01-30 NOTE — PROGRESS NOTES
Patient received up at [facility] station, then return to room. Alert to self; reoriented to place; time and situation. Denies pain; behavior indicators negative. No distress noted. Frequently use items within reach. Bed locked in low position. Call bell within reach. Bed pad alarm remains in place. 1100- Patient has full liquid diet since admit and Code status need addressing. Dr. Silva Marie was called; awaiting call back. 65- Dr. Silva Marie return call made aware of current diet and Code status needed said that he would address code and T.O. received for Mechanical soft diet (RBV). [phone] Dr. Silva Marie to [facility] station made aware that Patient having poor appetite and refused am med's; voiced understanding. 1935- Pt discharge to [facility] accompanied by Medical transporters x2. No distress noted. Discharge instructions and belongings sent with Patient. (Patient due  time was for 16:00pm. This nurse had called Medical transporter twice after due  time).

## 2019-01-30 NOTE — DISCHARGE SUMMARY
[address]    Obed Claude  MR#: [id number]  : 1925  ACCOUNT #: [de-identified]   ADMIT DATE: 2019  DISCHARGE DATE: 2019    DISCHARGE DIAGNOSES:  1. Altered mental status/mild encephalopathy -- improved. 2.  Acute arthritis, right carpometacarpal and interphalangeal joints of the right hand, with x-ray showing chondrocalcinosis and slightly elevated rheumatoid factor. 3.  Malnutrition with albumin level 2.6 on admission. 4.  Hypokalemia -- corrected. 5.  Underweight. 6.  Mild cognitive impairment/early dementia. 7.  Polymyalgia rheumatica. 8.  Hypertension. 9.  Hypercholesterolemia. 10.  Coronary artery disease. 11.  Gastroesophageal reflux disease. 12.  Osteoarthritis. 13.  Osteoporosis. 14.  History of gout. 15.  Status post coronary artery stenting in the past.    DISCHARGE MEDICATIONS:  1.  Norvasc 10 mg daily. 2.  Aspirin 81 mg daily. 3.  Lipitor 20 mg daily. 4.  Megace 400 mg/10 mL daily x1 month. 5.  Toprol- mg daily. 6.  Protonix 40 mg daily. 7.  Prednisone 10 mg daily x3 days, then 5 mg daily x3 days, then 5 mg every other day for 3 doses, then discontinue. 8.  Seroquel 25 mg every evening. ALLERGIES:  PATIENT HAS NO KNOWN DRUG ALLERGIES. CODE STATUS:  PATIENT IS A FULL CODE. HISTORY OF PRESENT ILLNESS:  This is a 40-year-old white female with a history of the above problems, who presented to the Emergency Department because of severe right hand pain and altered mental status. No fever or chills. No trauma. Patient is known to have gout, osteoarthritis, and polymyalgia rheumatica. Initially, she was thought to have cellulitis, but on careful exam, that was not felt to be the case, and acute inflammatory arthritis was suspected. SOCIAL HISTORY:  She is  and she is a former smoker. Does not drink any alcohol. FAMILY HISTORY:  Both parents had heart disease.     The patient has 1 son who lives in [facility]. HOSPITAL COURSE:  The patient was treated with IV fluids, potassium was replaced, she was put back on her usual medication in addition to subcutaneous heparin. She was treated with prednisone for her inflammatory arthritis and that has almost subsided. CCP antibody was negative, rheumatoid factor slightly elevated. Sed rate and CRP both significantly elevated. The rest of her labs were at baseline. She remained stable from the hemodynamic and cardiopulmonary standpoint. Her albumin is low and she has lost a lot of weight, consistent with being underweight and malnourished. Mental status has improved, except last night got agitated. We will start the patient on low-dose Seroquel in the evening. Patient was seen by Physical Therapy, felt to need further rehab. She is felt to have achieved the maximum benefits of acute hospital stay. Patient and her son are agreeable to skilled nursing facility. DISPOSITION:  Discharged to skilled nursing facility. MD TIFFANIE Beaulieu/TIARRA  D: 01/30/2019 13:26     T: 01/30/2019 14:23  JOB #: [id number]

## 2019-01-30 NOTE — ROUTINE PROCESS
[facility] to [address] [place] [address] Louis 
                                                                      80 y.o.   female [facility]   Room: 2105/01    [facility] 2W NEURO MED  Unit Phone# :  [phone] [facility] 2W NEURO MED 
[address] [place] [address] Dept: [phone] Loc: [phone] SITUATION Admitted:  1/27/2019         Attending Provider:  Wallace Bettencourt MD    
 
Consultations:  IP CONSULT TO PRIMARY CARE PROVIDER 
IP CONSULT TO PSYCHIATRY 
 
PCP:  Wallace Bettencourt MD   [phone] Treatment Team: Attending Provider: Wallace Bettencourt MD; Utilization Review: Elbert Osorio; Care Manager: Serina Gates; Occupational Therapist: Kyler Wade OT Admitting Dx:  Cellulitis of right hand Altered mental status Delirium Principal Problem: <principal problem not specified> * No surgery found * of  
  
BY: * Surgery not found *             ON: * No surgery found * Code Status: Full Code Advance Directives:  
Advance Care Planning 1/29/2019 Patient's Healthcare Decision Maker is: -  
Primary Decision Maker Name -  
Primary Decision Maker Phone Number -  
Primary Decision Maker Relationship to Patient -  
Confirm Advance Directive Yes, on file Patient Would Like to Complete Advance Directive - Does the patient have other document types - (Send w/patient) Not Received Isolation:  There are currently no Active Isolations       MDRO: No current active infections Pain Medications given:  Tylenol Last dose: no c/o pain Special Equipment needed: no  Type of equipment: 
 
hemodialysis Not currently on dialysis BACKGROUND Allergies: 
No Known Allergies Past Medical History:  
Diagnosis Date  Arthritis  CAD (coronary artery disease) 2001  
 3 coronary stents  Cardiac complication  Hypertension  Migraine 3/1/2015  Polymyalgia rheumatica ([facility] [address] 2/28/2015 Past Surgical History:  
Procedure Laterality Date  CARDIAC SURG PROCEDURE UNLIST    
 3 stents Medications Prior to Admission Medication Sig  
 acetaminophen-codeine (TYLENOL #3) 300-30 mg per tablet Take 1 Tab by mouth every six (6) hours as needed for Pain. Max Daily Amount: 4 Tabs.  cloNIDine HCl (CATAPRES) 0.1 mg tablet Take 0.1 mg by mouth two (2) times a day.  metoprolol tartrate (LOPRESSOR) 100 mg IR tablet Take 100 mg by mouth daily.  ciprofloxacin HCl (CILOXAN) 0.3 % ophthalmic solution Administer 2 Drops to left eye three (3) times daily.  acetaminophen (TYLENOL PO) 300 mg by Buccal route two (2) times a day. Indications: pain  
 guaifenesin/dextromethorphan (GUAIFENESIN DM PO) Take 10 mL by mouth two (2) times daily as needed (cough). makes nauseated not taking  polyethylene glycol (MIRALAX) 17 gram packet Take 1 Packet by mouth nightly.  atenolol (TENORMIN) 50 mg tablet Take 1 Tab by mouth every twelve (12) hours. (Patient not taking: Reported on 9/4/2018)  acetaminophen (TYLENOL) 325 mg tablet Take 2 Tabs by mouth every six (6) hours as needed.  pantoprazole (PROTONIX) 40 mg tablet Take 1 Tab by mouth Daily (before breakfast).  predniSONE (DELTASONE) 10 mg tablet Take 0.5 Tabs by mouth daily.  albuterol-ipratropium (DUO-NEB) 2.5 mg-0.5 mg/3 ml nebulizer solution 3 mL by Nebulization route three (3) times daily as needed for Wheezing.  losartan-hydrochlorothiazide (HYZAAR) 100-25 mg per tablet Take 1 Tab by mouth daily.  simvastatin (ZOCOR) 40 mg tablet Take  by mouth nightly.  aspirin 81 mg tablet Take 81 mg by mouth.  ergocalciferol (VITAMIN D2) 50,000 unit capsule Take 50,000 Units by mouth every seven (7) days. Hard scripts included in transfer packet yes Vaccinations: There is no immunization history for the selected administration types on file for this patient. Readmission Risks:    Known Risks: unknown The Charlson CoMorbitiy Index tool is an evidenced based tool that has more automatic generated information. The tool looks at many different items such as the age of the patient, how many times they were admitted in the last calendar year, current length of stay in the hospital and their diagnosis. All of these items are pulled automatically from information documented in the chart from various places and will generate a score that predicts whether a patient is at low (less than 13), medium (13-20) or high (21 or greater) risk of being readmitted. ASSESSMENT Temp: 97.7 °F (36.5 °C) (01/30/19 1327) Pulse (Heart Rate): 82 (01/30/19 1327) Resp Rate: 18 (01/30/19 1327)           BP: 121/73 (01/30/19 1327) O2 Sat (%): 98 % (01/30/19 1327) Weight: 43.5 kg (96 lb)    Height: 5' 4\" (162.6 cm) (01/27/19 0916) If above not within 1 hour of discharge: 
 
1/30 at 15:29 BP:  100/60  P: 80  R: 16 T: 97.4 O2 Sat: 95 %  O2:RA Active Orders Diet DIET DENTAL SOFT (SOFT SOLID) Orientation: only aware of  person Active Behaviors: None Active Lines/Drains:  (Peg Tube / Perez / CL or S/L?): no 
 
Urinary Status: Voiding     Last BM: Last Bowel Movement Date: 01/29/19 Mobility: Slightly limited Weight Bearing Status: WBAT (Weight Bearing as Tolerated) Lab Results Component Value Date/Time Glucose 83 01/30/2019 05:00 AM  
 Hemoglobin A1c 6.0 02/16/2013 04:25 AM  
 INR 0.9 07/10/2015 04:18 PM  
 INR 1.0 02/15/2013 01:15 PM  
 HGB 10.8 (L) 01/27/2019 09:30 AM  
 HGB 10.7 (L) 12/15/2018 08:46 PM  
  
  RECOMMENDATION See After Visit Summary (AVS) for: · Discharge instructions · [facility] · Special equipment needed (entered pre-discharge by Care Management) · Medication Reconciliation · Follow up Appointment(s) Report given/sent by:  Avelina Teran RN Verbal report given to: ANGELITO Brock LPN  FAXED to:  [phone] Estimated discharge time:  1/30/2019 at 1600pm

## 2019-01-30 NOTE — PROGRESS NOTES
Problem: Falls - Risk of 
Goal: *Absence of Falls Document Jaswinder Gomez Fall Risk and appropriate interventions in the flowsheet. Outcome: Progressing Towards Goal 
Fall Risk Interventions: 
Mobility Interventions: Communicate number of staff needed for ambulation/transfer, PT Consult for mobility concerns, Strengthening exercises (ROM-active/passive) Mentation Interventions: Bed/chair exit alarm, Familiar objects from home, HELP ([address]) if available Medication Interventions: Bed/chair exit alarm, Teach patient to arise slowly Elimination Interventions: Call light in reach, Patient to call for help with toileting needs History of Falls Interventions: Bed/chair exit alarm, Door open when patient unattended Problem: Pressure Injury - Risk of 
Goal: *Prevention of pressure injury Document Josiah Scale and appropriate interventions in the flowsheet. Outcome: Progressing Towards Goal 
Pressure Injury Interventions: 
Sensory Interventions: Check visual cues for pain, Minimize linen layers, Keep linens dry and wrinkle-free Moisture Interventions: Contain wound drainage, Limit adult briefs, Minimize layers Activity Interventions: Chair cushion, Pressure redistribution bed/mattress(bed type) Mobility Interventions: Chair cushion, HOB 30 degrees or less, Suspension boots Nutrition Interventions: Document food/fluid/supplement intake, Discuss nutritional consult with provider, Offer support with meals,snacks and hydration Friction and Shear Interventions: HOB 30 degrees or less, Minimize layers, Sit at 90-degree angle

## 2019-01-30 NOTE — PROGRESS NOTES
Pt accepted to [facility], bed available today. Discussed with Dr. Demetra Soliz on whether to call caregiver & let her know, he said sure call her. Called Ms. Ange Cuba & made her aware of above, became upset stating she found her a place @ [address] . Made her aware that I spoke with pt's son, Danelle Villalba in [facility] yesterday & he was agreeable to send her to SNF where Dr. Demetra Soliz goes. Allowed her to vent. She states she will be calling pt's son. Dr. Demetra Soliz called pt's son, Leida Du, in [facility] & made him aware of discharge & that she will be going to [facility], where he can follow her & he is the medical director, he is agreeable to transfer. Ms. Ange Cuba here & met with me, states she looked up [email or web address] saw that it is a nursing home & she is grateful, no issues. Made her aware that Dr. Demetra Soliz did speak with pt's son. Pt's nurse made aware of above. Available as needed. May MonroyRN,ext 2065. Care Management Interventions PCP Verified by CM: Yes(Dr Chen ( last appt in the summer)) Mode of Transport at Discharge: S Transition of Care Consult (CM Consult): SNF Partner SNF: No 
Reason Why Partner SNF Not Chosen: Friend/family recommendation Discharge Durable Medical Equipment: No 
Physical Therapy Consult: Yes Occupational Therapy Consult: Yes Speech Therapy Consult: No 
Current Support Network: Own Home, Lives Alone(Had care-giver since 2017 (started to stay with her this past Thursday)) Confirm Follow Up Transport: (Caregiver- Korin Mcbride) Plan discussed with Pt/Family/Caregiver: Yes Freedom of Choice Offered: Yes Discharge Location Discharge Placement: Skilled nursing facility([address])

## 2019-01-30 NOTE — DISCHARGE INSTRUCTIONS
DISCHARGE SUMMARY from Nurse    PATIENT INSTRUCTIONS:      What to do at [facility]:  * Recommended activity: High risk for fall    * If you experience any of the following symptoms as listed in your teaching for Altered Mental Status and Cellulitis, please have your nurse, call your Primary Care Physician and/or call 911. *  Please give a list of your current medications to your Primary Care Provider. *  Please update this list whenever your medications are discontinued, doses are      changed, or new medications (including over-the-counter products) are added. *  Please carry medication information at all times in case of emergency situations. These are general instructions for a healthy lifestyle:    No smoking/ No tobacco products/ Avoid exposure to second hand smoke  Surgeon General's Warning:  Quitting smoking now greatly reduces serious risk to your health. Obesity, smoking, and sedentary lifestyle greatly increases your risk for illness    A healthy diet, regular physical exercise & weight monitoring are important for maintaining a healthy lifestyle    You may be retaining fluid if you have a history of heart failure or if you experience any of the following symptoms:  Weight gain of 3 pounds or more overnight or 5 pounds in a week, increased swelling in our hands or feet or shortness of breath while lying flat in bed. Please call your doctor as soon as you notice any of these symptoms; do not wait until your next office visit. Recognize signs and symptoms of STROKE:    F-face looks uneven    A-arms unable to move or move unevenly    S-speech slurred or non-existent    T-time-call 911 as soon as signs and symptoms begin-DO NOT go       Back to bed or wait to see if you get better-TIME IS BRAIN. Warning Signs of HEART ATTACK     Call 911 if you have these symptoms:   Chest discomfort.  Most heart attacks involve discomfort in the center of the chest that lasts more than a few minutes, or that goes away and comes back. It can feel like uncomfortable pressure, squeezing, fullness, or pain.  Discomfort in other areas of the upper body. Symptoms can include pain or discomfort in one or both arms, the back, neck, jaw, or stomach.  Shortness of breath with or without chest discomfort.  Other signs may include breaking out in a cold sweat, nausea, or lightheadedness. Don't wait more than five minutes to call 911 - MINUTES MATTER! Fast action can save your life. Calling 911 is almost always the fastest way to get lifesaving treatment. Emergency Medical Services staff can begin treatment when they arrive -- up to an hour sooner than if someone gets to the hospital by car. Patient discharged without removing armband and transfered to another healthcare acute, sub acute , or extended care facility. Informed of privacy risks if armband lost or stolen     The discharge information has been sent with the patient. The patient confused information need reinforcing.

## 2019-01-30 NOTE — PROGRESS NOTES
Nutrition follow-up/Plan of care RECOMMENDATIONS:  
1. [facility] Soft Diet (chopped meats w/ gravy) 2. Ensure Enlive TID (strawberry) 3. Monitor labs, weight and PO intake 4. RD to follow GOALS:  
1. PO intake meets >75% of protein/calorie needs by 2/2 
2. Weight Maintenance/gradual gain (1-2 lb/week) by 2/6    
ASSESSMENT: 
Wt status is classified as underweight per BMI of 16.5. Noted per documented weight records Pt was 126 lb in (08/2018) equating to a total loss of 30 lb or 23.8% x 6 months with 14 lb or 12.7% of that over the past 6 weeks. Poor PO intake x 3 days per vitals (10-20%). Labs noted. Pt w/ hyponatremia and elevated BUN. Nutrition recommendations listed. RD to follow. Nutrition Diagnoses:  
Unintentional weight loss related to inadequate energy intake PTA as evidenced by a total loss of 30 lb or 23.8% x 6 months with 14 lb or 12.7% of that over the past 6 weeks per documented weight records. Inadequate PO intake related to AMS/decreased appetite as evidenced by <25% of trays consumed x 3 days. Meets Criteria for Acute Malnutrition  
[x] Severe Malnutrition, as evidenced by: 
 [x] Moderate muscle wasting, loss of subcutaneous fat 
 [] Nutritional intake of <50% of recommended intake for >5 days [x] Weight loss of >1-2% in 1 week, >5% in 1 month, >7.5% in 3 months, or >10% in 6 months 
 [] Moderate-severe edema Nutrition Risk:  [x] High  [] Moderate []  Low SUBJECTIVE/OBJECTIVE:  
(1/30): Pt seen in room sleeping at lunch. Diet advanced to Dental Soft and attempted to wake Pt to encourage intake of lunch tray, but she refused and became agitated with me. Poor PO intake during admission and most likely PTA as well given weight loss. Had d/w RN and CNA to attempt to encourage intake if awake. Will monitor.    
 
(1/28): Pt admitted for AMS and cellulitis of R hand. Familiar with Pt from previous admission.   Noted per documented weight records Pt was 126 lb in (08/2018) equating to a total loss of 30 lb or 23.8% x 6 months with 14 lb or 12.7% of that over the past 6 weeks. Pt seen in room resting later in the day. Stated she just hasn't been hungry. Weight taken with bed scale during visit; 99 lb. Pt with poor PO intake of full liquid diet. Will order Ensure Enlive to try to get additional calories/protein. Encourage intake and will follow. Information Obtained from:  
 [x] Chart Review [x] Patient 
 [] Family/Caregiver [x] Nurse/Physician/CNA [] Interdisciplinary Meeting/Rounds Diet: Dental Soft Diet (NDD3) Medications: [x] Reviewed Allergies: [x] Reviewed Patient Active Problem List  
Diagnosis Code  Altered mental status R41.82  
 Hand arthritis M19.049  
 MCI (mild cognitive impairment) G31.84  
 Hypokalemia E87.6  
 HTN (hypertension) I10  
 CAD (coronary artery disease) I25.10  PMR (polymyalgia rheumatica) ([facility]) M35.3 Past Medical History:  
Diagnosis Date  Arthritis  CAD (coronary artery disease) 2001  
 3 coronary stents  Cardiac complication  Hypertension  Migraine 3/1/2015  Polymyalgia rheumatica ([facility] [address] 2/28/2015 Labs:   
Lab Results Component Value Date/Time Sodium 135 (L) 01/30/2019 05:00 AM  
 Potassium 3.8 01/30/2019 05:00 AM  
 Chloride 96 (L) 01/30/2019 05:00 AM  
 CO2 29 01/30/2019 05:00 AM  
 Anion gap 10 01/30/2019 05:00 AM  
 Glucose 83 01/30/2019 05:00 AM  
 BUN 22 (H) 01/30/2019 05:00 AM  
 Creatinine 0.72 01/30/2019 05:00 AM  
 Calcium 8.8 01/30/2019 05:00 AM  
 Magnesium 0.8 (LL) 09/20/2018 03:00 PM  
 Albumin 2.6 (L) 01/27/2019 09:30 AM  
 
Anthropometrics: BMI (calculated): 16.5 Last 3 Recorded Weights in this Encounter 01/27/19 3077 Weight: 43.5 kg (96 lb) Ht Readings from Last 1 Encounters:  
01/27/19 5' 4\" (1.626 m) Weight Metrics 1/27/2019 12/15/2018 9/10/2018 8/26/2018 8/4/2018 8/4/2018 3/5/2015 Weight 96 lb 110 lb 119 lb 116 lb 123 lb 9.6 oz 126 lb 153 lb BMI 16.48 kg/m2 18.88 kg/m2 19.8 kg/m2 19.91 kg/m2 20.57 kg/m2 20.97 kg/m2 25.46 kg/m2 Patient Vitals for the past 100 hrs: 
 % Diet Eaten 01/30/19 0828 20 % 01/28/19 1232 10 % 01/27/19 1830 20 % IBW: 120 lb %IBW: 80% UBW: 126 lb x 6 months ago [x] Weight Loss [] Weight Gain [] Weight Stable Estimated Nutrition Needs: [x] MSJ  [] Other: 
Calories: 6857-7791 kcal Based on:   [x] Actual BW   
Protein:   60-65 g Based on:   [x] Actual BW Fluid:       2860-6257 ml Based on:   [x] Actual BW  
 
 [x] No Cultural, Moravian or ethnic dietary need identified. [] Cultural, Moravian and ethnic food preferences identified and addressed Wt Status:  [] Normal (18.6 - 24.9) [x] Underweight (<18.5) [] Overweight (25 - 29.9) [] Mild Obesity (30 - 34.9)  [] Moderate Obesity (35 - 39.9) [] Morbid Obesity (40+) Nutrition Problems Identified:  
[x] Suboptimal PO intake  
[] Food Allergies [x] Difficulty chewing/swallowing/poor dentition 
[] Constipation/Diarrhea  
[] Nausea/Vomiting  
[] None 
[x] Other: Weight loss Plan:  
[x] Therapeutic Diet 
[]  Obtained/adjusted food preferences/tolerances and/or snacks options [x]  Continue supplements added  
[] Occupational therapy following for feeding techniques []  HS snack added  
[]  Modify diet texture  
[x]  Modify diet for food allergies []  Educate patient  
[]  Assist with menu selection  
[x]  Monitor PO intake on meal rounds  
[x]  Continue inpatient monitoring and intervention  
[]  Participated in discharge planning/Interdisciplinary rounds/Team meetings  
[]  Other:  
 
Education Needs: 
 [x] Not appropriate for teaching at this time  
 [] Identified and addressed Nutrition Monitoring and Evaluation: 
[x] Continue ongoing monitoring and intervention 
[] Stacey Conde

## 2019-01-30 NOTE — PROGRESS NOTES
Problem: Falls - Risk of 
Goal: *Absence of Falls Document Dinosaur Officer Fall Risk and appropriate interventions in the flowsheet. Outcome: Progressing Towards Goal 
Fall Risk Interventions: 
Mobility Interventions: Communicate number of staff needed for ambulation/transfer, Bed/chair exit alarm, PT Consult for mobility concerns, Patient to call before getting OOB Mentation Interventions: Adequate sleep, hydration, pain control, Bed/chair exit alarm Medication Interventions: Patient to call before getting OOB, Bed/chair exit alarm Elimination Interventions: Call light in reach, Bed/chair exit alarm, Patient to call for help with toileting needs History of Falls Interventions: Bed/chair exit alarm, Investigate reason for fall, Room close to nurse's station Problem: Pressure Injury - Risk of 
Goal: *Prevention of pressure injury Document Josiah Scale and appropriate interventions in the flowsheet. Outcome: Progressing Towards Goal 
Pressure Injury Interventions: 
Sensory Interventions: Check visual cues for pain, Discuss PT/OT consult with provider, Keep linens dry and wrinkle-free, Assess changes in LOC Moisture Interventions: Absorbent underpads, Maintain skin hydration (lotion/cream), Check for incontinence Q2 hours and as needed Activity Interventions: Pressure redistribution bed/mattress(bed type), PT/OT evaluation, Chair cushion Mobility Interventions: HOB 30 degrees or less, Pressure redistribution bed/mattress (bed type), PT/OT evaluation, Turn and reposition approx. every two hours(pillow and wedges) Nutrition Interventions: Document food/fluid/supplement intake Friction and Shear Interventions: HOB 30 degrees or less, Apply protective barrier, creams and emollients

## 2019-01-30 NOTE — PROGRESS NOTES
4862: PT treatment session attempted, patient sleeping soundly and does not arouse to voice. ; 
[phone]: Second attempt, patient sleeping but arouses to voices. Refusing OOB d/t foot pain; does not specify which foot. Unable to encourage OOB with patient. Will f/u patient. 5445: Third attempt, patient sleeping an does not arouse to voice. Will f/u with patient. Yuli Knight PT, DPT Office extension: [phone] Pager #: [phone]

## 2019-01-30 NOTE — ANCILLARY DISCHARGE INSTRUCTIONS
Patient and/or next of kin has been given the [facility] Important Message From Medicare About Your Rights\" letter and all questions were answered.

## 2019-01-30 NOTE — PROGRESS NOTES
Beside and verbal  Shift report given to sister shaquille rn(oncoming nurse) by Melo Manuel RN, (off going nurse). Report include the following information, SBAR, KARDEX, MAR, Recent results, and intake and output. Skin assessed together.

## 2019-02-02 LAB
BACTERIA SPEC CULT: NORMAL
BACTERIA SPEC CULT: NORMAL
SERVICE CMNT-IMP: NORMAL
SERVICE CMNT-IMP: NORMAL

## 2020-10-14 NOTE — PROGRESS NOTES
SW met with pt to complete the MDS interview . Pt denied any symptoms of depression or anxiety. Additional Area 1 Units: 16